# Patient Record
Sex: FEMALE | Race: WHITE | NOT HISPANIC OR LATINO | Employment: OTHER | URBAN - NONMETROPOLITAN AREA
[De-identification: names, ages, dates, MRNs, and addresses within clinical notes are randomized per-mention and may not be internally consistent; named-entity substitution may affect disease eponyms.]

---

## 2020-08-10 ENCOUNTER — OFFICE VISIT (OUTPATIENT)
Dept: INTERNAL MEDICINE | Facility: CLINIC | Age: 65
End: 2020-08-10

## 2020-08-10 ENCOUNTER — RESULTS ENCOUNTER (OUTPATIENT)
Dept: INTERNAL MEDICINE | Facility: CLINIC | Age: 65
End: 2020-08-10

## 2020-08-10 VITALS
DIASTOLIC BLOOD PRESSURE: 70 MMHG | HEIGHT: 68 IN | WEIGHT: 120 LBS | OXYGEN SATURATION: 98 % | SYSTOLIC BLOOD PRESSURE: 120 MMHG | HEART RATE: 77 BPM | TEMPERATURE: 97.3 F | BODY MASS INDEX: 18.19 KG/M2

## 2020-08-10 DIAGNOSIS — M81.0 OSTEOPOROSIS, UNSPECIFIED OSTEOPOROSIS TYPE, UNSPECIFIED PATHOLOGICAL FRACTURE PRESENCE: ICD-10-CM

## 2020-08-10 DIAGNOSIS — I10 ESSENTIAL HYPERTENSION: ICD-10-CM

## 2020-08-10 DIAGNOSIS — F41.9 ANXIETY: ICD-10-CM

## 2020-08-10 DIAGNOSIS — Z79.899 LONG TERM CURRENT USE OF THERAPEUTIC DRUG: ICD-10-CM

## 2020-08-10 DIAGNOSIS — Z76.89 ENCOUNTER TO ESTABLISH CARE: Primary | ICD-10-CM

## 2020-08-10 DIAGNOSIS — M45.9 ANKYLOSING SPONDYLITIS, UNSPECIFIED SITE OF SPINE (HCC): ICD-10-CM

## 2020-08-10 DIAGNOSIS — Z11.59 NEED FOR HEPATITIS C SCREENING TEST: ICD-10-CM

## 2020-08-10 DIAGNOSIS — I45.10 RIGHT BUNDLE BRANCH BLOCK: ICD-10-CM

## 2020-08-10 DIAGNOSIS — I44.4 LEFT ANTERIOR FASCICULAR BLOCK: ICD-10-CM

## 2020-08-10 PROCEDURE — 99203 OFFICE O/P NEW LOW 30 MIN: CPT | Performed by: NURSE PRACTITIONER

## 2020-08-10 PROCEDURE — 93000 ELECTROCARDIOGRAM COMPLETE: CPT | Performed by: NURSE PRACTITIONER

## 2020-08-10 RX ORDER — HYDROCHLOROTHIAZIDE 25 MG/1
25 TABLET ORAL DAILY
COMMUNITY
End: 2021-07-02 | Stop reason: SDUPTHER

## 2020-08-10 RX ORDER — ALENDRONATE SODIUM 70 MG/1
70 TABLET ORAL
COMMUNITY
End: 2020-11-16 | Stop reason: SDUPTHER

## 2020-08-10 RX ORDER — LISINOPRIL 20 MG/1
20 TABLET ORAL DAILY
COMMUNITY
End: 2021-07-02 | Stop reason: SDUPTHER

## 2020-08-10 RX ORDER — ETODOLAC 400 MG/1
400 TABLET, FILM COATED ORAL 2 TIMES DAILY
COMMUNITY
End: 2020-10-30 | Stop reason: SDUPTHER

## 2020-08-10 RX ORDER — PREDNISOLONE ACETATE 10 MG/ML
1 SUSPENSION/ DROPS OPHTHALMIC AS NEEDED
COMMUNITY
End: 2023-02-23

## 2020-08-10 RX ORDER — ALPRAZOLAM 0.5 MG/1
0.5 TABLET ORAL DAILY
COMMUNITY
End: 2020-09-30 | Stop reason: SDUPTHER

## 2020-08-10 NOTE — PROGRESS NOTES
Subjective   Edel Ashton is a 65 y.o. female.   Chief Complaint   Patient presents with   • Establish Care     reports last EKG showed 2 blockages and forgot to bring it       Michelle presents today to establish care with primary care provider.  She recently moved here from [facility] about 3 weeks ago.  She reports a history of anxiety and depression, resolved skin cancer, hypertension, and ankylosing spondylitis.  Anxiety is well managed as she continues on Xanax 0.5 mg once daily.  She states she has attempted being without this medication and is unable to tolerate side effects.  She reports she feels she is on fire when not taking it.  She does report that she used to take Xanax 1 mg daily but has decreased to 0.5 mg and this is working well for her.    She does monitor her blood pressures at home.  She reports she usually does this about once a week and gets readings around 140 systolic but cannot recall what her diastolic typically runs.  She is 120/70 in the office today.  She denies any symptoms of chest pain shortness of breath or palpitations.  She does report back in May or June of this year she began to feel some left arm tingling that let her go go to the emergency room for evaluation.  An EKG at that time showed 2 different blockages per her report and she was referred back to her PCP.  She states otherwise there was no positive findings in the emergency room.  She denies having any further episodes similar to the one that took her to the emergency room.    She reports her last Pap was in December 2019.  She states close to 30 years ago she had an abnormal Pap that resulted in colposcopy.  She states since then she has had normal Pap smears.  She tends to get these every 3 years.  She reports her last mammogram was around December 2019 and was normal.  She reports her last colonoscopy was in November 2019 and she is to repeat in 3 years due to findings.  She reports they found 7 polyps and  were unable to remove a few of them.  She states she also believes she had her bone density scan in fall 2019.  She reports a history of osteoporosis that she continues Fosamax for.       The following portions of the patient's history were reviewed and updated as appropriate: allergies, current medications, past family history, past medical history, past social history, past surgical history and problem list.    Review of Systems   Constitutional: Negative for activity change, appetite change, fatigue, fever, unexpected weight gain and unexpected weight loss.   HENT: Negative for swollen glands, trouble swallowing and voice change.    Eyes: Negative for blurred vision and visual disturbance.   Respiratory: Negative for cough and shortness of breath.    Cardiovascular: Negative for chest pain, palpitations and leg swelling.   Gastrointestinal: Negative for abdominal pain, constipation, diarrhea, nausea, vomiting and indigestion.   Endocrine: Negative for cold intolerance, heat intolerance, polydipsia and polyphagia.   Genitourinary: Negative for dysuria and frequency.   Musculoskeletal: Positive for arthralgias. Negative for back pain, joint swelling and neck pain.   Skin: Negative for color change, rash and skin lesions.   Neurological: Negative for dizziness, weakness, headache, memory problem and confusion.   Hematological: Does not bruise/bleed easily.   Psychiatric/Behavioral: Negative for agitation, hallucinations and suicidal ideas. The patient is nervous/anxious.        Objective   Past Medical History:   Diagnosis Date   • Anxiety    • Cancer (CMS/HCC)     skin   • Depression    • Hypertension       Past Surgical History:   Procedure Laterality Date   • CHOLECYSTECTOMY  1978   • SKIN CANCER EXCISION  2019        Current Outpatient Medications:   •  alendronate (FOSAMAX) 70 MG tablet, Take 70 mg by mouth Every 7 (Seven) Days., Disp: , Rfl:   •  ALPRAZolam (XANAX) 0.5 MG tablet, Take 0.5 mg by mouth Daily.,  Disp: , Rfl:   •  etodolac (LODINE) 400 MG tablet, Take 400 mg by mouth 2 (Two) Times a Day., Disp: , Rfl:   •  hydroCHLOROthiazide (HYDRODIURIL) 25 MG tablet, Take 25 mg by mouth Daily., Disp: , Rfl:   •  lisinopril (PRINIVIL,ZESTRIL) 20 MG tablet, Take 20 mg by mouth Daily., Disp: , Rfl:   •  prednisoLONE acetate (PRED FORTE) 1 % ophthalmic suspension, 1 drop As Needed., Disp: , Rfl:      Vitals:    08/10/20 1313   BP: 120/70   Pulse: 77   Temp: 97.3 °F (36.3 °C)   SpO2: 98%         08/10/20  1313   Weight: 54.4 kg (120 lb)     Patient's Body mass index is 18.25 kg/m². BMI is within normal parameters. No follow-up required..      Physical Exam   Constitutional: She is oriented to person, place, and time. She appears well-developed and well-nourished.   HENT:   Head: Normocephalic and atraumatic.   Right Ear: Tympanic membrane and external ear normal.   Left Ear: Tympanic membrane and external ear normal.   Nose: Nose normal.   Mouth/Throat: Oropharynx is clear and moist. No oral lesions.   Eyes: Pupils are equal, round, and reactive to light. Conjunctivae and EOM are normal.   Neck: Normal range of motion. Neck supple. No JVD present. Carotid bruit is not present. No thyromegaly present.   Cardiovascular: Normal rate, regular rhythm and intact distal pulses.   Pulses:       Radial pulses are 2+ on the right side, and 2+ on the left side.   No bilateral lower extremity edema   Pulmonary/Chest: Effort normal and breath sounds normal.   Abdominal: Soft. Bowel sounds are normal. There is no tenderness.   Lymphadenopathy:     She has no cervical adenopathy.   Neurological: She is alert and oriented to person, place, and time. Coordination and gait normal.   Skin: Skin is warm and dry.   Psychiatric: She has a normal mood and affect. Her speech is normal and behavior is normal. Thought content normal.   Nursing note and vitals reviewed.      ECG 12 Lead  Date/Time: 8/10/2020 4:48 PM  Performed by: Silvia Servin  LUAN  Authorized by: Silvia Servin APRN   Rhythm: sinus rhythm  Conduction: right bundle branch block and left anterior fascicular block  Other findings comments: Possible Left Atrial Enlargement; Possible Left Ventricular Hypertrophy    Clinical impression: abnormal EKG  Comments: Reviewed by Dr. Tarun Wahl            Assessment/Plan   Diagnoses and all orders for this visit:    1. Encounter to establish care (Primary)    2. Essential hypertension  -     Comprehensive Metabolic Panel; Future  -     CBC & Differential; Future  -     Lipid Panel; Future  -     Uric Acid; Future  -     Urinalysis With Microscopic - Urine, Clean Catch; Future    3. Osteoporosis, unspecified osteoporosis type, unspecified pathological fracture presence    4. Anxiety  -     TSH; Future  -     046912 [id number] Drug-[facility] -; Future    5. Ankylosing spondylitis, unspecified site of spine (CMS/HCC)    6. Need for hepatitis C screening test  -     Hepatitis C Antibody; Future    7. Right bundle branch block  -     Ambulatory Referral to Cardiology  -     ECG 12 Lead    8. Left anterior fascicular block  -     Ambulatory Referral to Cardiology    9. Long term current use of therapeutic drug  -     362178 [id number] Drug-Unbund -; Future        An EKG was repeated in office today showing a right bundle branch block as well as a left anterior fascicular block.  I have discussed this with Dr. Wahl who recommends evaluation by cardiology.  Patient agrees to this referral and this is been placed in the chart.  Blood pressure appears to be well controlled.  She did report she runs a little higher at home than what we got in the office today.  I have asked that at her next follow-up she bring her cuff with her from home to verify its accuracy.  She is not fasting today therefore labs have been entered and I have asked her to return sometime this week fasting to have them completed.  We will complete her annual in 3 months.  I have had her sign a controlled  substance contract, have placed an order for urine drug screen, and reviewed her [facility] report.  She is not due for refills at this time.

## 2020-08-13 LAB
ALBUMIN SERPL-MCNC: 4.4 G/DL (ref 3.5–5.2)
ALBUMIN/GLOB SERPL: 2.8 G/DL
ALP SERPL-CCNC: 56 U/L (ref 39–117)
ALT SERPL-CCNC: 16 U/L (ref 1–33)
APPEARANCE UR: ABNORMAL
AST SERPL-CCNC: 18 U/L (ref 1–32)
BACTERIA #/AREA URNS HPF: ABNORMAL /HPF
BASOPHILS # BLD AUTO: 0.05 10*3/MM3 (ref 0–0.2)
BASOPHILS NFR BLD AUTO: 0.9 % (ref 0–1.5)
BILIRUB SERPL-MCNC: 0.6 MG/DL (ref 0–1.2)
BILIRUB UR QL STRIP: NEGATIVE
BUN SERPL-MCNC: 20 MG/DL (ref 8–23)
BUN/CREAT SERPL: 21.5 (ref 7–25)
CALCIUM SERPL-MCNC: 8.9 MG/DL (ref 8.6–10.5)
CASTS URNS MICRO: ABNORMAL
CHLORIDE SERPL-SCNC: 105 MMOL/L (ref 98–107)
CHOLEST SERPL-MCNC: 200 MG/DL (ref 0–200)
CO2 SERPL-SCNC: 27.2 MMOL/L (ref 22–29)
COLOR UR: ABNORMAL
CREAT SERPL-MCNC: 0.93 MG/DL (ref 0.57–1)
EOSINOPHIL # BLD AUTO: 0.28 10*3/MM3 (ref 0–0.4)
EOSINOPHIL NFR BLD AUTO: 4.9 % (ref 0.3–6.2)
EPI CELLS #/AREA URNS HPF: ABNORMAL /HPF
ERYTHROCYTE [DISTWIDTH] IN BLOOD BY AUTOMATED COUNT: 11.5 % (ref 12.3–15.4)
GLOBULIN SER CALC-MCNC: 1.6 GM/DL
GLUCOSE SERPL-MCNC: 97 MG/DL (ref 65–99)
GLUCOSE UR QL: NEGATIVE
HCT VFR BLD AUTO: 41.9 % (ref 34–46.6)
HCV AB S/CO SERPL IA: 0.1 S/CO RATIO (ref 0–0.9)
HDLC SERPL-MCNC: 115 MG/DL (ref 40–60)
HGB BLD-MCNC: 14.5 G/DL (ref 12–15.9)
HGB UR QL STRIP: NEGATIVE
IMM GRANULOCYTES # BLD AUTO: 0.01 10*3/MM3 (ref 0–0.05)
IMM GRANULOCYTES NFR BLD AUTO: 0.2 % (ref 0–0.5)
KETONES UR QL STRIP: NEGATIVE
LDLC SERPL CALC-MCNC: 76 MG/DL (ref 0–100)
LEUKOCYTE ESTERASE UR QL STRIP: ABNORMAL
LYMPHOCYTES # BLD AUTO: 1.51 10*3/MM3 (ref 0.7–3.1)
LYMPHOCYTES NFR BLD AUTO: 26.5 % (ref 19.6–45.3)
MCH RBC QN AUTO: 32.4 PG (ref 26.6–33)
MCHC RBC AUTO-ENTMCNC: 34.6 G/DL (ref 31.5–35.7)
MCV RBC AUTO: 93.7 FL (ref 79–97)
MONOCYTES # BLD AUTO: 0.45 10*3/MM3 (ref 0.1–0.9)
MONOCYTES NFR BLD AUTO: 7.9 % (ref 5–12)
NEUTROPHILS # BLD AUTO: 3.4 10*3/MM3 (ref 1.7–7)
NEUTROPHILS NFR BLD AUTO: 59.6 % (ref 42.7–76)
NITRITE UR QL STRIP: NEGATIVE
NRBC BLD AUTO-RTO: 0 /100 WBC (ref 0–0.2)
PH UR STRIP: 5.5 [PH] (ref 5–8)
PLATELET # BLD AUTO: 268 10*3/MM3 (ref 140–450)
POTASSIUM SERPL-SCNC: 4.5 MMOL/L (ref 3.5–5.2)
PROT SERPL-MCNC: 6 G/DL (ref 6–8.5)
PROT UR QL STRIP: ABNORMAL
RBC # BLD AUTO: 4.47 10*6/MM3 (ref 3.77–5.28)
RBC #/AREA URNS HPF: ABNORMAL /HPF
SODIUM SERPL-SCNC: 143 MMOL/L (ref 136–145)
SP GR UR: 1.02 (ref 1–1.03)
TRIGL SERPL-MCNC: 43 MG/DL (ref 0–150)
TSH SERPL DL<=0.005 MIU/L-ACNC: 0.86 UIU/ML (ref 0.27–4.2)
URATE SERPL-MCNC: 2.2 MG/DL (ref 2.4–5.7)
UROBILINOGEN UR STRIP-MCNC: ABNORMAL MG/DL
VLDLC SERPL CALC-MCNC: 8.6 MG/DL
WBC # BLD AUTO: 5.7 10*3/MM3 (ref 3.4–10.8)
WBC #/AREA URNS HPF: ABNORMAL /HPF

## 2020-08-15 ENCOUNTER — RESULTS ENCOUNTER (OUTPATIENT)
Dept: INTERNAL MEDICINE | Facility: CLINIC | Age: 65
End: 2020-08-15

## 2020-08-15 DIAGNOSIS — Z11.59 NEED FOR HEPATITIS C SCREENING TEST: ICD-10-CM

## 2020-08-15 DIAGNOSIS — F41.9 ANXIETY: ICD-10-CM

## 2020-08-15 DIAGNOSIS — I10 ESSENTIAL HYPERTENSION: ICD-10-CM

## 2020-09-02 ENCOUNTER — OFFICE VISIT (OUTPATIENT)
Dept: CARDIOLOGY | Facility: CLINIC | Age: 65
End: 2020-09-02

## 2020-09-02 VITALS
DIASTOLIC BLOOD PRESSURE: 62 MMHG | HEART RATE: 74 BPM | BODY MASS INDEX: 18.04 KG/M2 | SYSTOLIC BLOOD PRESSURE: 120 MMHG | HEIGHT: 68 IN | OXYGEN SATURATION: 98 % | WEIGHT: 119 LBS

## 2020-09-02 DIAGNOSIS — I10 ESSENTIAL HYPERTENSION: Primary | ICD-10-CM

## 2020-09-02 DIAGNOSIS — F17.200 SMOKER: ICD-10-CM

## 2020-09-02 DIAGNOSIS — Z82.49 FAMILY HISTORY OF EARLY CAD: ICD-10-CM

## 2020-09-02 DIAGNOSIS — G89.29 CHRONIC MIDLINE LOW BACK PAIN WITHOUT SCIATICA: ICD-10-CM

## 2020-09-02 DIAGNOSIS — R94.31 ABNORMAL ECG: ICD-10-CM

## 2020-09-02 DIAGNOSIS — I44.4 LAFB (LEFT ANTERIOR FASCICULAR BLOCK): ICD-10-CM

## 2020-09-02 DIAGNOSIS — F41.9 ANXIETY AND DEPRESSION: ICD-10-CM

## 2020-09-02 DIAGNOSIS — R06.09 DOE (DYSPNEA ON EXERTION): ICD-10-CM

## 2020-09-02 DIAGNOSIS — I45.10 RBBB: ICD-10-CM

## 2020-09-02 DIAGNOSIS — M54.50 CHRONIC MIDLINE LOW BACK PAIN WITHOUT SCIATICA: ICD-10-CM

## 2020-09-02 DIAGNOSIS — F32.A ANXIETY AND DEPRESSION: ICD-10-CM

## 2020-09-02 PROBLEM — I25.10 CORONARY ARTERY DISEASE INVOLVING NATIVE CORONARY ARTERY: Status: ACTIVE | Noted: 2020-09-02

## 2020-09-02 PROBLEM — I25.10 CORONARY ARTERY DISEASE INVOLVING NATIVE CORONARY ARTERY: Status: RESOLVED | Noted: 2020-09-02 | Resolved: 2020-09-02

## 2020-09-02 PROCEDURE — 99204 OFFICE O/P NEW MOD 45 MIN: CPT | Performed by: INTERNAL MEDICINE

## 2020-09-02 NOTE — PROGRESS NOTES
Edel Ashton  [id number]  1955  65 y.o.  female    Referring Provider: Silvia Servin APRN    Reason for  Visit:  Initial visit for  shortness of breath   Referred for abnormal ECG right  bundle branch block   left anterior fascicular block      Subjective    Mild chronic exertional shortness of breath on exertion relieved with rest  No significant cough or wheezing    No palpitations  No associated chest pain  No significant pedal edema    No fever or chills  No significant expectoration    No hemoptysis  No presyncope or syncope    Tolerating current medications well with no untoward side effects   Compliant with prescribed medication regimen. Tries to adhere to cardiac diet.   Smoker   Strong family history of early coronary artery disease    Joint pain in small, medium and large joints   Chronic low back pain          History of present illness:  Edel Ashton is a 65 y.o. yo female with history of essential Hypertension    who presents today for   Chief Complaint   Patient presents with   • RBBB     NEW PT    .    History  Past Medical History:   Diagnosis Date   • Anxiety    • Cancer (CMS/HCC)     skin   • Depression    • Hypertension    ,   Past Surgical History:   Procedure Laterality Date   • CHOLECYSTECTOMY  1978   • SKIN CANCER EXCISION  2019   ,   Family History   Problem Relation Age of Onset   • Arthritis Mother    • Hypertension Mother    • Depression Mother    • Cancer Maternal Grandmother    • Arthritis Paternal Grandmother    • Cancer Paternal Grandmother    • Cancer Other    ,   Social History     Tobacco Use   • Smoking status: Current Some Day Smoker     Years: 40.00     Types: Cigarettes   • Smokeless tobacco: Never Used   • Tobacco comment: occasional   Substance Use Topics   • Alcohol use: Yes     Alcohol/week: 1.0 - 2.0 standard drinks     Types: 1 - 2 Cans of beer per week     Comment: per patient a night   • Drug use: Never   ,     Medications  Current Outpatient Medications  "  Medication Sig Dispense Refill   • alendronate (FOSAMAX) 70 MG tablet Take 70 mg by mouth Every 7 (Seven) Days.     • ALPRAZolam (XANAX) 0.5 MG tablet Take 0.5 mg by mouth Daily.     • etodolac (LODINE) 400 MG tablet Take 400 mg by mouth 2 (Two) Times a Day.     • hydroCHLOROthiazide (HYDRODIURIL) 25 MG tablet Take 25 mg by mouth Daily.     • lisinopril (PRINIVIL,ZESTRIL) 20 MG tablet Take 20 mg by mouth Daily.     • prednisoLONE acetate (PRED FORTE) 1 % ophthalmic suspension 1 drop As Needed.       No current facility-administered medications for this visit.        Allergies:  Patient has no known allergies.    Review of Systems  Review of Systems   Constitution: Negative.   HENT: Negative.    Eyes: Negative.    Cardiovascular: Positive for dyspnea on exertion. Negative for chest pain, claudication, cyanosis, irregular heartbeat, leg swelling, near-syncope, orthopnea, palpitations, paroxysmal nocturnal dyspnea and syncope.   Respiratory: Negative.    Endocrine: Negative.    Hematologic/Lymphatic: Negative.    Skin: Negative.    Gastrointestinal: Negative for anorexia.   Genitourinary: Negative.    Neurological: Negative.    Psychiatric/Behavioral: Negative.        Objective     Physical Exam:  /62   Pulse 74   Ht 172.7 cm (67.99\")   Wt 54 kg (119 lb)   SpO2 98%   BMI 18.10 kg/m²     Physical Exam   Constitutional: She appears well-nourished.   HENT:   Head: Normocephalic.   Eyes: Lids are normal.   Neck: Carotid bruit is not present.   Cardiovascular: Regular rhythm, S1 normal, S2 normal and normal heart sounds.      No systolic murmur is present.  Pulmonary/Chest: Effort normal.   Abdominal: Soft. Normal appearance.   Neurological: She is alert.   Psychiatric: She has a normal mood and affect. Her speech is normal and behavior is normal. Thought content normal. Cognition and memory are normal.       Results Review:     Procedures    Assessment/Plan   Edel was seen today for rbbb.    Diagnoses and " all orders for this visit:    Essential hypertension    Abnormal ECG  -     Adult Transthoracic Echo Complete W/ Cont if Necessary Per Protocol; Future    HYATT (dyspnea on exertion)  -     Stress Test With Myocardial Perfusion One Day; Future    RBBB    LAFB (left anterior fascicular block)    Anxiety and depression    Smoker    Chronic midline low back pain without sciatica    Family history of early CAD      ____________________________________________________________________________________________________________________________________________  Health maintenance and recommendations      Low salt/ HTN/ Heart healthy carbohydrate restricted cardiac diet   The patient is advised to reduce or avoid caffeine or other cardiac stimulants.   Minimize or avoid  NSAID-type medications      Monitor for any signs of bleeding including red or dark stools. Fall precautions.   Advised staying uptodate with immunizations per established standard guidelines  Offered to give patient  a copy of my notes     Questions were encouraged, asked and answered to the patient's  understanding and satisfaction. Questions if any regarding current medications and side effects, need for refills and importance of compliance to medications stressed.    Reviewed available prior notes, consults, prior visits, laboratory findings, radiology and cardiology relevant reports. Updated chart as applicable. I have reviewed the patient's medical history in detail and updated the computerized patient record as relevant.      Updated patient regarding any new or relevant abnormalities on review of records or any new findings on physical exam. Mentioned to patient about purpose of visit and desirable health short and long term goals and objectives.    Primary to monitor CBC CMP Lipid panel and TSH as  applicable    ___________________________________________________________________________________________________________________________________________      Plan      Orders Placed This Encounter   Procedures   • Stress Test With Myocardial Perfusion One Day     Standing Status:   Future     Standing Expiration Date:   9/2/2021     Order Specific Question:   What stress agent will be used?     Answer:   Regadenoson (Lexiscan)     Order Specific Question:   Difficulty walking criteria?     Answer:   LBBB     Order Specific Question:   Reason for exam?     Answer:   Other Reasons (uncertain in most circumstances)     Order Specific Question:   Other Reasons (uncertain in most circumstances)?     Answer:   High Suspicion of Cardiac Disease   • Adult Transthoracic Echo Complete W/ Cont if Necessary Per Protocol     Standing Status:   Future     Standing Expiration Date:   9/2/2021     Order Specific Question:   Reason for exam?     Answer:   Dyspnea              Return in about 6 weeks (around 10/14/2020).

## 2020-09-25 ENCOUNTER — HOSPITAL ENCOUNTER (OUTPATIENT)
Dept: CARDIOLOGY | Facility: HOSPITAL | Age: 65
Discharge: HOME | End: 2020-09-25

## 2020-09-25 VITALS
WEIGHT: 119.05 LBS | BODY MASS INDEX: 18.04 KG/M2 | HEART RATE: 67 BPM | SYSTOLIC BLOOD PRESSURE: 116 MMHG | HEIGHT: 68 IN | DIASTOLIC BLOOD PRESSURE: 71 MMHG

## 2020-09-25 VITALS
SYSTOLIC BLOOD PRESSURE: 120 MMHG | WEIGHT: 119 LBS | HEIGHT: 68 IN | DIASTOLIC BLOOD PRESSURE: 62 MMHG | BODY MASS INDEX: 18.04 KG/M2

## 2020-09-25 DIAGNOSIS — R94.31 ABNORMAL ECG: ICD-10-CM

## 2020-09-25 DIAGNOSIS — R06.09 DOE (DYSPNEA ON EXERTION): ICD-10-CM

## 2020-09-25 PROCEDURE — 93306 TTE W/DOPPLER COMPLETE: CPT

## 2020-09-25 PROCEDURE — 93306 TTE W/DOPPLER COMPLETE: CPT | Performed by: INTERNAL MEDICINE

## 2020-09-25 PROCEDURE — 78452 HT MUSCLE IMAGE SPECT MULT: CPT

## 2020-09-25 PROCEDURE — 78452 HT MUSCLE IMAGE SPECT MULT: CPT | Performed by: INTERNAL MEDICINE

## 2020-09-25 PROCEDURE — 25010000002 REGADENOSON 0.4 MG/5ML SOLUTION: Performed by: INTERNAL MEDICINE

## 2020-09-25 PROCEDURE — 93018 CV STRESS TEST I&R ONLY: CPT | Performed by: INTERNAL MEDICINE

## 2020-09-25 PROCEDURE — 25010000002 PERFLUTREN 6.52 MG/ML SUSPENSION: Performed by: INTERNAL MEDICINE

## 2020-09-25 PROCEDURE — 93017 CV STRESS TEST TRACING ONLY: CPT

## 2020-09-25 PROCEDURE — A9500 TC99M SESTAMIBI: HCPCS | Performed by: INTERNAL MEDICINE

## 2020-09-25 PROCEDURE — 0 TECHNETIUM SESTAMIBI: Performed by: INTERNAL MEDICINE

## 2020-09-25 RX ADMIN — REGADENOSON 0.4 MG: 0.08 INJECTION, SOLUTION INTRAVENOUS at 09:37

## 2020-09-25 RX ADMIN — PERFLUTREN: 6.52 INJECTION, SUSPENSION INTRAVENOUS at 07:58

## 2020-09-25 RX ADMIN — TECHNETIUM TC 99M SESTAMIBI 1 DOSE: 1 INJECTION INTRAVENOUS at 08:23

## 2020-09-25 RX ADMIN — TECHNETIUM TC 99M SESTAMIBI 1 DOSE: 1 INJECTION INTRAVENOUS at 09:40

## 2020-09-26 LAB
BH CV STRESS BP STAGE 1: NORMAL
BH CV STRESS COMMENTS STAGE 1: NORMAL
BH CV STRESS DOSE REGADENOSON STAGE 1: 0.4
BH CV STRESS DURATION MIN STAGE 1: 0
BH CV STRESS DURATION SEC STAGE 1: 10
BH CV STRESS HR STAGE 1: 82
BH CV STRESS PROTOCOL 1: NORMAL
BH CV STRESS RECOVERY BP: NORMAL MMHG
BH CV STRESS RECOVERY HR: 73 BPM
BH CV STRESS STAGE 1: 1
LV EF NUC BP: 65 %
MAXIMAL PREDICTED HEART RATE: 154 BPM
PERCENT MAX PREDICTED HR: 53.25 %
STRESS BASELINE BP: NORMAL MMHG
STRESS BASELINE HR: 67 BPM
STRESS PERCENT HR: 63 %
STRESS POST EXERCISE DUR SEC: 10 SEC
STRESS POST PEAK BP: NORMAL MMHG
STRESS POST PEAK HR: 82 BPM
STRESS TARGET HR: 131 BPM

## 2020-09-27 LAB
BH CV ECHO MEAS - AO MAX PG (FULL): 1.2 MMHG
BH CV ECHO MEAS - AO MAX PG: 5.3 MMHG
BH CV ECHO MEAS - AO MEAN PG (FULL): 1 MMHG
BH CV ECHO MEAS - AO MEAN PG: 3 MMHG
BH CV ECHO MEAS - AO ROOT AREA (BSA CORRECTED): 1.8
BH CV ECHO MEAS - AO ROOT AREA: 6.6 CM^2
BH CV ECHO MEAS - AO ROOT DIAM: 2.9 CM
BH CV ECHO MEAS - AO V2 MAX: 115 CM/SEC
BH CV ECHO MEAS - AO V2 MEAN: 76 CM/SEC
BH CV ECHO MEAS - AO V2 VTI: 26 CM
BH CV ECHO MEAS - AVA(I,A): 2.5 CM^2
BH CV ECHO MEAS - AVA(I,D): 2.5 CM^2
BH CV ECHO MEAS - AVA(V,A): 2.5 CM^2
BH CV ECHO MEAS - AVA(V,D): 2.5 CM^2
BH CV ECHO MEAS - BSA(HAYCOCK): 1.6 M^2
BH CV ECHO MEAS - BSA: 1.6 M^2
BH CV ECHO MEAS - BZI_BMI: 18.1 KILOGRAMS/M^2
BH CV ECHO MEAS - BZI_METRIC_HEIGHT: 172.7 CM
BH CV ECHO MEAS - BZI_METRIC_WEIGHT: 54 KG
BH CV ECHO MEAS - EDV(CUBED): 33.1 ML
BH CV ECHO MEAS - EDV(MOD-SP4): 68.5 ML
BH CV ECHO MEAS - EDV(TEICH): 41.3 ML
BH CV ECHO MEAS - EF(CUBED): 63.2 %
BH CV ECHO MEAS - EF(MOD-SP4): 58 %
BH CV ECHO MEAS - EF(TEICH): 56.1 %
BH CV ECHO MEAS - ESV(CUBED): 12.2 ML
BH CV ECHO MEAS - ESV(MOD-SP4): 28.8 ML
BH CV ECHO MEAS - ESV(TEICH): 18.1 ML
BH CV ECHO MEAS - FS: 28.3 %
BH CV ECHO MEAS - IVS/LVPW: 1.3
BH CV ECHO MEAS - IVSD: 0.86 CM
BH CV ECHO MEAS - LA DIMENSION: 2.2 CM
BH CV ECHO MEAS - LA/AO: 0.76
BH CV ECHO MEAS - LAT PEAK E' VEL: 8.4 CM/SEC
BH CV ECHO MEAS - LV DIASTOLIC VOL/BSA (35-75): 41.8 ML/M^2
BH CV ECHO MEAS - LV MASS(C)D: 60.2 GRAMS
BH CV ECHO MEAS - LV MASS(C)DI: 36.7 GRAMS/M^2
BH CV ECHO MEAS - LV MAX PG: 4.1 MMHG
BH CV ECHO MEAS - LV MEAN PG: 2 MMHG
BH CV ECHO MEAS - LV SYSTOLIC VOL/BSA (12-30): 17.6 ML/M^2
BH CV ECHO MEAS - LV V1 MAX: 101 CM/SEC
BH CV ECHO MEAS - LV V1 MEAN: 70.5 CM/SEC
BH CV ECHO MEAS - LV V1 VTI: 22.8 CM
BH CV ECHO MEAS - LVIDD: 3.2 CM
BH CV ECHO MEAS - LVIDS: 2.3 CM
BH CV ECHO MEAS - LVLD AP4: 7.4 CM
BH CV ECHO MEAS - LVLS AP4: 6.2 CM
BH CV ECHO MEAS - LVOT AREA (M): 2.8 CM^2
BH CV ECHO MEAS - LVOT AREA: 2.8 CM^2
BH CV ECHO MEAS - LVOT DIAM: 1.9 CM
BH CV ECHO MEAS - LVPWD: 0.65 CM
BH CV ECHO MEAS - MED PEAK E' VEL: 7.83 CM/SEC
BH CV ECHO MEAS - MV A MAX VEL: 65.1 CM/SEC
BH CV ECHO MEAS - MV DEC TIME: 0.36 SEC
BH CV ECHO MEAS - MV E MAX VEL: 65.1 CM/SEC
BH CV ECHO MEAS - MV E/A: 1
BH CV ECHO MEAS - RAP SYSTOLE: 5 MMHG
BH CV ECHO MEAS - RVSP: 8.8 MMHG
BH CV ECHO MEAS - SI(AO): 104.8 ML/M^2
BH CV ECHO MEAS - SI(CUBED): 12.8 ML/M^2
BH CV ECHO MEAS - SI(LVOT): 39.4 ML/M^2
BH CV ECHO MEAS - SI(MOD-SP4): 24.2 ML/M^2
BH CV ECHO MEAS - SI(TEICH): 14.1 ML/M^2
BH CV ECHO MEAS - SV(AO): 171.7 ML
BH CV ECHO MEAS - SV(CUBED): 20.9 ML
BH CV ECHO MEAS - SV(LVOT): 64.6 ML
BH CV ECHO MEAS - SV(MOD-SP4): 39.7 ML
BH CV ECHO MEAS - SV(TEICH): 23.2 ML
BH CV ECHO MEAS - TR MAX VEL: 97.7 CM/SEC
BH CV ECHO MEASUREMENTS AVERAGE E/E' RATIO: 8.02
LEFT ATRIUM VOLUME INDEX: 9.6 ML/M2
LEFT ATRIUM VOLUME: 15.8 CM3
MAXIMAL PREDICTED HEART RATE: 154 BPM
STRESS TARGET HR: 131 BPM

## 2020-09-30 DIAGNOSIS — F41.9 ANXIETY AND DEPRESSION: Primary | ICD-10-CM

## 2020-09-30 DIAGNOSIS — F32.A ANXIETY AND DEPRESSION: Primary | ICD-10-CM

## 2020-10-02 ENCOUNTER — TELEPHONE (OUTPATIENT)
Dept: INTERNAL MEDICINE | Facility: CLINIC | Age: 65
End: 2020-10-02

## 2020-10-02 RX ORDER — ALPRAZOLAM 0.5 MG/1
0.5 TABLET ORAL DAILY
Qty: 30 TABLET | Refills: 0 | Status: SHIPPED | OUTPATIENT
Start: 2020-10-02 | End: 2020-10-14 | Stop reason: ALTCHOICE

## 2020-10-02 NOTE — TELEPHONE ENCOUNTER
Spoke to patient and informed that Xanax was sent to pharmacy based on what was in the system. Patient informed that this will need to be looked into further and will notify of any changes.

## 2020-10-02 NOTE — TELEPHONE ENCOUNTER
ALPRAZolam (XANAX) 0.5 MG tablet    This was suppose to be 24 hour release not .5 mg    Please change    [id number] - [place], [place] - [address] AT [address]OSS [address] - [phone] [facility] [phone] FX

## 2020-10-02 NOTE — TELEPHONE ENCOUNTER
Babak Yarbrough at [facility] pharmacy patient picked up prescription 10/2/2020  for Xanax 0.5 mg. I was informed by pharmacy staff that patient received Xanax ER on 7/2020  #90, sig 0.5 mg every morning. Which was rx'd by Dr. Benji Senior.

## 2020-10-05 ENCOUNTER — TELEPHONE (OUTPATIENT)
Dept: INTERNAL MEDICINE | Facility: CLINIC | Age: 65
End: 2020-10-05

## 2020-10-05 NOTE — TELEPHONE ENCOUNTER
If she picked up the medicine it will need to last the prescribed amount of time unless Dr. Wahl says otherwise.  I'm am going to need some records to verify this before we refill the other.

## 2020-10-05 NOTE — TELEPHONE ENCOUNTER
PATIENT CALLED STATING SHE WENT TO  HER ALPRAZolam (XANAX) 0.5 MG tablet AND IT IS NOT IN EXTENDED RELEASE FORM LIKE IT SHOULD BE.      PLEASE CALL BACK AND ADVISE  [phone]

## 2020-10-05 NOTE — TELEPHONE ENCOUNTER
Pt called today:  PATIENT CALLED STATING SHE WENT   TO  HER ALPRAZolam (XANAX) 0.5 MG tablet AND IT IS NOT IN EXTENDED RELEASE FORM LIKE IT SHOULD BE.       PLEASE CALL BACK AND ADVISE  [phone]         Documentation      Called to speak with pt she states what we sent in was wrong bc we put the incorrect medication in the computer the first time we saw her.  She states that she did  the script on 10/02/2020 and has been taking them but has to take 2 a day bc they do not last 24 hours bc they are not XR.    I wasn't sure what we could or could not do about this.

## 2020-10-06 ENCOUNTER — TELEPHONE (OUTPATIENT)
Dept: INTERNAL MEDICINE | Facility: CLINIC | Age: 65
End: 2020-10-06

## 2020-10-06 ENCOUNTER — DOCUMENTATION (OUTPATIENT)
Dept: INTERNAL MEDICINE | Facility: CLINIC | Age: 65
End: 2020-10-06

## 2020-10-06 LAB
AMPHETAMINES UR QL SCN: NEGATIVE NG/ML
BARBITURATES UR QL SCN: NEGATIVE NG/ML
BENZODIAZ UR QL: NEGATIVE
BZE UR QL: NEGATIVE NG/ML
CANNABINOIDS UR QL SCN: NEGATIVE NG/ML
OPIATES UR QL: NEGATIVE NG/ML
PCP UR QL: NEGATIVE NG/ML

## 2020-10-06 RX ORDER — ASCORBIC ACID 500 MG
500 TABLET ORAL DAILY
COMMUNITY

## 2020-10-06 RX ORDER — ALPRAZOLAM 0.5 MG/1
0.5 TABLET, EXTENDED RELEASE ORAL EVERY MORNING
COMMUNITY
End: 2020-10-27 | Stop reason: SDUPTHER

## 2020-10-06 RX ORDER — CALCIUM CARBONATE/VITAMIN D3 600 MG-10
1 TABLET ORAL DAILY
COMMUNITY

## 2020-10-06 RX ORDER — ZINC GLUCONATE 50 MG
1 TABLET ORAL DAILY
COMMUNITY

## 2020-10-06 NOTE — TELEPHONE ENCOUNTER
PATIENT CALLED WANTING TO KNOW IF A FAX OF HER PRESCRIPTIONS CAME IN YET FROM HER DOCTOR UP  [place].    PLEASE CALL AND ADVISE  [phone]

## 2020-10-13 NOTE — PROGRESS NOTES
Subjective:     Encounter Date:10/14/2020      Patient ID: Edel Wilson is a 65 y.o. female   HPI: This patient presents today for routine follow-up of outpatient testing.  2D echo on 9/25/2020 revealed normal left ventricular systolic function with ejection fraction of 61 to 65%, normal left ventricular diastolic function, no significant valvular heart disease and no pulmonary hypertension.  Lexiscan on 9/25/2020 revealed no evidence of ischemia consistent with a low risk study.  She has a history of hypertension, left anterior fascicular block, right bundle branch block, anxiety, depression, skin cancer and tobacco use. She reports some shortness of breath in the mornings when first getting out of bed. Patient denies chest pain, palpitations, dizziness, syncope, orthopnea, PND, edema or decreased stamina.  Patient denies any signs of bleeding.    Chief Complaint: Routine follow-up  Hypertension  This is a chronic problem. The current episode started more than 1 year ago. The problem is controlled. Associated symptoms include shortness of breath. Pertinent negatives include no anxiety, blurred vision, chest pain, headaches, malaise/fatigue, neck pain, orthopnea, palpitations, peripheral edema, PND or sweats. Risk factors for coronary artery disease include post-menopausal state. Current antihypertension treatment includes ACE inhibitors and diuretics. The current treatment provides significant improvement.       Previous Cardiac Testing:  Results for orders placed during the hospital encounter of 09/25/20   Adult Transthoracic Echo Complete W/ Cont if Necessary Per Protocol    Narrative · Left ventricular ejection fraction appears to be 61 - 65%. Left   ventricular systolic function is normal.  · Left ventricular diastolic function was normal  · Estimated right ventricular systolic pressure from tricuspid   regurgitation is normal (<35 mmHg).            The following portions of the patient's history were  reviewed and updated as appropriate: allergies, current medications, past family history, past medical history, past social history, past surgical history and problem list.    No Known Allergies    Current Outpatient Medications:   •  alendronate (FOSAMAX) 70 MG tablet, Take 70 mg by mouth Every 7 (Seven) Days., Disp: , Rfl:   •  ALPRAZolam XR (XANAX XR) 0.5 MG 24 hr tablet, Take 0.5 mg by mouth Every Morning., Disp: , Rfl:   •  Calcium Carbonate (CALCIUM 600 PO), Take 1 tablet by mouth Daily., Disp: , Rfl:   •  Cholecalciferol (vitamin D3) 125 MCG (5000 UT) capsule capsule, Take 5,000 Units by mouth Daily., Disp: , Rfl:   •  etodolac (LODINE) 400 MG tablet, Take 400 mg by mouth 2 (Two) Times a Day., Disp: , Rfl:   •  hydroCHLOROthiazide (HYDRODIURIL) 25 MG tablet, Take 25 mg by mouth Daily., Disp: , Rfl:   •  lisinopril (PRINIVIL,ZESTRIL) 20 MG tablet, Take 20 mg by mouth Daily., Disp: , Rfl:   •  Multiple Vitamins-Minerals (MULTIVITAMIN ADULT PO), Take  by mouth., Disp: , Rfl:   •  Omega 3 1200 MG capsule, Take 1 tablet by mouth Daily., Disp: , Rfl:   •  prednisoLONE acetate (PRED FORTE) 1 % ophthalmic suspension, 1 drop As Needed., Disp: , Rfl:   •  vitamin C (ASCORBIC ACID) 500 MG tablet, Take 500 mg by mouth Daily., Disp: , Rfl:   •  VITAMIN E PO, Take  by mouth., Disp: , Rfl:   •  Zinc 50 MG tablet, Take  by mouth., Disp: , Rfl:   Past Medical History:   Diagnosis Date   • Anxiety    • Cancer (CMS/HCC)     skin   • Depression    • Hypertension      Past Surgical History:   Procedure Laterality Date   • CHOLECYSTECTOMY  1978   • SKIN CANCER EXCISION  2019     Family History   Problem Relation Age of Onset   • Arthritis Mother    • Hypertension Mother    • Depression Mother    • Cancer Maternal Grandmother    • Arthritis Paternal Grandmother    • Cancer Paternal Grandmother    • Cancer Other      Social History     Socioeconomic History   • Marital status:      Spouse name: Not on file   • Number of  children: Not on file   • Years of education: Not on file   • Highest education level: Not on file   Tobacco Use   • Smoking status: Current Some Day Smoker     Packs/day: 0.25     Years: 40.00     Pack years: 10.00     Types: Cigarettes   • Smokeless tobacco: Never Used   • Tobacco comment: occasional   Substance and Sexual Activity   • Alcohol use: Yes     Alcohol/week: 1.0 - 2.0 standard drinks     Types: 1 - 2 Cans of beer per week     Comment: per patient a night   • Drug use: Never   • Sexual activity: Defer       Review of Systems   Constitution: Negative for chills, decreased appetite, fever, malaise/fatigue, night sweats, weight gain and weight loss.   HENT: Negative for nosebleeds.    Eyes: Negative for blurred vision and visual disturbance.   Cardiovascular: Negative for chest pain, dyspnea on exertion, leg swelling, near-syncope, orthopnea, palpitations, paroxysmal nocturnal dyspnea and syncope.   Respiratory: Positive for shortness of breath. Negative for cough, hemoptysis, snoring and wheezing.    Endocrine: Negative for cold intolerance and heat intolerance.   Hematologic/Lymphatic: Does not bruise/bleed easily.   Skin: Negative for rash.   Musculoskeletal: Negative for back pain, falls and neck pain.   Gastrointestinal: Negative for abdominal pain, change in bowel habit, constipation, diarrhea, dysphagia, heartburn, nausea and vomiting.   Genitourinary: Negative for hematuria.   Neurological: Negative for dizziness, headaches, light-headedness and weakness.   Psychiatric/Behavioral: Negative for altered mental status.   Allergic/Immunologic: Negative for persistent infections.              Objective:     Vitals signs and nursing note reviewed.   Constitutional:       General: Not in acute distress.     Appearance: Normal and healthy appearance. Well-developed, normal weight and not in distress. Not diaphoretic.   Eyes:      General: Lids are normal.         Right eye: No discharge.         Left eye:  No discharge.      Conjunctiva/sclera: Conjunctivae normal.      Pupils: Pupils are equal, round, and reactive to light.   HENT:      Head: Normocephalic and atraumatic.      Jaw: There is normal jaw occlusion.      Right Ear: External ear normal.      Left Ear: External ear normal.      Nose: Nose normal.   Neck:      Musculoskeletal: Normal range of motion and neck supple.      Thyroid: No thyromegaly.      Vascular: No carotid bruit, JVD or JVR. JVD normal.      Trachea: Trachea normal. No tracheal deviation.   Pulmonary:      Effort: Pulmonary effort is normal. No respiratory distress.      Breath sounds: Examination of the right-upper field reveals decreased breath sounds. Examination of the left-upper field reveals decreased breath sounds. Examination of the right-middle field reveals decreased breath sounds. Examination of the left-middle field reveals decreased breath sounds. Examination of the right-lower field reveals decreased breath sounds. Examination of the left-lower field reveals decreased breath sounds. Decreased breath sounds present. No wheezing. No rhonchi. No rales.   Chest:      Chest wall: Not tender to palpatation.   Cardiovascular:      PMI at left midclavicular line. Normal rate. Regular rhythm. Normal S1. Normal S2.      Murmurs: There is no murmur.      No gallop. No click. No rub.   Pulses:     Intact distal pulses. No decreased pulses.   Edema:     Peripheral edema absent.   Abdominal:      General: Bowel sounds are normal. There is no distension.      Palpations: Abdomen is soft.      Tenderness: There is no abdominal tenderness.   Musculoskeletal: Normal range of motion.         General: No tenderness or deformity.   Skin:     General: Skin is warm and dry.      Coloration: Skin is not pale.      Findings: No erythema or rash.   Neurological:      General: No focal deficit present.      Mental Status: Alert, oriented to person, place, and time and oriented to person, place and time.  "  Psychiatric:         Attention and Perception: Attention and perception normal.         Mood and Affect: Mood and affect normal.         Speech: Speech normal.         Behavior: Behavior normal.         Thought Content: Thought content normal.         Cognition and Memory: Cognition and memory normal.         Judgment: Judgment normal.           Procedures  /58   Pulse 74   Ht 172.7 cm (68\")   Wt 54.9 kg (121 lb)   SpO2 100%   BMI 18.40 kg/m²   Lab Review:   Lab Results   Component Value Date    WBC 5.70 08/12/2020    HGB 14.5 08/12/2020    HCT 41.9 08/12/2020    MCV 93.7 08/12/2020     08/12/2020     Lab Results   Component Value Date    BUN 20 08/12/2020    CREATININE 0.93 08/12/2020    EGFRIFNONA 60 (L) 08/12/2020    EGFRIFAFRI 73 08/12/2020    BCR 21.5 08/12/2020    K 4.5 08/12/2020    CO2 27.2 08/12/2020    CALCIUM 8.9 08/12/2020    PROTENTOTREF 6.0 08/12/2020    ALBUMIN 4.40 08/12/2020    LABIL2 2.8 08/12/2020    AST 18 08/12/2020    ALT 16 08/12/2020     Lab Results   Component Value Date    CHLPL 200 08/12/2020     Lab Results   Component Value Date    TRIG 43 08/12/2020     Lab Results   Component Value Date     (H) 08/12/2020     Lab Results   Component Value Date    LDL 76 08/12/2020       I have reviewed the most recent lab results.       Assessment:          Diagnosis Plan   1. Essential hypertension  Well controlled.    2. LAFB (left anterior fascicular block)     3. RBBB     4. Anxiety and depression     5. Smoker  Edel Wilson  reports that she has been smoking cigarettes. She has a 10.00 pack-year smoking history. She has never used smokeless tobacco.. I have educated her on the risk of diseases from using tobacco products such as cancer, COPD and heart disease.     I advised her to quit and she is not willing to quit.    I spent 3  minutes counseling the patient.                 Plan:         1. Continue medications as previously prescribed.  2. Report any worsening " symptoms.  3. Report any signs of bleeding.  4. Continue heart healthy diet and regular exercise as tolerated.   5. Follow up with PCP for blood pressure and cholesterol management and routine lab work.  6. Follow up with Dr. Lino in six months, or sooner if needed.       Advance Care Planning   ACP discussion was held with the patient during this visit. Patient does not have an advance directive, information provided.

## 2020-10-13 NOTE — PATIENT INSTRUCTIONS
Health Risks of Smoking  Smoking cigarettes is very bad for your health. Tobacco smoke has over 200 known poisons in it. It contains the poisonous gases nitrogen oxide and carbon monoxide. There are over 60 chemicals in tobacco smoke that cause cancer.  Smoking is difficult to quit because a chemical in tobacco, called nicotine, causes addiction or dependence. When you smoke and inhale, nicotine is absorbed rapidly into the bloodstream through your lungs. Both inhaled and non-inhaled nicotine may be addictive.  What are the risks of cigarette smoke?  Cigarette smokers have an increased risk of many serious medical problems, including:  · Lung cancer.  · Lung disease, such as pneumonia, bronchitis, and emphysema.  · Chest pain (angina) and heart attack because the heart is not getting enough oxygen.  · Heart disease and peripheral blood vessel disease.  · High blood pressure (hypertension).  · Stroke.  · Oral cancer, including cancer of the lip, mouth, or voice box.  · Bladder cancer.  · Pancreatic cancer.  · Cervical cancer.  · Pregnancy complications, including premature birth.  · Stillbirths and smaller  babies, birth defects, and genetic damage to sperm.  · Early menopause.  · Lower estrogen level for women.  · Infertility.  · Facial wrinkles.  · Blindness.  · Increased risk of broken bones (fractures).  · Senile dementia.  · Stomach ulcers and internal bleeding.  · Delayed wound healing and increased risk of complications during surgery.  · Even smoking lightly shortens your life expectancy by several years.  Because of secondhand smoke exposure, children of smokers have an increased risk of the following:  · Sudden infant death syndrome (SIDS).  · Respiratory infections.  · Lung cancer.  · Heart disease.  · Ear infections.  What are the benefits of quitting?  There are many health benefits of quitting smoking. Here are some of them:  · Within days of quitting smoking, your risk of having a heart attack  decreases, your blood flow improves, and your lung capacity improves. Blood pressure, pulse rate, and breathing patterns start returning to normal soon after quitting.  · Within months, your lungs may clear up completely.  · Quitting for 10 years reduces your risk of developing lung cancer and heart disease to almost that of a nonsmoker.  · People who quit may see an improvement in their overall quality of life.  How do I quit smoking?         Smoking is an addiction with both physical and psychological effects, and longtime habits can be hard to change. Your health care provider can recommend:  · Programs and community resources, which may include group support, education, or talk therapy.  · Prescription medicines to help reduce cravings.  · Nicotine replacement products, such as patches, gum, and nasal sprays. Use these products only as directed. Do not replace cigarette smoking with electronic cigarettes, which are commonly called e-cigarettes. The safety of e-cigarettes is not known, and some may contain harmful chemicals.  · A combination of two or more of these methods.  Where to find more information  · American Lung Association: www.lung.org  · American Cancer Society: www.cancer.org  Summary  · Smoking cigarettes is very bad for your health. Cigarette smokers have an increased risk of many serious medical problems, including several cancers, heart disease, and stroke.  · Smoking is an addiction with both physical and psychological effects, and longtime habits can be hard to change.  · By stopping right away, you can greatly reduce the risk of medical problems for you and your family.  · To help you quit smoking, your health care provider can recommend programs, community resources, prescription medicines, and nicotine replacement products such as patches, gum, and nasal sprays.  This information is not intended to replace advice given to you by your health care provider. Make sure you discuss any questions  you have with your health care provider.  Document Released: 01/25/2006 Document Revised: 03/21/2019 Document Reviewed: 12/22/2017  [employer] Patient Education © 2020 [employer]    Steps to Quit Smoking  Smoking tobacco is the leading cause of preventable death. It can affect almost every organ in the body. Smoking puts you and people around you at risk for many serious, long-lasting (chronic) diseases. Quitting smoking can be hard, but it is one of the best things that you can do for your health. It is never too late to quit.  How do I get ready to quit?  When you decide to quit smoking, make a plan to help you succeed. Before you quit:  · Pick a date to quit. Set a date within the next 2 weeks to give you time to prepare.  · Write down the reasons why you are quitting. Keep this list in places where you will see it often.  · Tell your family, friends, and co-workers that you are quitting. Their support is important.  · Talk with your doctor about the choices that may help you quit.  · Find out if your health insurance will pay for these treatments.  · Know the people, places, things, and activities that make you want to smoke (triggers). Avoid them.  What first steps can I take to quit smoking?  · Throw away all cigarettes at home, at work, and in your car.  · Throw away the things that you use when you smoke, such as ashtrays and lighters.  · Clean your car. Make sure to empty the ashtray.  · Clean your home, including curtains and carpets.  What can I do to help me quit smoking?  Talk with your doctor about taking medicines and seeing a counselor at the same time. You are more likely to succeed when you do both.  · If you are pregnant or breastfeeding, talk with your doctor about counseling or other ways to quit smoking. Do not take medicine to help you quit smoking unless your doctor tells you to do so.  To quit smoking:  Quit right away  · Quit smoking totally, instead of slowly cutting back on how much you  smoke over a period of time.  · Go to counseling. You are more likely to quit if you go to counseling sessions regularly.  Take medicine  You may take medicines to help you quit. Some medicines need a prescription, and some you can buy over-the-counter. Some medicines may contain a drug called nicotine to replace the nicotine in cigarettes. Medicines may:  · Help you to stop having the desire to smoke (cravings).  · Help to stop the problems that come when you stop smoking (withdrawal symptoms).  Your doctor may ask you to use:  · Nicotine patches, gum, or lozenges.  · Nicotine inhalers or sprays.  · Non-nicotine medicine that is taken by mouth.  Find resources  Find resources and other ways to help you quit smoking and remain smoke-free after you quit. These resources are most helpful when you use them often. They include:  · Online chats with a counselor.  · Phone quitlines.  · Printed self-help materials.  · Support groups or group counseling.  · Text messaging programs.  · Mobile phone apps. Use apps on your mobile phone or tablet that can help you stick to your quit plan. There are many free apps for mobile phones and tablets as well as websites. Examples include Quit Guide from the CDC and smokefree.gov    What things can I do to make it easier to quit?    · Talk to your family and friends. Ask them to support and encourage you.  · Call a phone quitline (5-800-QUIT-NOW), reach out to support groups, or work with a counselor.  · Ask people who smoke to not smoke around you.  · Avoid places that make you want to smoke, such as:  ? Bars.  ? Parties.  ? Smoke-break areas at work.  · Spend time with people who do not smoke.  · Lower the stress in your life. Stress can make you want to smoke. Try these things to help your stress:  ? Getting regular exercise.  ? Doing deep-breathing exercises.  ? Doing yoga.  ? Meditating.  ? Doing a body scan. To do this, close your eyes, focus on one area of your body at a time  from head to toe. Notice which parts of your body are tense. Try to relax the muscles in those areas.  How will I feel when I quit smoking?  Day 1 to 3 weeks  Within the first 24 hours, you may start to have some problems that come from quitting tobacco. These problems are very bad 2-3 days after you quit, but they do not often last for more than 2-3 weeks. You may get these symptoms:  · Mood swings.  · Feeling restless, nervous, angry, or annoyed.  · Trouble concentrating.  · Dizziness.  · Strong desire for high-sugar foods and nicotine.  · Weight gain.  · Trouble pooping (constipation).  · Feeling like you may vomit (nausea).  · Coughing or a sore throat.  · Changes in how the medicines that you take for other issues work in your body.  · Depression.  · Trouble sleeping (insomnia).  Week 3 and afterward  After the first 2-3 weeks of quitting, you may start to notice more positive results, such as:  · Better sense of smell and taste.  · Less coughing and sore throat.  · Slower heart rate.  · Lower blood pressure.  · Clearer skin.  · Better breathing.  · Fewer sick days.  Quitting smoking can be hard. Do not give up if you fail the first time. Some people need to try a few times before they succeed. Do your best to stick to your quit plan, and talk with your doctor if you have any questions or concerns.  Summary  · Smoking tobacco is the leading cause of preventable death. Quitting smoking can be hard, but it is one of the best things that you can do for your health.  · When you decide to quit smoking, make a plan to help you succeed.  · Quit smoking right away, not slowly over a period of time.  · When you start quitting, seek help from your doctor, family, or friends.  This information is not intended to replace advice given to you by your health care provider. Make sure you discuss any questions you have with your health care provider.  Document Released: 10/14/2010 Document Revised: 09/11/2020 Document Reviewed:  03/07/2020  [employer] Patient Education © 2020 [employer]

## 2020-10-14 ENCOUNTER — OFFICE VISIT (OUTPATIENT)
Dept: CARDIOLOGY | Facility: CLINIC | Age: 65
End: 2020-10-14

## 2020-10-14 VITALS
WEIGHT: 121 LBS | HEIGHT: 68 IN | DIASTOLIC BLOOD PRESSURE: 58 MMHG | HEART RATE: 74 BPM | OXYGEN SATURATION: 100 % | SYSTOLIC BLOOD PRESSURE: 116 MMHG | BODY MASS INDEX: 18.34 KG/M2

## 2020-10-14 DIAGNOSIS — I44.4 LAFB (LEFT ANTERIOR FASCICULAR BLOCK): ICD-10-CM

## 2020-10-14 DIAGNOSIS — I10 ESSENTIAL HYPERTENSION: Primary | ICD-10-CM

## 2020-10-14 DIAGNOSIS — F41.9 ANXIETY AND DEPRESSION: ICD-10-CM

## 2020-10-14 DIAGNOSIS — F32.A ANXIETY AND DEPRESSION: ICD-10-CM

## 2020-10-14 DIAGNOSIS — F17.200 SMOKER: ICD-10-CM

## 2020-10-14 DIAGNOSIS — I45.10 RBBB: ICD-10-CM

## 2020-10-14 PROCEDURE — 99213 OFFICE O/P EST LOW 20 MIN: CPT | Performed by: NURSE PRACTITIONER

## 2020-10-27 DIAGNOSIS — F41.9 ANXIETY: Primary | ICD-10-CM

## 2020-10-27 RX ORDER — ALPRAZOLAM 0.5 MG/1
0.5 TABLET, EXTENDED RELEASE ORAL EVERY MORNING
Qty: 30 TABLET | Refills: 0 | Status: SHIPPED | OUTPATIENT
Start: 2020-10-27 | End: 2020-11-23 | Stop reason: SDUPTHER

## 2020-10-27 NOTE — TELEPHONE ENCOUNTER
Caller: Edel Wilson    Relationship: Self    Best call back number: [phone]     Medication needed:   Requested Prescriptions     Pending Prescriptions Disp Refills   • ALPRAZolam XR (XANAX XR) 0.5 MG 24 hr tablet        Sig: Take 1 tablet by mouth Every Morning.       When do you need the refill by: 10/27/20    What details did the patient provide when requesting the medication: THE PATIENT STATED THAT LAST TIME SHE RECEIVED THE WRONG MEDICATION. SHE DIDN'T RECEIVE THE EXTENDED RELEASE AND WANTS TO MAKE SURE SHE GETS THAT RIGHT ONE THIS TIME. SHE IS CURRENTLY OUT.    Does the patient have less than a 3 day supply:  [x] Yes  [] No    What is the patient's preferred pharmacy: [id number] - [place], [place] - [address] AT [address] FROM [address]  [phone] [facility] [phone] FX

## 2020-10-28 NOTE — TELEPHONE ENCOUNTER
PATIENT CALLED STATING HER PHARMACY HAD GIVEN HER THE WRONG PRESCRIPTION - PHARMACY HAD GIVEN HER ONLY ONE A DAY OF etodolac (LODINE) 400 MG tablet AND SHE NEEDS TWO PER DAY    PHARMACY NEEDS CORRECTED PRESCRIPTION BEFORE THEY CAN FILL THIS REQUEST    [id number] - [place], [place] - [address] AT [address]OSS FROM [address] - [phone] [facility] [phone]   [phone]

## 2020-10-30 RX ORDER — ETODOLAC 400 MG/1
400 TABLET, FILM COATED ORAL 2 TIMES DAILY
Qty: 60 TABLET | Refills: 5 | Status: SHIPPED | OUTPATIENT
Start: 2020-10-30 | End: 2021-04-19

## 2020-10-30 NOTE — TELEPHONE ENCOUNTER
Pt called back and stated her old provider did a refill on the etodolac (lodine) 400 mg.  He did not do the correct dosage/ script.    She stated she is taking 400 mg, 2 daily.   She is asking if a new script can be sent over to [facility] at [facility].     Called [facility] and they stated pt has not picked up the medication and is disputing the amount she takes.     Thanks

## 2020-11-16 RX ORDER — ALENDRONATE SODIUM 70 MG/1
70 TABLET ORAL
Qty: 4 TABLET | Refills: 11 | Status: SHIPPED | OUTPATIENT
Start: 2020-11-16 | End: 2021-08-23

## 2020-11-16 NOTE — TELEPHONE ENCOUNTER
Caller: Edel Wilson    Relationship: Self    Best call back number: [phone]    Medication needed:   Requested Prescriptions     Pending Prescriptions Disp Refills   • alendronate (FOSAMAX) 70 MG tablet        Sig: Take 1 tablet by mouth Every 7 (Seven) Days.       Does the patient have less than a 3 day supply:  [x] Yes  [] No    What is the patient's preferred pharmacy: [id number] - [place], [place] - [address] AT [address] FROM [address]  [phone] [facility] [phone]

## 2020-11-23 ENCOUNTER — OFFICE VISIT (OUTPATIENT)
Dept: INTERNAL MEDICINE | Facility: CLINIC | Age: 65
End: 2020-11-23

## 2020-11-23 VITALS
WEIGHT: 120 LBS | HEART RATE: 97 BPM | TEMPERATURE: 98.4 F | OXYGEN SATURATION: 98 % | SYSTOLIC BLOOD PRESSURE: 108 MMHG | DIASTOLIC BLOOD PRESSURE: 68 MMHG | BODY MASS INDEX: 18.19 KG/M2 | HEIGHT: 68 IN

## 2020-11-23 DIAGNOSIS — Z23 NEED FOR SHINGLES VACCINE: ICD-10-CM

## 2020-11-23 DIAGNOSIS — Z23 NEED FOR PNEUMOCOCCAL VACCINE: ICD-10-CM

## 2020-11-23 DIAGNOSIS — Z00.00 MEDICARE ANNUAL WELLNESS VISIT, SUBSEQUENT: ICD-10-CM

## 2020-11-23 DIAGNOSIS — F41.9 ANXIETY: ICD-10-CM

## 2020-11-23 DIAGNOSIS — I10 ESSENTIAL HYPERTENSION: Primary | ICD-10-CM

## 2020-11-23 PROCEDURE — 90732 PPSV23 VACC 2 YRS+ SUBQ/IM: CPT | Performed by: NURSE PRACTITIONER

## 2020-11-23 PROCEDURE — G0009 ADMIN PNEUMOCOCCAL VACCINE: HCPCS | Performed by: NURSE PRACTITIONER

## 2020-11-23 PROCEDURE — 99213 OFFICE O/P EST LOW 20 MIN: CPT | Performed by: NURSE PRACTITIONER

## 2020-11-23 PROCEDURE — G0402 INITIAL PREVENTIVE EXAM: HCPCS | Performed by: NURSE PRACTITIONER

## 2020-11-23 RX ORDER — ALPRAZOLAM 0.5 MG/1
0.5 TABLET, EXTENDED RELEASE ORAL EVERY MORNING
Qty: 30 TABLET | Refills: 0 | Status: SHIPPED | OUTPATIENT
Start: 2020-11-26 | End: 2020-12-22 | Stop reason: SDUPTHER

## 2020-11-23 NOTE — PROGRESS NOTES
"Subjective   Denisea VICKIE Wilson is a 65 y.o. female.   Chief Complaint   Patient presents with   • Anxiety     Doing ok on the Xanax.       Ms. Ashton is a pleasant 65-year-old female who presents to the office today for follow-up on anxiety.  At the last follow-up patient had establish care and had reported moderate to severe anxiety symptoms which have been controlled for many years with Xanax.  She had previously been on 1 mg Xanax twice a day and has reduced herself down to 0.5 mg once daily.  She reports significant control of her anxiety symptoms with this medication.  She is tried to wean off of it and trial antidepressants without benefit.  She is also tried psychologist and psychiatrist without improvement.  [place] was reviewed and appropriate for use.    Her blood pressure today is controlled with current therapies and she denies chest pain, shortness of breath, palpitations, or lower extremity edema.  She is taking her medication as prescribed and denies skipping or missing doses.    She denies other complaints today and reports \"doing very well overall \".    Anxiety  Presents for follow-up visit. Symptoms include excessive worry, insomnia, irritability, nervous/anxious behavior, panic and restlessness. Patient reports no chest pain, confusion, dizziness, nausea, palpitations, shortness of breath or suicidal ideas. Symptoms occur most days. The severity of symptoms is severe (controlled with xanax once a day). The quality of sleep is fair. Nighttime awakenings: occasional.     Compliance with medications is %.   Osteoarthritis  She reports no joint swelling. Pertinent negatives include no diarrhea, dysuria, fatigue, fever, rash or weight loss. Her past medical history is significant for osteoarthritis.   Hypertension  This is a chronic problem. The current episode started more than 1 year ago. The problem has been waxing and waning since onset. The problem is controlled. Associated symptoms include " anxiety. Pertinent negatives include no blurred vision, chest pain, headaches, malaise/fatigue, neck pain, orthopnea, palpitations, peripheral edema or shortness of breath. Agents associated with hypertension include NSAIDs. Risk factors for coronary artery disease include post-menopausal state and smoking/tobacco exposure. Current antihypertension treatment includes ACE inhibitors, diuretics and lifestyle changes. The current treatment provides significant improvement. There is no history of kidney disease, CAD/MI, heart failure or retinopathy. Identifiable causes of hypertension include a hypertension causing med.        The following portions of the patient's history were reviewed and updated as appropriate: allergies, current medications, past family history, past medical history, past social history, past surgical history and problem list.    Review of Systems   Constitutional: Positive for irritability. Negative for activity change, appetite change, fatigue, fever, malaise/fatigue, unexpected weight gain and unexpected weight loss.   HENT: Negative for congestion, drooling, ear discharge, ear pain, postnasal drip, rhinorrhea, swollen glands, trouble swallowing and voice change.    Eyes: Negative for blurred vision, pain, redness and visual disturbance.   Respiratory: Negative for cough and shortness of breath.    Cardiovascular: Negative for chest pain, palpitations, orthopnea and leg swelling.   Gastrointestinal: Negative for abdominal pain, constipation, diarrhea, nausea, vomiting and indigestion.   Endocrine: Negative for cold intolerance, heat intolerance, polydipsia and polyphagia.   Genitourinary: Negative for dysuria, frequency, hematuria, menstrual problem and pelvic pain.   Musculoskeletal: Negative for arthralgias, back pain, joint swelling and neck pain.   Skin: Negative for color change, rash and skin lesions.   Allergic/Immunologic: Negative for environmental allergies.   Neurological: Negative  for dizziness, weakness, headache, memory problem and confusion.   Hematological: Does not bruise/bleed easily.   Psychiatric/Behavioral: Negative for agitation, hallucinations and suicidal ideas. The patient is nervous/anxious and has insomnia.        Objective   Past Medical History:   Diagnosis Date   • Anxiety    • Cancer (CMS/HCC)     skin   • Depression    • Hypertension       Past Surgical History:   Procedure Laterality Date   • CHOLECYSTECTOMY  1978   • SKIN CANCER EXCISION  2019        Current Outpatient Medications:   •  alendronate (FOSAMAX) 70 MG tablet, Take 1 tablet by mouth Every 7 (Seven) Days., Disp: 4 tablet, Rfl: 11  •  [START ON 11/26/2020] ALPRAZolam XR (XANAX XR) 0.5 MG 24 hr tablet, Take 1 tablet by mouth Every Morning., Disp: 30 tablet, Rfl: 0  •  Calcium Carbonate (CALCIUM 600 PO), Take 1 tablet by mouth Daily., Disp: , Rfl:   •  Cholecalciferol (vitamin D3) 125 MCG (5000 UT) capsule capsule, Take 5,000 Units by mouth Daily., Disp: , Rfl:   •  etodolac (LODINE) 400 MG tablet, Take 1 tablet by mouth 2 (two) times a day., Disp: 60 tablet, Rfl: 5  •  hydroCHLOROthiazide (HYDRODIURIL) 25 MG tablet, Take 25 mg by mouth Daily., Disp: , Rfl:   •  lisinopril (PRINIVIL,ZESTRIL) 20 MG tablet, Take 20 mg by mouth Daily., Disp: , Rfl:   •  Multiple Vitamins-Minerals (MULTIVITAMIN ADULT PO), Take  by mouth., Disp: , Rfl:   •  Omega 3 1200 MG capsule, Take 1 tablet by mouth Daily., Disp: , Rfl:   •  vitamin C (ASCORBIC ACID) 500 MG tablet, Take 500 mg by mouth Daily., Disp: , Rfl:   •  VITAMIN E PO, Take  by mouth., Disp: , Rfl:   •  Zinc 50 MG tablet, Take  by mouth., Disp: , Rfl:   •  prednisoLONE acetate (PRED FORTE) 1 % ophthalmic suspension, 1 drop As Needed., Disp: , Rfl:      Vitals:    11/23/20 1340   BP: 108/68   Pulse: 97   Temp: 98.4 °F (36.9 °C)   SpO2: 98%         11/23/20  1340   Weight: 54.4 kg (120 lb)     Patient's Body mass index is 18.25 kg/m². BMI is within normal parameters. No  follow-up required..      Physical Exam  Vitals signs and nursing note reviewed.   Constitutional:       Appearance: Normal appearance. She is well-developed and normal weight.   HENT:      Head: Normocephalic and atraumatic.      Right Ear: Hearing, tympanic membrane, ear canal and external ear normal.      Left Ear: Hearing, tympanic membrane, ear canal and external ear normal.      Nose: Nose normal.      Mouth/Throat:      Lips: Pink.      Mouth: Mucous membranes are moist.      Pharynx: Oropharynx is clear. Uvula midline.   Eyes:      General: Lids are normal. Lids are everted, no foreign bodies appreciated. Vision grossly intact.      Conjunctiva/sclera: Conjunctivae normal.      Pupils: Pupils are equal, round, and reactive to light.   Neck:      Musculoskeletal: Normal range of motion and neck supple.      Thyroid: No thyromegaly.   Cardiovascular:      Rate and Rhythm: Normal rate and regular rhythm.      Pulses: Normal pulses.      Heart sounds: Normal heart sounds.   Pulmonary:      Effort: Pulmonary effort is normal.      Breath sounds: Normal breath sounds.   Abdominal:      General: Abdomen is flat. Bowel sounds are normal.      Palpations: Abdomen is soft.      Tenderness: There is no abdominal tenderness.   Musculoskeletal:      Right lower leg: No edema.      Left lower leg: No edema.   Lymphadenopathy:      Cervical: No cervical adenopathy.   Skin:     General: Skin is warm and dry.      Capillary Refill: Capillary refill takes less than 2 seconds.   Neurological:      General: No focal deficit present.      Mental Status: She is alert and oriented to person, place, and time.      Deep Tendon Reflexes: Reflexes are normal and symmetric.   Psychiatric:         Attention and Perception: Attention normal.         Mood and Affect: Mood normal.         Speech: Speech normal.         Behavior: Behavior normal. Behavior is cooperative.         Thought Content: Thought content normal.         Cognition and  Memory: Cognition and memory normal.         Judgment: Judgment normal.           Assessment/Plan   Diagnoses and all orders for this visit:    1. Essential hypertension (Primary)    2. Anxiety  -     ALPRAZolam XR (XANAX XR) 0.5 MG 24 hr tablet; Take 1 tablet by mouth Every Morning.  Dispense: 30 tablet; Refill: 0    3. Need for shingles vaccine  -     Zoster Vac Recomb Adjuvanted 50 MCG/0.5ML reconstituted suspension; Inject 0.5 mL into the appropriate muscle as directed by prescriber 1 (One) Time for 1 dose.  Dispense: 1 each; Refill: 0    4. Need for pneumococcal vaccine  -     Pneumococcal Polysaccharide Vaccine 23-Valent Greater Than or Equal To 1yo Subcutaneous / IM      Will complete shingles and pneumonia. Patient will call the office if shingles vaccination is too expensive out of pocket cost. Will continue her on xanax once daily related to appropriate use and has tapered from original dose. Had offered her counselor/psychiatrist referral for cocurrent therapy, she declines at the time related to COVID concerns. She will call the office if she changes her mind.

## 2020-11-24 NOTE — PROGRESS NOTES
The ABCs of the Annual Wellness Visit  Subsequent Medicare Wellness Visit    Chief Complaint   Patient presents with   • Anxiety     Doing ok on the Xanax.       Subjective   History of Present Illness:  Edel Wilson is a 65 y.o. female who presents for a Subsequent Medicare Wellness Visit. Does not want to complete mammogram yearly. Last year mammogram was benignand she does preform self-breast exam monthly and denies abnormalities. She had colonoscopy completed last year and will need follow up in 3 years. Patient had dexa bone scan completed last year and is currently taking fosamax for treatment. She is agreeable to pneumonia vaccination in the office today. Will send order for shingrix to pharmacy. Denies complaints today outside of chronic anxiety.     HEALTH RISK ASSESSMENT    Recent Hospitalizations:  No hospitalization(s) within the last year.    Current Medical Providers:  Patient Care Team:  Silvia Servin APRN as PCP - General (Family Medicine)  Giovanni Lino MD as Cardiologist (Cardiology)  Silvia Servin APRN as Referring Physician (Family Medicine)    Smoking Status:  Social History     Tobacco Use   Smoking Status Current Some Day Smoker   • Packs/day: 0.25   • Years: 40.00   • Pack years: 10.00   • Types: Cigarettes   Smokeless Tobacco Never Used   Tobacco Comment    occasional       Alcohol Consumption:  Social History     Substance and Sexual Activity   Alcohol Use Yes   • Alcohol/week: 1.0 - 2.0 standard drinks   • Types: 1 - 2 Cans of beer per week    Comment: per patient a night       Depression Screen:   PHQ-2/PHQ-9 Depression Screening 8/10/2020   Little interest or pleasure in doing things 0   Feeling down, depressed, or hopeless 0   Total Score 0       Fall Risk Screen:  STEADI Fall Risk Assessment was completed, and patient is at MODERATE risk for falls. Assessment completed on:8/10/2020    Health Habits and Functional and Cognitive Screening:  No flowsheet data found.      Does the  patient have evidence of cognitive impairment? No    Asprin use counseling:Does not need ASA (and currently is not on it)    Age-appropriate Screening Schedule:  Refer to the list below for future screening recommendations based on patient's age, sex and/or medical conditions. Orders for these recommended tests are listed in the plan section. The patient has been provided with a written plan.    Health Maintenance   Topic Date Due   • ZOSTER VACCINE (1 of 2) 07/03/2004   • TDAP/TD VACCINES (1 - Tdap) 11/29/2021 (Originally 7/3/1974)   • DXA SCAN  11/01/2021   • MAMMOGRAM  12/01/2021   • COLONOSCOPY  11/01/2029   • INFLUENZA VACCINE  Completed          The following portions of the patient's history were reviewed and updated as appropriate: allergies, current medications, past family history, past medical history, past social history, past surgical history and problem list.    Outpatient Medications Prior to Visit   Medication Sig Dispense Refill   • alendronate (FOSAMAX) 70 MG tablet Take 1 tablet by mouth Every 7 (Seven) Days. 4 tablet 11   • Calcium Carbonate (CALCIUM 600 PO) Take 1 tablet by mouth Daily.     • Cholecalciferol (vitamin D3) 125 MCG (5000 UT) capsule capsule Take 5,000 Units by mouth Daily.     • etodolac (LODINE) 400 MG tablet Take 1 tablet by mouth 2 (two) times a day. 60 tablet 5   • hydroCHLOROthiazide (HYDRODIURIL) 25 MG tablet Take 25 mg by mouth Daily.     • lisinopril (PRINIVIL,ZESTRIL) 20 MG tablet Take 20 mg by mouth Daily.     • Multiple Vitamins-Minerals (MULTIVITAMIN ADULT PO) Take  by mouth.     • Omega 3 1200 MG capsule Take 1 tablet by mouth Daily.     • vitamin C (ASCORBIC ACID) 500 MG tablet Take 500 mg by mouth Daily.     • VITAMIN E PO Take  by mouth.     • Zinc 50 MG tablet Take  by mouth.     • ALPRAZolam XR (XANAX XR) 0.5 MG 24 hr tablet Take 1 tablet by mouth Every Morning. 30 tablet 0   • prednisoLONE acetate (PRED FORTE) 1 % ophthalmic suspension 1 drop As Needed.       No  facility-administered medications prior to visit.        Patient Active Problem List   Diagnosis   • Essential hypertension   • Abnormal ECG   • HYATT (dyspnea on exertion)   • RBBB   • LAFB (left anterior fascicular block)   • Anxiety and depression   • Smoker   • Chronic midline low back pain without sciatica   • Family history of early CAD       Advanced Care Planning:  ACP discussion was declined by the patient. Patient does not have an advance directive, declines further assistance.    Review of Systems   Constitutional: Negative for activity change, appetite change, fatigue, fever and unexpected weight change.   HENT: Negative for congestion, ear discharge, ear pain, hearing loss, postnasal drip, rhinorrhea, sinus pressure, tinnitus, trouble swallowing and voice change.    Eyes: Negative for discharge and visual disturbance.   Respiratory: Negative for apnea, cough and shortness of breath.    Cardiovascular: Negative for chest pain, palpitations and leg swelling.   Gastrointestinal: Negative for abdominal pain, blood in stool, constipation and rectal pain.   Endocrine: Negative for cold intolerance, heat intolerance, polydipsia and polyphagia.   Genitourinary: Negative for decreased urine volume, difficulty urinating, frequency, hematuria and urgency.   Musculoskeletal: Negative for arthralgias, back pain, myalgias, neck pain and neck stiffness.   Skin: Negative for color change, rash and wound.   Allergic/Immunologic: Negative for environmental allergies.   Neurological: Negative for dizziness, speech difficulty, weakness, numbness and headaches.   Hematological: Negative for adenopathy. Does not bruise/bleed easily.   Psychiatric/Behavioral: Negative for agitation, confusion, decreased concentration and sleep disturbance. The patient is nervous/anxious.        Compared to one year ago, the patient feels her physical health is better.  Compared to one year ago, the patient feels her mental health is the  "same.    Reviewed chart for potential of high risk medication in the elderly: yes  Reviewed chart for potential of harmful drug interactions in the elderly:yes    Objective         Vitals:    11/23/20 1340   BP: 108/68   BP Location: Left arm   Pulse: 97   Temp: 98.4 °F (36.9 °C)   SpO2: 98%   Weight: 54.4 kg (120 lb)   Height: 172.7 cm (68\")       Body mass index is 18.25 kg/m².  Discussed the patient's BMI with her. The BMI is in the acceptable range.    Physical Exam  Vitals signs and nursing note reviewed.   Constitutional:       Appearance: She is well-developed.   HENT:      Head: Normocephalic and atraumatic.      Right Ear: External ear normal.      Left Ear: External ear normal.      Nose: Nose normal.   Eyes:      Conjunctiva/sclera: Conjunctivae normal.      Pupils: Pupils are equal, round, and reactive to light.   Neck:      Musculoskeletal: Normal range of motion and neck supple.      Thyroid: No thyromegaly.   Cardiovascular:      Rate and Rhythm: Normal rate and regular rhythm.      Heart sounds: Normal heart sounds.   Pulmonary:      Effort: Pulmonary effort is normal.      Breath sounds: Normal breath sounds.   Abdominal:      General: Bowel sounds are normal.      Palpations: Abdomen is soft.   Lymphadenopathy:      Cervical: No cervical adenopathy.   Skin:     General: Skin is warm and dry.   Neurological:      Mental Status: She is alert and oriented to person, place, and time.   Psychiatric:         Behavior: Behavior normal.         Thought Content: Thought content normal.               Assessment/Plan   Medicare Risks and Personalized Health Plan  CMS Preventative Services Quick Reference  Breast Cancer/Mammogram Screening  Cardiovascular risk  Chronic Pain   Colon Cancer Screening  Dementia/Memory   Depression/Dysphoria  Immunizations Discussed/Encouraged (specific immunizations; Influenza, Pneumococcal 23 and Shingrix )  Inadequate Social Support, Isolation, Loneliness, Lack of " Transportation, Financial Difficulties, or Caregiver Stress   Inactivity/Sedentary  Osteoprorosis Risk  Polypharmacy    The above risks/problems have been discussed with the patient.  Pertinent information has been shared with the patient in the After Visit Summary.  Follow up plans and orders are seen below in the Assessment/Plan Section.    Diagnoses and all orders for this visit:      1. Need for shingles vaccine  -     Zoster Vac Recomb Adjuvanted 50 MCG/0.5ML reconstituted suspension; Inject 0.5 mL into the appropriate muscle as directed by prescriber 1 (One) Time for 1 dose.  Dispense: 1 each; Refill: 0    2. Need for pneumococcal vaccine  -     Pneumococcal Polysaccharide Vaccine 23-Valent Greater Than or Equal To 3yo Subcutaneous / IM    3. Medicare annual wellness visit, subsequent      Follow Up:  Return in about 3 months (around 2/23/2021) for Recheck anxiety/depression- viky Vega.     An After Visit Summary and PPPS were given to the patient.

## 2020-11-30 DIAGNOSIS — F41.9 ANXIETY: ICD-10-CM

## 2020-11-30 RX ORDER — ALPRAZOLAM 0.5 MG/1
TABLET, EXTENDED RELEASE ORAL
Qty: 30 TABLET | Refills: 0 | OUTPATIENT
Start: 2020-11-30

## 2020-12-22 DIAGNOSIS — F41.9 ANXIETY: ICD-10-CM

## 2020-12-22 RX ORDER — ALPRAZOLAM 0.5 MG/1
0.5 TABLET, EXTENDED RELEASE ORAL EVERY MORNING
Qty: 30 TABLET | Refills: 0 | Status: SHIPPED | OUTPATIENT
Start: 2020-12-24 | End: 2021-01-25 | Stop reason: SDUPTHER

## 2020-12-22 NOTE — TELEPHONE ENCOUNTER
Caller: Edel Wilson    Relationship: Self    Best call back number: [phone]     Medication needed:   Requested Prescriptions     Pending Prescriptions Disp Refills   • ALPRAZolam XR (XANAX XR) 0.5 MG 24 hr tablet 30 tablet 0     Sig: Take 1 tablet by mouth Every Morning.         What details did the patient provide when requesting the medication: WILL RUN OUT OVER THE WEEKEND\    Does the patient have less than a 3 day supply:  [] Yes  [x] No    What is the patient's preferred pharmacy:    [facility]/pharmacy #[id number] - [place], [place] - [address] AT [address] FROM [address]  [phone] [facility] [phone]

## 2021-01-25 DIAGNOSIS — F41.9 ANXIETY: ICD-10-CM

## 2021-01-25 NOTE — TELEPHONE ENCOUNTER
Caller: Edel Wilson    Relationship: Self    Best call back number: [phone]    Medication needed:   Requested Prescriptions     Pending Prescriptions Disp Refills   • ALPRAZolam XR (XANAX XR) 0.5 MG 24 hr tablet 30 tablet 0     Sig: Take 1 tablet by mouth Every Morning.       When do you need the refill by: 01/26/21    Does the patient have less than a 3 day supply:  [x] Yes  [] No    What is the patient's preferred pharmacy: [facility]/PHARMACY #[id number] - [place] - [address] AT [address] FROM [address]  [phone] [facility] [phone]

## 2021-01-26 RX ORDER — ALPRAZOLAM 0.5 MG/1
0.5 TABLET, EXTENDED RELEASE ORAL EVERY MORNING
Qty: 30 TABLET | Refills: 5 | Status: SHIPPED | OUTPATIENT
Start: 2021-01-26 | End: 2021-02-22 | Stop reason: SDUPTHER

## 2021-02-22 ENCOUNTER — OFFICE VISIT (OUTPATIENT)
Dept: INTERNAL MEDICINE | Facility: CLINIC | Age: 66
End: 2021-02-22

## 2021-02-22 VITALS
DIASTOLIC BLOOD PRESSURE: 70 MMHG | TEMPERATURE: 97.7 F | HEIGHT: 68 IN | BODY MASS INDEX: 18.85 KG/M2 | OXYGEN SATURATION: 99 % | SYSTOLIC BLOOD PRESSURE: 122 MMHG | HEART RATE: 82 BPM | WEIGHT: 124.4 LBS

## 2021-02-22 DIAGNOSIS — F32.A ANXIETY AND DEPRESSION: ICD-10-CM

## 2021-02-22 DIAGNOSIS — F41.9 ANXIETY: ICD-10-CM

## 2021-02-22 DIAGNOSIS — I10 ESSENTIAL HYPERTENSION: Primary | ICD-10-CM

## 2021-02-22 DIAGNOSIS — Z23 NEED FOR SHINGLES VACCINE: ICD-10-CM

## 2021-02-22 DIAGNOSIS — F41.9 ANXIETY AND DEPRESSION: ICD-10-CM

## 2021-02-22 PROCEDURE — 99213 OFFICE O/P EST LOW 20 MIN: CPT | Performed by: NURSE PRACTITIONER

## 2021-02-22 RX ORDER — ALPRAZOLAM 0.5 MG/1
0.5 TABLET, EXTENDED RELEASE ORAL EVERY MORNING
Qty: 30 TABLET | Refills: 5 | Status: SHIPPED | OUTPATIENT
Start: 2021-02-22 | End: 2021-07-21 | Stop reason: SDUPTHER

## 2021-02-22 NOTE — PROGRESS NOTES
Subjective   Edel Wilson is a 65 y.o. female.   Chief Complaint   Patient presents with   • Anxiety     Follow Up   • Shingles Vaccine     Discuss prescription       Edel presents today for a 3 month follow up for anxiety. She is needing a refill on her Xanax.  She reports she is taking this once daily.  She is currently trying to cope with the loss of a family member on 1/27/21.  She is tearful in the office today as she used to drop her off at appointments here.  Edel has family in the area and feels she is coping appropriately but still has moments that are hard.  She moved to this area with this family member.         The following portions of the patient's history were reviewed and updated as appropriate: allergies, current medications, past family history, past medical history, past social history, past surgical history and problem list.    Review of Systems   Constitutional: Negative for activity change, appetite change, fatigue, fever, unexpected weight gain and unexpected weight loss.   HENT: Negative for swollen glands, trouble swallowing and voice change.    Eyes: Negative for blurred vision and visual disturbance.   Respiratory: Negative for cough and shortness of breath.    Cardiovascular: Negative for chest pain, palpitations and leg swelling.   Gastrointestinal: Negative for abdominal pain, constipation, diarrhea, nausea, vomiting and indigestion.   Endocrine: Negative for cold intolerance, heat intolerance, polydipsia and polyphagia.   Genitourinary: Negative for dysuria and frequency.   Musculoskeletal: Negative for arthralgias, back pain, joint swelling and neck pain.   Skin: Negative for color change, rash and skin lesions.   Neurological: Negative for dizziness, weakness, headache, memory problem and confusion.   Hematological: Does not bruise/bleed easily.   Psychiatric/Behavioral: Negative for agitation, hallucinations and suicidal ideas. The patient is nervous/anxious.         Objective   Past Medical History:   Diagnosis Date   • Anxiety    • Cancer (CMS/HCC)     skin   • Depression    • Hypertension       Past Surgical History:   Procedure Laterality Date   • CHOLECYSTECTOMY  1978   • SKIN CANCER EXCISION  2019        Current Outpatient Medications:   •  alendronate (FOSAMAX) 70 MG tablet, Take 1 tablet by mouth Every 7 (Seven) Days., Disp: 4 tablet, Rfl: 11  •  ALPRAZolam XR (XANAX XR) 0.5 MG 24 hr tablet, Take 1 tablet by mouth Every Morning., Disp: 30 tablet, Rfl: 5  •  Calcium Carbonate (CALCIUM 600 PO), Take 1 tablet by mouth Daily., Disp: , Rfl:   •  Cholecalciferol (vitamin D3) 125 MCG (5000 UT) capsule capsule, Take 5,000 Units by mouth Daily., Disp: , Rfl:   •  etodolac (LODINE) 400 MG tablet, Take 1 tablet by mouth 2 (two) times a day., Disp: 60 tablet, Rfl: 5  •  hydroCHLOROthiazide (HYDRODIURIL) 25 MG tablet, Take 25 mg by mouth Daily., Disp: , Rfl:   •  lisinopril (PRINIVIL,ZESTRIL) 20 MG tablet, Take 20 mg by mouth Daily., Disp: , Rfl:   •  Multiple Vitamins-Minerals (MULTIVITAMIN ADULT PO), Take  by mouth., Disp: , Rfl:   •  Omega 3 1200 MG capsule, Take 1 tablet by mouth Daily., Disp: , Rfl:   •  prednisoLONE acetate (PRED FORTE) 1 % ophthalmic suspension, 1 drop As Needed., Disp: , Rfl:   •  vitamin C (ASCORBIC ACID) 500 MG tablet, Take 500 mg by mouth Daily., Disp: , Rfl:   •  VITAMIN E PO, Take  by mouth., Disp: , Rfl:   •  Zinc 50 MG tablet, Take  by mouth., Disp: , Rfl:   •  Zoster Vac Recomb Adjuvanted 50 MCG/0.5ML reconstituted suspension, Inject 0.5 mL into the appropriate muscle as directed by prescriber 1 (One) Time for 1 dose., Disp: 1 each, Rfl: 0     Vitals:    02/22/21 1334   BP: 122/70   Pulse: 82   Temp: 97.7 °F (36.5 °C)   SpO2: 99%         02/22/21  1334   Weight: 56.4 kg (124 lb 6.4 oz)           Physical Exam  Constitutional:       General: She is not in acute distress.     Appearance: She is well-developed.   HENT:      Head: Normocephalic.       Right Ear: Tympanic membrane and external ear normal.      Left Ear: Tympanic membrane and external ear normal.      Mouth/Throat:      Mouth: Mucous membranes are moist. No oral lesions.      Pharynx: Oropharynx is clear.   Eyes:      Conjunctiva/sclera: Conjunctivae normal.   Cardiovascular:      Rate and Rhythm: Normal rate and regular rhythm.      Pulses: Normal pulses.      Heart sounds: Normal heart sounds.   Pulmonary:      Effort: Pulmonary effort is normal.      Breath sounds: Normal breath sounds.   Skin:     General: Skin is warm and dry.   Neurological:      Mental Status: She is alert and oriented to person, place, and time.      Gait: Gait normal.   Psychiatric:         Mood and Affect: Mood normal.         Speech: Speech normal.         Behavior: Behavior normal.         Thought Content: Thought content normal.      Comments: Tearful in the office today               Assessment/Plan   Diagnoses and all orders for this visit:    1. Essential hypertension (Primary)    2. Anxiety and depression    3. Need for shingles vaccine  -     Zoster Vac Recomb Adjuvanted 50 MCG/0.5ML reconstituted suspension; Inject 0.5 mL into the appropriate muscle as directed by prescriber 1 (One) Time for 1 dose.  Dispense: 1 each; Refill: 0      [place] Reviewed.  UDS completed 10/20.  Will pend Xanax for approval.  I have given emotional support during this visit today and requested that if she feels she is not coping well to please return to the office for further evaluation.  She verbalized understanding.     She is also requesting a new Shingrix vaccine Rx be sent to the pharmacy.  She reports her insurance has changed since the initial one was sent and would like to try this again.  She has not yet received the Shingrix vaccine.      BP is well controlled at this time.  Will continue with current regimen.

## 2021-03-19 ENCOUNTER — BULK ORDERING (OUTPATIENT)
Dept: CASE MANAGEMENT | Facility: OTHER | Age: 66
End: 2021-03-19

## 2021-03-19 DIAGNOSIS — Z23 IMMUNIZATION DUE: ICD-10-CM

## 2021-03-24 DIAGNOSIS — F41.9 ANXIETY: ICD-10-CM

## 2021-03-24 RX ORDER — ALPRAZOLAM 0.5 MG/1
0.5 TABLET, EXTENDED RELEASE ORAL EVERY MORNING
Qty: 30 TABLET | Refills: 5 | OUTPATIENT
Start: 2021-03-24

## 2021-03-24 NOTE — TELEPHONE ENCOUNTER
Caller: Edel Wilson    Relationship: Self    Best call back number: [phone]    Medication needed:   Requested Prescriptions     Pending Prescriptions Disp Refills   • ALPRAZolam XR (XANAX XR) 0.5 MG 24 hr tablet 30 tablet 5     Sig: Take 1 tablet by mouth Every Morning.       When do you need the refill by: 3/24/21    What additional details did the patient provide when requesting the medication:     Does the patient have less than a 3 day supply:  [] Yes  [x] No    What is the patient's preferred pharmacy: [facility]/PHARMACY #[id number] - [place], [place] - [address] AT [address] FROM [address]  [phone] [facility] [phone]

## 2021-03-24 NOTE — TELEPHONE ENCOUNTER
Spoke with [facility] pharmacy patient has plenty of refills on file. Per pharm due to be filled on the 28th.

## 2021-04-14 ENCOUNTER — OFFICE VISIT (OUTPATIENT)
Dept: CARDIOLOGY | Facility: CLINIC | Age: 66
End: 2021-04-14

## 2021-04-14 VITALS
DIASTOLIC BLOOD PRESSURE: 75 MMHG | OXYGEN SATURATION: 95 % | HEART RATE: 72 BPM | SYSTOLIC BLOOD PRESSURE: 110 MMHG | BODY MASS INDEX: 19.25 KG/M2 | WEIGHT: 127 LBS | HEIGHT: 68 IN

## 2021-04-14 DIAGNOSIS — F41.9 ANXIETY AND DEPRESSION: ICD-10-CM

## 2021-04-14 DIAGNOSIS — M54.50 CHRONIC MIDLINE LOW BACK PAIN WITHOUT SCIATICA: ICD-10-CM

## 2021-04-14 DIAGNOSIS — R94.31 ABNORMAL ECG: ICD-10-CM

## 2021-04-14 DIAGNOSIS — G89.29 CHRONIC MIDLINE LOW BACK PAIN WITHOUT SCIATICA: ICD-10-CM

## 2021-04-14 DIAGNOSIS — Z82.49 FAMILY HISTORY OF EARLY CAD: ICD-10-CM

## 2021-04-14 DIAGNOSIS — F32.A ANXIETY AND DEPRESSION: ICD-10-CM

## 2021-04-14 DIAGNOSIS — R06.09 DOE (DYSPNEA ON EXERTION): Primary | ICD-10-CM

## 2021-04-14 DIAGNOSIS — I44.4 LAFB (LEFT ANTERIOR FASCICULAR BLOCK): ICD-10-CM

## 2021-04-14 DIAGNOSIS — I10 ESSENTIAL HYPERTENSION: ICD-10-CM

## 2021-04-14 DIAGNOSIS — I45.10 RBBB: ICD-10-CM

## 2021-04-14 DIAGNOSIS — F17.200 SMOKER: ICD-10-CM

## 2021-04-14 PROCEDURE — 99213 OFFICE O/P EST LOW 20 MIN: CPT | Performed by: INTERNAL MEDICINE

## 2021-04-14 PROCEDURE — 93000 ELECTROCARDIOGRAM COMPLETE: CPT | Performed by: INTERNAL MEDICINE

## 2021-04-14 NOTE — PROGRESS NOTES
Edel Wilson  [id number]  1955  65 y.o.  female    Referring Provider: Silvia Servin APRN    Reason for  Visit:  Initial visit for  shortness of breath   Referred for abnormal ECG right  bundle branch block   left anterior fascicular block      Subjective      Overall feels much better   No new events or complaints since last visit   Overall the patient feels no major change from baseline symptoms   Similar symptoms as during last visit     Tolerating current medications well with no untoward side effects   Compliant with prescribed medication regimen. Tries to adhere to cardiac diet.      Mild chronic exertional shortness of breath on exertion relieved with rest  No significant cough or wheezing    No palpitations  No associated chest pain  No significant pedal edema    No fever or chills  No significant expectoration    No hemoptysis  No presyncope or syncope    Tolerating current medications well with no untoward side effects   Compliant with prescribed medication regimen. Tries to adhere to cardiac diet.   Smoker till 4 days ago    Strong family history of early coronary artery disease    Joint pain in small, medium and large joints   Chronic low back pain      No new events or complaints since last visit   No bleeding, excessive bruising, gait instability or fall risks      BP well controlled at home.       History of present illness:  Edel Wilson is a 65 y.o. yo female with history of essential Hypertension    who presents today for   Chief Complaint   Patient presents with   • Hypertension     6 mo f/u   .    History  Past Medical History:   Diagnosis Date   • Anxiety    • Cancer (CMS/HCC)     skin   • Depression    • Hypertension    ,   Past Surgical History:   Procedure Laterality Date   • CHOLECYSTECTOMY  1978   • SKIN CANCER EXCISION  2019   ,   Family History   Problem Relation Age of Onset   • Arthritis Mother    • Hypertension Mother    • Depression Mother    • Cancer Maternal  Grandmother    • Arthritis Paternal Grandmother    • Cancer Paternal Grandmother    • Cancer Other    ,   Social History     Tobacco Use   • Smoking status: Current Some Day Smoker     Packs/day: 0.25     Years: 40.00     Pack years: 10.00     Types: Cigarettes   • Smokeless tobacco: Never Used   • Tobacco comment: occasional; 1 cigarette per day   Substance Use Topics   • Alcohol use: Yes     Alcohol/week: 1.0 - 2.0 standard drinks     Types: 1 - 2 Cans of beer per week     Comment: per patient a night   • Drug use: Never   ,     Medications  Current Outpatient Medications   Medication Sig Dispense Refill   • alendronate (FOSAMAX) 70 MG tablet Take 1 tablet by mouth Every 7 (Seven) Days. 4 tablet 11   • ALPRAZolam XR (XANAX XR) 0.5 MG 24 hr tablet Take 1 tablet by mouth Every Morning. 30 tablet 5   • Calcium Carbonate (CALCIUM 600 PO) Take 1 tablet by mouth Daily.     • Cholecalciferol (vitamin D3) 125 MCG (5000 UT) capsule capsule Take 5,000 Units by mouth Daily.     • etodolac (LODINE) 400 MG tablet Take 1 tablet by mouth 2 (two) times a day. 60 tablet 5   • hydroCHLOROthiazide (HYDRODIURIL) 25 MG tablet Take 25 mg by mouth Daily.     • lisinopril (PRINIVIL,ZESTRIL) 20 MG tablet Take 20 mg by mouth Daily.     • Multiple Vitamins-Minerals (MULTIVITAMIN ADULT PO) Take  by mouth.     • Omega 3 1200 MG capsule Take 1 tablet by mouth Daily.     • prednisoLONE acetate (PRED FORTE) 1 % ophthalmic suspension 1 drop As Needed.     • vitamin C (ASCORBIC ACID) 500 MG tablet Take 500 mg by mouth Daily.     • VITAMIN E PO Take  by mouth.     • Zinc 50 MG tablet Take  by mouth.       No current facility-administered medications for this visit.       Allergies:  Patient has no known allergies.    Review of Systems  Review of Systems   Constitutional: Negative.   HENT: Negative.    Eyes: Negative.    Cardiovascular: Positive for dyspnea on exertion. Negative for chest pain, claudication, cyanosis, irregular heartbeat, leg  "swelling, near-syncope, orthopnea, palpitations, paroxysmal nocturnal dyspnea and syncope.   Respiratory: Negative.    Endocrine: Negative.    Hematologic/Lymphatic: Negative.    Skin: Negative.    Gastrointestinal: Negative for anorexia.   Genitourinary: Negative.    Neurological: Negative.    Psychiatric/Behavioral: Negative.        Objective     Physical Exam:  /75   Pulse 72   Ht 172.7 cm (68\")   Wt 57.6 kg (127 lb)   SpO2 95%   BMI 19.31 kg/m²     Physical Exam   HENT:   Head: Normocephalic.   Eyes: Lids are normal.   Neck: Carotid bruit is not present.   Cardiovascular: Regular rhythm, S1 normal, S2 normal and normal heart sounds.      No systolic murmur is present.  Pulmonary/Chest: Effort normal.   Abdominal: Soft. Normal appearance.   Neurological: She is alert.   Psychiatric: Her speech is normal and behavior is normal. Thought content normal.       Results Review:      Results for orders placed during the hospital encounter of 09/25/20    Adult Transthoracic Echo Complete W/ Cont if Necessary Per Protocol    Interpretation Summary  · Left ventricular ejection fraction appears to be 61 - 65%. Left ventricular systolic function is normal.  · Left ventricular diastolic function was normal  · Estimated right ventricular systolic pressure from tricuspid regurgitation is normal (<35 mmHg).          ECG 12 Lead    Date/Time: 4/14/2021 10:34 AM  Performed by: Giovanni Lino MD  Authorized by: Giovanni Lino MD   Comparison: compared with previous ECG from 8/10/2020  Similar to previous ECG  Rhythm: sinus rhythm  Rate: normal  Conduction: right bundle branch block and left anterior fascicular block  QRS axis: left    Clinical impression: abnormal EKG          ____________________________________________________________________________________________________________________________________________  Health maintenance and recommendations    Low salt/ HTN/ Heart healthy carbohydrate restricted cardiac diet " (printed dietary and general instructions provided for home review )  The patient is advised to reduce or avoid caffeine or other cardiac stimulants.     The patient was advised to avoid long term NSAIDS , use Tylenol PRN instead  Avoid cardiac stimulants including common drugs like Pseudoephedrine or excessive amounts of caffeine  Monitor for any signs of bleeding including red or dark stools. Fall precautions.   Advised staying uptodate with immunizations per established standard guidelines.  Offered to give patient  a copy      Questions were encouraged, asked and answered to the patient's  understanding and satisfaction. Questions if any regarding current medications and side effects, need for refills and importance of compliance to medications stressed.    Reviewed available prior notes, consults, prior visits, laboratory findings, radiology and cardiology relevant reports. Updated chart as applicable. I have reviewed the patient's medical history in detail and updated the computerized patient record as relevant.      Updated patient regarding any new or relevant abnormalities on review of records or any new findings on physical exam. Mentioned to patient about purpose of visit and desirable health short and long term goals and objectives.    Primary to monitor CBC CMP Lipid panel and TSH as applicable    ___________________________________________________________________________________________________________________________________________          Assessment/Plan   Diagnoses and all orders for this visit:    1. HYATT (dyspnea on exertion) (Primary)    2. Abnormal ECG    3. Essential hypertension    4. RBBB    5. LAFB (left anterior fascicular block)    6. Anxiety and depression    7. Smoker    8. Chronic midline low back pain without sciatica    9. Family history of early CAD    Other orders  -     ECG 12 Lead           Plan           Patient expressed understanding  Encouraged and answered all questions    Discussed with the patient and all questioned fully answered. She will call me if any problems arise.   Discussed results of prior testing with patient : echo   as well electrocardiogram from today       Overall doing well no new cardiovascular symptoms and therefore no additional cardiac testing is required   If any interim issues arise will call me for further evaluation.     I support the patient's decision to take the Covid -19 vaccine     Check BP and heart rates twice daily at least 3x / week, week a month  at home and bring a recording for me to review next visit  If BP >130/85 or < 100/60 persistently over 3 reading 30 mins apart call sooner      Monitor for any signs of bleeding including red or dark stools as well as easy bruisabilty. Fall precautions.     Follow up with LUAN Smith to address interim issues             Return in about 6 months (around 10/14/2021).

## 2021-04-19 ENCOUNTER — IMMUNIZATION (OUTPATIENT)
Dept: VACCINE CLINIC | Facility: HOSPITAL | Age: 66
End: 2021-04-19

## 2021-04-19 ENCOUNTER — APPOINTMENT (OUTPATIENT)
Dept: VACCINE CLINIC | Facility: HOSPITAL | Age: 66
End: 2021-04-19

## 2021-04-19 PROCEDURE — 0011A: CPT | Performed by: OBSTETRICS & GYNECOLOGY

## 2021-04-19 PROCEDURE — 91301 HC SARSCO02 VAC 100MCG/0.5ML IM: CPT | Performed by: OBSTETRICS & GYNECOLOGY

## 2021-04-19 RX ORDER — ETODOLAC 400 MG/1
TABLET, FILM COATED ORAL
Qty: 60 TABLET | Refills: 5 | Status: SHIPPED | OUTPATIENT
Start: 2021-04-19 | End: 2021-09-22

## 2021-04-26 ENCOUNTER — APPOINTMENT (OUTPATIENT)
Dept: VACCINE CLINIC | Facility: HOSPITAL | Age: 66
End: 2021-04-26

## 2021-05-17 ENCOUNTER — IMMUNIZATION (OUTPATIENT)
Dept: VACCINE CLINIC | Facility: HOSPITAL | Age: 66
End: 2021-05-17

## 2021-05-17 PROCEDURE — 0012A: CPT | Performed by: OBSTETRICS & GYNECOLOGY

## 2021-05-17 PROCEDURE — 91301 HC SARSCO02 VAC 100MCG/0.5ML IM: CPT | Performed by: OBSTETRICS & GYNECOLOGY

## 2021-05-24 ENCOUNTER — OFFICE VISIT (OUTPATIENT)
Dept: INTERNAL MEDICINE | Facility: CLINIC | Age: 66
End: 2021-05-24

## 2021-05-24 ENCOUNTER — HOSPITAL ENCOUNTER (OUTPATIENT)
Dept: GENERAL RADIOLOGY | Facility: HOSPITAL | Age: 66
Discharge: HOME | End: 2021-05-24
Admitting: NURSE PRACTITIONER

## 2021-05-24 VITALS
DIASTOLIC BLOOD PRESSURE: 72 MMHG | BODY MASS INDEX: 19.58 KG/M2 | OXYGEN SATURATION: 100 % | WEIGHT: 129.2 LBS | HEIGHT: 68 IN | SYSTOLIC BLOOD PRESSURE: 126 MMHG | HEART RATE: 73 BPM | TEMPERATURE: 97.1 F

## 2021-05-24 DIAGNOSIS — M65.9 TENOSYNOVITIS: ICD-10-CM

## 2021-05-24 DIAGNOSIS — M79.602 ARM PAIN, ANTERIOR, LEFT: ICD-10-CM

## 2021-05-24 DIAGNOSIS — Z91.81 HISTORY OF RECENT FALL: ICD-10-CM

## 2021-05-24 DIAGNOSIS — I10 ESSENTIAL HYPERTENSION: Primary | ICD-10-CM

## 2021-05-24 PROCEDURE — 99213 OFFICE O/P EST LOW 20 MIN: CPT | Performed by: NURSE PRACTITIONER

## 2021-05-24 PROCEDURE — 73030 X-RAY EXAM OF SHOULDER: CPT

## 2021-05-24 PROCEDURE — 73060 X-RAY EXAM OF HUMERUS: CPT

## 2021-05-24 RX ORDER — METHYLPREDNISOLONE 4 MG/1
TABLET ORAL
Qty: 1 EACH | Refills: 0 | Status: SHIPPED | OUTPATIENT
Start: 2021-05-24 | End: 2021-10-14

## 2021-05-24 NOTE — PROGRESS NOTES
X-rays are unremarkable.  Continue with treatment plan in the office and can move forward with physical therapy if needed

## 2021-05-24 NOTE — PROGRESS NOTES
"Subjective   Edel Wilson is a 65 y.o. female.   Chief Complaint   Patient presents with   • Hypertension   • Arm Pain     Left arm tenderness x 1.5 months       Michelle presents today for her 6 month follow up for the treatment of hypertension.  She also complains today of left upper extremity pain.  BP is 126/72 in office today.  She does monitor this at home every few days and reports this running within this range as well.    Her mood is improvement since the loss of her good friend who she moved to the area with.  She continues on daily xanax and states she is managing her grief well.  She states \"its a process\" but overall reports she is doing well.      In regards to her arm, she states she was taking her dogs for a walk about 1 1/2 months ago and got tripped over them causing her to fall, landing on her left arm.  She states at first it wasn't too bothersome but then started to notice discomfort with tasks such as reaching for the toilet paper.  She is now unable to get her arm all the way over her head.  The pain is located on the outside of her upper arm extending downward.  There is no neck pain.  She has been taking anti-inflammatories already prescribed for the pain.        The following portions of the patient's history were reviewed and updated as appropriate: allergies, current medications, past family history, past medical history, past social history, past surgical history and problem list.    Review of Systems   Constitutional: Negative for activity change, appetite change, fatigue, fever, unexpected weight gain and unexpected weight loss.   HENT: Negative for swollen glands, trouble swallowing and voice change.    Eyes: Negative for blurred vision and visual disturbance.   Respiratory: Negative for cough and shortness of breath.    Cardiovascular: Negative for chest pain, palpitations and leg swelling.   Gastrointestinal: Negative for abdominal pain, constipation, diarrhea, nausea, vomiting " and indigestion.   Endocrine: Negative for cold intolerance, heat intolerance, polydipsia and polyphagia.   Genitourinary: Negative for dysuria and frequency.   Musculoskeletal: Negative for arthralgias, back pain, joint swelling and neck pain.        Left arm pain   Skin: Negative for color change, rash and skin lesions.   Neurological: Negative for dizziness, weakness, headache, memory problem and confusion.   Hematological: Does not bruise/bleed easily.   Psychiatric/Behavioral: Negative for agitation, hallucinations and suicidal ideas. The patient is not nervous/anxious.        Objective   Past Medical History:   Diagnosis Date   • Anxiety    • Cancer (CMS/HCC)     skin   • Depression    • Hypertension       Past Surgical History:   Procedure Laterality Date   • CHOLECYSTECTOMY  1978   • SKIN CANCER EXCISION  2019        Current Outpatient Medications:   •  alendronate (FOSAMAX) 70 MG tablet, Take 1 tablet by mouth Every 7 (Seven) Days., Disp: 4 tablet, Rfl: 11  •  ALPRAZolam XR (XANAX XR) 0.5 MG 24 hr tablet, Take 1 tablet by mouth Every Morning., Disp: 30 tablet, Rfl: 5  •  Calcium Carbonate (CALCIUM 600 PO), Take 1 tablet by mouth Daily., Disp: , Rfl:   •  Cholecalciferol (vitamin D3) 125 MCG (5000 UT) capsule capsule, Take 5,000 Units by mouth Daily., Disp: , Rfl:   •  etodolac (LODINE) 400 MG tablet, TAKE 1 TABLET BY MOUTH TWICE A DAY, Disp: 60 tablet, Rfl: 5  •  hydroCHLOROthiazide (HYDRODIURIL) 25 MG tablet, Take 25 mg by mouth Daily., Disp: , Rfl:   •  lisinopril (PRINIVIL,ZESTRIL) 20 MG tablet, Take 20 mg by mouth Daily., Disp: , Rfl:   •  Multiple Vitamins-Minerals (MULTIVITAMIN ADULT PO), Take  by mouth., Disp: , Rfl:   •  Omega 3 1200 MG capsule, Take 1 tablet by mouth Daily., Disp: , Rfl:   •  prednisoLONE acetate (PRED FORTE) 1 % ophthalmic suspension, 1 drop As Needed., Disp: , Rfl:   •  vitamin C (ASCORBIC ACID) 500 MG tablet, Take 500 mg by mouth Daily., Disp: , Rfl:   •  VITAMIN E PO, Take  by  mouth., Disp: , Rfl:   •  Zinc 50 MG tablet, Take  by mouth., Disp: , Rfl:   •  methylPREDNISolone (MEDROL) 4 MG dose pack, Take as directed on package instructions., Disp: 1 each, Rfl: 0     Vitals:    05/24/21 1306   BP: 126/72   Pulse: 73   Temp: 97.1 °F (36.2 °C)   SpO2: 100%         05/24/21  1306   Weight: 58.6 kg (129 lb 3.2 oz)         Physical Exam  HENT:      Right Ear: Tympanic membrane and external ear normal.      Left Ear: Tympanic membrane and external ear normal.      Nose: Nose normal.      Mouth/Throat:      Mouth: Mucous membranes are moist. No oral lesions.      Pharynx: Oropharynx is clear.   Eyes:      Conjunctiva/sclera: Conjunctivae normal.      Pupils: Pupils are equal, round, and reactive to light.   Neck:      Thyroid: No thyromegaly.   Cardiovascular:      Rate and Rhythm: Normal rate and regular rhythm.      Pulses: Normal pulses.           Radial pulses are 2+ on the right side and 2+ on the left side.      Heart sounds: Normal heart sounds.   Pulmonary:      Effort: Pulmonary effort is normal.      Breath sounds: Normal breath sounds.   Musculoskeletal:      Left shoulder: No tenderness or bony tenderness. Decreased range of motion (Unable to full get arm above the head or behind the back). Normal strength.      Left upper arm: Tenderness (Down the lateral aspect of the arm with palpation and certain movements) present.      Cervical back: Normal range of motion.      Right lower leg: No edema.      Left lower leg: No edema.      Comments: Positive Finkelstein test   Lymphadenopathy:      Cervical: No cervical adenopathy.   Skin:     General: Skin is warm and dry.   Neurological:      Mental Status: She is alert and oriented to person, place, and time.      Gait: Gait normal.   Psychiatric:         Mood and Affect: Mood normal.         Speech: Speech normal.         Behavior: Behavior normal.         Thought Content: Thought content normal.               Assessment/Plan   Diagnoses and  all orders for this visit:    1. Essential hypertension (Primary)  -     Basic Metabolic Panel    2. Tenosynovitis  -     methylPREDNISolone (MEDROL) 4 MG dose pack; Take as directed on package instructions.  Dispense: 1 each; Refill: 0    3. Arm pain, anterior, left  -     XR Humerus Left; Future  -     XR Shoulder 2+ View Left; Future    4. History of recent fall      Will get some x-ray imaging of the arm to rule out any fractures related to the fall.  I suspect her is dealing with more of a tendonitis issue.  Will cover with medrol dose pack at this time.  I have discussed physical therapy as well as additional imaging if symptoms do not start to improve or worsen.  She is agreeable to this plan.    BP is stable and well controlled.  No change to therapy.  Will get BMP today to evaluate renal function.   Mood is improving from recent loss of friend.

## 2021-05-25 LAB
BUN SERPL-MCNC: 32 MG/DL (ref 8–23)
BUN/CREAT SERPL: 33 (ref 7–25)
CALCIUM SERPL-MCNC: 9.3 MG/DL (ref 8.6–10.5)
CHLORIDE SERPL-SCNC: 105 MMOL/L (ref 98–107)
CO2 SERPL-SCNC: 27.2 MMOL/L (ref 22–29)
CREAT SERPL-MCNC: 0.97 MG/DL (ref 0.57–1)
GLUCOSE SERPL-MCNC: 85 MG/DL (ref 65–99)
POTASSIUM SERPL-SCNC: 4.8 MMOL/L (ref 3.5–5.2)
SODIUM SERPL-SCNC: 142 MMOL/L (ref 136–145)

## 2021-05-26 ENCOUNTER — TELEPHONE (OUTPATIENT)
Dept: INTERNAL MEDICINE | Facility: CLINIC | Age: 66
End: 2021-05-26

## 2021-05-26 NOTE — TELEPHONE ENCOUNTER
----- Message from LUAN Metz sent at 5/25/2021  7:43 AM CDT -----  Stable BMP.  Be sure to drink plenty of water.

## 2021-05-26 NOTE — TELEPHONE ENCOUNTER
Patient was called and informed of results. She will call back if she has any further questions or concerns.

## 2021-07-02 RX ORDER — HYDROCHLOROTHIAZIDE 25 MG/1
25 TABLET ORAL DAILY
Qty: 90 TABLET | Refills: 3 | Status: SHIPPED | OUTPATIENT
Start: 2021-07-02 | End: 2023-02-17 | Stop reason: SDUPTHER

## 2021-07-02 RX ORDER — LISINOPRIL 20 MG/1
20 TABLET ORAL DAILY
Qty: 90 TABLET | Refills: 3 | Status: SHIPPED | OUTPATIENT
Start: 2021-07-02 | End: 2022-09-29 | Stop reason: SDUPTHER

## 2021-07-02 NOTE — TELEPHONE ENCOUNTER
Caller: Edel Wilson    Relationship: Self    Best call back number: [phone]    Medication needed:   Requested Prescriptions     Pending Prescriptions Disp Refills   • lisinopril (PRINIVIL,ZESTRIL) 20 MG tablet       Sig: Take 1 tablet by mouth Daily.   • hydroCHLOROthiazide (HYDRODIURIL) 25 MG tablet       Sig: Take 1 tablet by mouth Daily.       When do you need the refill by: ASAP    What additional details did the patient provide when requesting the medication:     Patient was seen 1 month ago. The states the steroid is not working. She would like to be referred to pain management. Please advise.     Does the patient have less than a 3 day supply:  [] Yes  [x] No    What is the patient's preferred pharmacy: [facility]/PHARMACY #[id number] - [place] - [address] AT [address] FROM [address]  [phone] [facility] [phone] FX

## 2021-07-06 ENCOUNTER — OFFICE VISIT (OUTPATIENT)
Dept: INTERNAL MEDICINE | Facility: CLINIC | Age: 66
End: 2021-07-06

## 2021-07-06 VITALS
WEIGHT: 127.2 LBS | SYSTOLIC BLOOD PRESSURE: 122 MMHG | BODY MASS INDEX: 19.28 KG/M2 | HEIGHT: 68 IN | HEART RATE: 80 BPM | TEMPERATURE: 97.3 F | DIASTOLIC BLOOD PRESSURE: 72 MMHG | OXYGEN SATURATION: 98 %

## 2021-07-06 DIAGNOSIS — M25.512 ACUTE PAIN OF LEFT SHOULDER: Primary | ICD-10-CM

## 2021-07-06 PROCEDURE — 99213 OFFICE O/P EST LOW 20 MIN: CPT | Performed by: NURSE PRACTITIONER

## 2021-07-06 NOTE — PROGRESS NOTES
Subjective   Edel Wilson is a 66 y.o. female.   Chief Complaint   Patient presents with   • Follow-up     Left Shoulder ROM dercreased, increased sensitivity        Edel presents today with continued complaints of left upper extremity pain.  She completed a medrol dose pack with x-ray imaging and states she didn't notice any improvement.  She complaints of limited range of motion and more discomfort to the shoulder.   Pain is primarily with movement.  She has been avoiding using the arm related to pain and continues with daily Etodolac.         The following portions of the patient's history were reviewed and updated as appropriate: allergies, current medications, past family history, past medical history, past social history, past surgical history and problem list.    Review of Systems   Constitutional: Negative for activity change, appetite change, fatigue, fever, unexpected weight gain and unexpected weight loss.   HENT: Negative for swollen glands, trouble swallowing and voice change.    Eyes: Negative for blurred vision and visual disturbance.   Respiratory: Negative for cough and shortness of breath.    Cardiovascular: Negative for chest pain, palpitations and leg swelling.   Gastrointestinal: Negative for abdominal pain, constipation, diarrhea, nausea, vomiting and indigestion.   Endocrine: Negative for cold intolerance, heat intolerance, polydipsia and polyphagia.   Genitourinary: Negative for dysuria and frequency.   Musculoskeletal: Negative for arthralgias, back pain, joint swelling and neck pain.        Left shoulder/arm pain   Skin: Negative for color change, rash and skin lesions.   Neurological: Negative for dizziness, weakness, headache, memory problem and confusion.   Hematological: Does not bruise/bleed easily.   Psychiatric/Behavioral: Negative for agitation, hallucinations and suicidal ideas. The patient is not nervous/anxious.        Objective   Past Medical History:   Diagnosis Date    • Anxiety    • Cancer (CMS/HCC)     skin   • Depression    • Hypertension       Past Surgical History:   Procedure Laterality Date   • CHOLECYSTECTOMY  1978   • SKIN CANCER EXCISION  2019        Current Outpatient Medications:   •  alendronate (FOSAMAX) 70 MG tablet, Take 1 tablet by mouth Every 7 (Seven) Days., Disp: 4 tablet, Rfl: 11  •  ALPRAZolam XR (XANAX XR) 0.5 MG 24 hr tablet, Take 1 tablet by mouth Every Morning., Disp: 30 tablet, Rfl: 5  •  Calcium Carbonate (CALCIUM 600 PO), Take 1 tablet by mouth Daily., Disp: , Rfl:   •  Cholecalciferol (vitamin D3) 125 MCG (5000 UT) capsule capsule, Take 5,000 Units by mouth Daily., Disp: , Rfl:   •  etodolac (LODINE) 400 MG tablet, TAKE 1 TABLET BY MOUTH TWICE A DAY, Disp: 60 tablet, Rfl: 5  •  hydroCHLOROthiazide (HYDRODIURIL) 25 MG tablet, Take 1 tablet by mouth Daily., Disp: 90 tablet, Rfl: 3  •  lisinopril (PRINIVIL,ZESTRIL) 20 MG tablet, Take 1 tablet by mouth Daily., Disp: 90 tablet, Rfl: 3  •  methylPREDNISolone (MEDROL) 4 MG dose pack, Take as directed on package instructions., Disp: 1 each, Rfl: 0  •  Multiple Vitamins-Minerals (MULTIVITAMIN ADULT PO), Take  by mouth., Disp: , Rfl:   •  Omega 3 1200 MG capsule, Take 1 tablet by mouth Daily., Disp: , Rfl:   •  prednisoLONE acetate (PRED FORTE) 1 % ophthalmic suspension, 1 drop As Needed., Disp: , Rfl:   •  vitamin C (ASCORBIC ACID) 500 MG tablet, Take 500 mg by mouth Daily., Disp: , Rfl:   •  VITAMIN E PO, Take  by mouth., Disp: , Rfl:   •  Zinc 50 MG tablet, Take  by mouth., Disp: , Rfl:      Vitals:    07/06/21 1132   BP: 122/72   Pulse: 80   Temp: 97.3 °F (36.3 °C)   SpO2: 98%         07/06/21  1132   Weight: 57.7 kg (127 lb 3.2 oz)         Physical Exam  Constitutional:       General: She is not in acute distress.     Appearance: She is well-developed.   HENT:      Head: Normocephalic.      Right Ear: External ear normal.      Left Ear: External ear normal.      Mouth/Throat:      Mouth: No oral lesions.    Eyes:      Conjunctiva/sclera: Conjunctivae normal.   Cardiovascular:      Rate and Rhythm: Normal rate and regular rhythm.      Heart sounds: Normal heart sounds.   Pulmonary:      Effort: Pulmonary effort is normal.      Breath sounds: Normal breath sounds.   Musculoskeletal:      Left shoulder: Tenderness present. No swelling. Decreased range of motion. Decreased strength.   Skin:     General: Skin is warm and dry.   Neurological:      Mental Status: She is alert and oriented to person, place, and time.      Gait: Gait normal.   Psychiatric:         Mood and Affect: Mood normal.         Speech: Speech normal.         Behavior: Behavior normal.         Thought Content: Thought content normal.               Assessment/Plan   Diagnoses and all orders for this visit:    1. Acute pain of left shoulder (Primary)  -     MRI Shoulder Left Without Contrast; Future  -     Ambulatory Referral to Orthopedic Surgery    At the previous visit her pain was more localized to the left anterior arm.  Today she complains of more pain to the shoulder extending down the arm.  She has a few falls prior to this starting.  Will go ahead and refer to Ortho and schedule MRI of the left shoulder to evaluate further.  She is agreeable to this.  Will hold on Physical therapy until after MRI is completed.

## 2021-07-14 ENCOUNTER — HOSPITAL ENCOUNTER (OUTPATIENT)
Dept: MRI IMAGING | Facility: HOSPITAL | Age: 66
Discharge: HOME | End: 2021-07-14
Admitting: NURSE PRACTITIONER

## 2021-07-14 DIAGNOSIS — M25.512 ACUTE PAIN OF LEFT SHOULDER: ICD-10-CM

## 2021-07-14 PROCEDURE — 73221 MRI JOINT UPR EXTREM W/O DYE: CPT

## 2021-07-15 NOTE — PROGRESS NOTES
MRI shows mild bursitis and a small tear to a tendon. No full-thickness tear.  Please refer to Ortho

## 2021-07-21 DIAGNOSIS — F41.9 ANXIETY: ICD-10-CM

## 2021-07-21 RX ORDER — ALPRAZOLAM 0.5 MG/1
0.5 TABLET, EXTENDED RELEASE ORAL EVERY MORNING
Qty: 30 TABLET | Refills: 5 | Status: SHIPPED | OUTPATIENT
Start: 2021-07-21 | End: 2022-01-18 | Stop reason: SDUPTHER

## 2021-07-21 NOTE — TELEPHONE ENCOUNTER
Caller: Edel Wilson    Relationship: Self    Best call back number: [phone]    Medication needed:   Requested Prescriptions     Pending Prescriptions Disp Refills   • ALPRAZolam XR (XANAX XR) 0.5 MG 24 hr tablet 30 tablet 5     Sig: Take 1 tablet by mouth Every Morning.       What is the patient's preferred pharmacy: [id number] - [place], [place] - [address] AT ACROSS FROM [address]  [phone] [facility] [phone]

## 2021-08-12 ENCOUNTER — TRANSCRIBE ORDERS (OUTPATIENT)
Dept: LAB | Facility: HOSPITAL | Age: 66
End: 2021-08-12

## 2021-08-12 DIAGNOSIS — Z01.818 PREOPERATIVE TESTING: Primary | ICD-10-CM

## 2021-08-16 ENCOUNTER — LAB (OUTPATIENT)
Dept: LAB | Facility: HOSPITAL | Age: 66
End: 2021-08-16

## 2021-08-16 LAB — SARS-COV-2 ORF1AB RESP QL NAA+PROBE: NOT DETECTED

## 2021-08-16 PROCEDURE — U0005 INFEC AGEN DETEC AMPLI PROBE: HCPCS | Performed by: ORTHOPAEDIC SURGERY

## 2021-08-16 PROCEDURE — C9803 HOPD COVID-19 SPEC COLLECT: HCPCS | Performed by: ORTHOPAEDIC SURGERY

## 2021-08-16 PROCEDURE — U0004 COV-19 TEST NON-CDC HGH THRU: HCPCS | Performed by: ORTHOPAEDIC SURGERY

## 2021-08-17 ENCOUNTER — TRANSCRIBE ORDERS (OUTPATIENT)
Dept: LAB | Facility: HOSPITAL | Age: 66
End: 2021-08-17

## 2021-08-18 ENCOUNTER — ANESTHESIA EVENT (OUTPATIENT)
Dept: PREOP | Facility: HOSPITAL | Age: 66
End: 2021-08-18

## 2021-08-19 ENCOUNTER — HOSPITAL ENCOUNTER (OUTPATIENT)
Dept: PREOP | Facility: HOSPITAL | Age: 66
Setting detail: HOSPITAL OUTPATIENT SURGERY
Discharge: HOME | End: 2021-08-19

## 2021-08-19 ENCOUNTER — ANESTHESIA (OUTPATIENT)
Dept: PREOP | Facility: HOSPITAL | Age: 66
End: 2021-08-19

## 2021-08-19 VITALS
OXYGEN SATURATION: 99 % | BODY MASS INDEX: 19.86 KG/M2 | DIASTOLIC BLOOD PRESSURE: 70 MMHG | RESPIRATION RATE: 16 BRPM | HEART RATE: 72 BPM | SYSTOLIC BLOOD PRESSURE: 141 MMHG | WEIGHT: 126.54 LBS | HEIGHT: 67 IN | TEMPERATURE: 96.9 F

## 2021-08-19 DIAGNOSIS — M75.02 ADHESIVE CAPSULITIS OF LEFT SHOULDER: Primary | ICD-10-CM

## 2021-08-19 PROCEDURE — 25010000002 ROPIVACAINE PER 1 MG: Performed by: ANESTHESIOLOGY

## 2021-08-19 PROCEDURE — 25010000002 ROPIVACAINE PER 1 MG: Performed by: ORTHOPAEDIC SURGERY

## 2021-08-19 PROCEDURE — 76942 ECHO GUIDE FOR BIOPSY: CPT

## 2021-08-19 PROCEDURE — 25010000002 METHYLPREDNISOLONE PER 80 MG: Performed by: ORTHOPAEDIC SURGERY

## 2021-08-19 PROCEDURE — 25010000002 PROPOFOL 10 MG/ML EMULSION: Performed by: NURSE ANESTHETIST, CERTIFIED REGISTERED

## 2021-08-19 PROCEDURE — 25010000002 MIDAZOLAM PER 1 MG: Performed by: ANESTHESIOLOGY

## 2021-08-19 PROCEDURE — 25010000002 FENTANYL CITRATE (PF) 50 MCG/ML SOLUTION: Performed by: ANESTHESIOLOGY

## 2021-08-19 RX ORDER — PROPOFOL 10 MG/ML
VIAL (ML) INTRAVENOUS AS NEEDED
Status: DISCONTINUED | OUTPATIENT
Start: 2021-08-19 | End: 2021-08-19 | Stop reason: SURG

## 2021-08-19 RX ORDER — SODIUM CHLORIDE, SODIUM LACTATE, POTASSIUM CHLORIDE, CALCIUM CHLORIDE 600; 310; 30; 20 MG/100ML; MG/100ML; MG/100ML; MG/100ML
9 INJECTION, SOLUTION INTRAVENOUS CONTINUOUS
Status: DISCONTINUED | OUTPATIENT
Start: 2021-08-19 | End: 2021-08-20 | Stop reason: HOSPADM

## 2021-08-19 RX ORDER — OXYCODONE AND ACETAMINOPHEN 10; 325 MG/1; MG/1
1 TABLET ORAL ONCE AS NEEDED
Status: DISCONTINUED | OUTPATIENT
Start: 2021-08-19 | End: 2021-08-20 | Stop reason: HOSPADM

## 2021-08-19 RX ORDER — FENTANYL CITRATE 50 UG/ML
25 INJECTION, SOLUTION INTRAMUSCULAR; INTRAVENOUS
Status: DISCONTINUED | OUTPATIENT
Start: 2021-08-19 | End: 2021-08-20 | Stop reason: HOSPADM

## 2021-08-19 RX ORDER — OXYCODONE AND ACETAMINOPHEN 7.5; 325 MG/1; MG/1
2 TABLET ORAL EVERY 4 HOURS PRN
Status: DISCONTINUED | OUTPATIENT
Start: 2021-08-19 | End: 2021-08-20 | Stop reason: HOSPADM

## 2021-08-19 RX ORDER — SODIUM CHLORIDE 0.9 % (FLUSH) 0.9 %
3 SYRINGE (ML) INJECTION AS NEEDED
Status: DISCONTINUED | OUTPATIENT
Start: 2021-08-19 | End: 2021-08-20 | Stop reason: HOSPADM

## 2021-08-19 RX ORDER — LIDOCAINE HYDROCHLORIDE 10 MG/ML
0.5 INJECTION, SOLUTION EPIDURAL; INFILTRATION; INTRACAUDAL; PERINEURAL ONCE AS NEEDED
Status: DISCONTINUED | OUTPATIENT
Start: 2021-08-19 | End: 2021-08-20 | Stop reason: HOSPADM

## 2021-08-19 RX ORDER — NALOXONE HCL 0.4 MG/ML
0.4 VIAL (ML) INJECTION AS NEEDED
Status: DISCONTINUED | OUTPATIENT
Start: 2021-08-19 | End: 2021-08-20 | Stop reason: HOSPADM

## 2021-08-19 RX ORDER — FLUMAZENIL 0.1 MG/ML
0.2 INJECTION INTRAVENOUS AS NEEDED
Status: DISCONTINUED | OUTPATIENT
Start: 2021-08-19 | End: 2021-08-20 | Stop reason: HOSPADM

## 2021-08-19 RX ORDER — LIDOCAINE HYDROCHLORIDE 20 MG/ML
INJECTION, SOLUTION EPIDURAL; INFILTRATION; INTRACAUDAL; PERINEURAL AS NEEDED
Status: DISCONTINUED | OUTPATIENT
Start: 2021-08-19 | End: 2021-08-19 | Stop reason: SURG

## 2021-08-19 RX ORDER — METHYLPREDNISOLONE ACETATE 80 MG/ML
80 INJECTION, SUSPENSION INTRA-ARTICULAR; INTRALESIONAL; INTRAMUSCULAR; SOFT TISSUE ONCE
Status: COMPLETED | OUTPATIENT
Start: 2021-08-19 | End: 2021-08-19

## 2021-08-19 RX ORDER — SODIUM CHLORIDE, SODIUM LACTATE, POTASSIUM CHLORIDE, CALCIUM CHLORIDE 600; 310; 30; 20 MG/100ML; MG/100ML; MG/100ML; MG/100ML
1000 INJECTION, SOLUTION INTRAVENOUS CONTINUOUS
Status: DISCONTINUED | OUTPATIENT
Start: 2021-08-19 | End: 2021-08-20 | Stop reason: HOSPADM

## 2021-08-19 RX ORDER — ONDANSETRON 2 MG/ML
4 INJECTION INTRAMUSCULAR; INTRAVENOUS ONCE AS NEEDED
Status: DISCONTINUED | OUTPATIENT
Start: 2021-08-19 | End: 2021-08-20 | Stop reason: HOSPADM

## 2021-08-19 RX ORDER — SODIUM CHLORIDE 0.9 % (FLUSH) 0.9 %
10 SYRINGE (ML) INJECTION AS NEEDED
Status: DISCONTINUED | OUTPATIENT
Start: 2021-08-19 | End: 2021-08-20 | Stop reason: HOSPADM

## 2021-08-19 RX ORDER — SODIUM CHLORIDE 0.9 % (FLUSH) 0.9 %
10 SYRINGE (ML) INJECTION EVERY 12 HOURS SCHEDULED
Status: DISCONTINUED | OUTPATIENT
Start: 2021-08-19 | End: 2021-08-20 | Stop reason: HOSPADM

## 2021-08-19 RX ORDER — LABETALOL HYDROCHLORIDE 5 MG/ML
5 INJECTION, SOLUTION INTRAVENOUS
Status: DISCONTINUED | OUTPATIENT
Start: 2021-08-19 | End: 2021-08-20 | Stop reason: HOSPADM

## 2021-08-19 RX ORDER — DEXTROSE MONOHYDRATE 25 G/50ML
12.5 INJECTION, SOLUTION INTRAVENOUS AS NEEDED
Status: DISCONTINUED | OUTPATIENT
Start: 2021-08-19 | End: 2021-08-20 | Stop reason: HOSPADM

## 2021-08-19 RX ORDER — ONDANSETRON 4 MG/1
4 TABLET, FILM COATED ORAL EVERY 8 HOURS PRN
Qty: 20 TABLET | Refills: 0 | Status: SHIPPED | OUTPATIENT
Start: 2021-08-19 | End: 2021-10-14

## 2021-08-19 RX ORDER — FENTANYL CITRATE 50 UG/ML
50 INJECTION, SOLUTION INTRAMUSCULAR; INTRAVENOUS ONCE
Status: COMPLETED | OUTPATIENT
Start: 2021-08-19 | End: 2021-08-19

## 2021-08-19 RX ORDER — HYDROCODONE BITARTRATE AND ACETAMINOPHEN 7.5; 325 MG/1; MG/1
1 TABLET ORAL ONCE AS NEEDED
Status: DISCONTINUED | OUTPATIENT
Start: 2021-08-19 | End: 2021-08-20 | Stop reason: HOSPADM

## 2021-08-19 RX ORDER — ROPIVACAINE HYDROCHLORIDE 5 MG/ML
8 INJECTION, SOLUTION EPIDURAL; INFILTRATION; PERINEURAL ONCE
Status: COMPLETED | OUTPATIENT
Start: 2021-08-19 | End: 2021-08-19

## 2021-08-19 RX ORDER — IBUPROFEN 600 MG/1
600 TABLET ORAL ONCE AS NEEDED
Status: DISCONTINUED | OUTPATIENT
Start: 2021-08-19 | End: 2021-08-20 | Stop reason: HOSPADM

## 2021-08-19 RX ORDER — IBUPROFEN 600 MG/1
600 TABLET ORAL EVERY 6 HOURS PRN
Status: DISCONTINUED | OUTPATIENT
Start: 2021-08-19 | End: 2021-08-20 | Stop reason: HOSPADM

## 2021-08-19 RX ORDER — ROPIVACAINE HYDROCHLORIDE 5 MG/ML
INJECTION, SOLUTION EPIDURAL; INFILTRATION; PERINEURAL
Status: COMPLETED | OUTPATIENT
Start: 2021-08-19 | End: 2021-08-19

## 2021-08-19 RX ORDER — MIDAZOLAM HYDROCHLORIDE 1 MG/ML
0.5 INJECTION INTRAMUSCULAR; INTRAVENOUS
Status: DISCONTINUED | OUTPATIENT
Start: 2021-08-19 | End: 2021-08-20 | Stop reason: HOSPADM

## 2021-08-19 RX ORDER — HYDROCODONE BITARTRATE AND ACETAMINOPHEN 10; 325 MG/1; MG/1
1 TABLET ORAL EVERY 4 HOURS PRN
Qty: 28 TABLET | Refills: 0 | Status: SHIPPED | OUTPATIENT
Start: 2021-08-19 | End: 2021-12-03

## 2021-08-19 RX ADMIN — FENTANYL CITRATE 50 MCG: 50 INJECTION, SOLUTION INTRAMUSCULAR; INTRAVENOUS at 06:40

## 2021-08-19 RX ADMIN — ROPIVACAINE HYDROCHLORIDE 8 ML: 5 INJECTION, SOLUTION EPIDURAL; INFILTRATION; PERINEURAL at 07:07

## 2021-08-19 RX ADMIN — PROPOFOL 80 MG: 10 INJECTION, EMULSION INTRAVENOUS at 07:08

## 2021-08-19 RX ADMIN — ROPIVACAINE HYDROCHLORIDE 20 ML: 5 INJECTION, SOLUTION EPIDURAL; INFILTRATION; PERINEURAL at 06:46

## 2021-08-19 RX ADMIN — METHYLPREDNISOLONE ACETATE 80 MG: 80 INJECTION, SUSPENSION INTRA-ARTICULAR; INTRALESIONAL; INTRAMUSCULAR; SOFT TISSUE at 07:07

## 2021-08-19 RX ADMIN — SODIUM CHLORIDE, POTASSIUM CHLORIDE, SODIUM LACTATE AND CALCIUM CHLORIDE 1000 ML: 600; 310; 30; 20 INJECTION, SOLUTION INTRAVENOUS at 06:05

## 2021-08-19 RX ADMIN — MIDAZOLAM HYDROCHLORIDE 0.5 MG: 1 INJECTION, SOLUTION INTRAMUSCULAR; INTRAVENOUS at 06:41

## 2021-08-19 RX ADMIN — LIDOCAINE HYDROCHLORIDE 50 MG: 20 INJECTION, SOLUTION EPIDURAL; INFILTRATION; INTRACAUDAL; PERINEURAL at 07:08

## 2021-08-19 NOTE — DISCHARGE INSTRUCTIONS
YOUR NEXT PAIN MEDICATION IS DUE AT______________        Moderate Conscious Sedation, Adult, Care After  Refer to this sheet in the next few weeks. These instructions provide you with information on caring for yourself after your procedure. Your health care provider may also give you more specific instructions. Your treatment has been planned according to current medical practices, but problems sometimes occur. Call your health care provider if you have any problems or questions after your procedure.  WHAT TO EXPECT AFTER THE PROCEDURE    After your procedure:  · You may feel sleepy, clumsy, and have poor balance for several hours.  · Vomiting may occur if you eat too soon after the procedure.  HOME CARE INSTRUCTIONS  · Do not participate in any activities where you could become injured for at least 24 hours. Do not:  ¨ Drive.  ¨ Swim.  ¨ Ride a bicycle.  ¨ Operate heavy machinery.  ¨ Cook.  ¨ Use power tools.  ¨ Climb ladders.  ¨ Work from a high place.  · Do not make important decisions or sign legal documents until you are improved.  · If you vomit, drink water, juice, or soup when you can drink without vomiting. Make sure you have little or no nausea before eating solid foods.  · Only take over-the-counter or prescription medicines for pain, discomfort, or fever as directed by your health care provider.  · Make sure you and your family fully understand everything about the medicines given to you, including what side effects may occur.  · You should not drink alcohol, take sleeping pills, or take medicines that cause drowsiness for at least 24 hours.  · If you smoke, do not smoke without supervision.  · If you are feeling better, you may resume normal activities 24 hours after you were sedated.  · Keep all appointments with your health care provider.  SEEK MEDICAL CARE IF:  · Your skin is pale or bluish in color.  · You continue to feel nauseous or vomit.  · Your pain is getting worse and is not helped by  medicine.  · You have bleeding or swelling.  · You are still sleepy or feeling clumsy after 24 hours.  SEEK IMMEDIATE MEDICAL CARE IF:  · You develop a rash.  · You have difficulty breathing.  · You develop any type of allergic problem.  · You have a fever.  MAKE SURE YOU:  · Understand these instructions.  · Will watch your condition.  · Will get help right away if you are not doing well or get worse.     This information is not intended to replace advice given to you by your health care provider. Make sure you discuss any questions you have with your health care provider.     Document Released: 10/08/2014 Document Revised: 01/08/2016 Document Reviewed: 10/08/2014  [employer] Interactive Patient Education ©2016 [employer]         CALL YOUR PHYSICIAN IF YOU EXPERIENCE  INCREASED PAIN NOT HELPED BY YOUR PAIN MEDICATION.        Fall Prevention in the Home      Falls can cause injuries. They can happen to people of all ages. There are many things you can do to make your home safe and to help prevent falls.    WHAT CAN I DO ON THE OUTSIDE OF MY HOME?  · Regularly fix the edges of walkways and driveways and fix any cracks.  · Remove anything that might make you trip as you walk through a door, such as a raised step or threshold.  · Trim any bushes or trees on the path to your home.  · Use bright outdoor lighting.  · Clear any walking paths of anything that might make someone trip, such as rocks or tools.  · Regularly check to see if handrails are loose or broken. Make sure that both sides of any steps have handrails.  · Any raised decks and porches should have guardrails on the edges.  · Have any leaves, snow, or ice cleared regularly.  · Use sand or salt on walking paths during winter.  · Clean up any spills in your garage right away. This includes oil or grease spills.  WHAT CAN I DO IN THE BATHROOM?    · Use night lights.  · Install grab bars by the toilet and in the tub and shower. Do not use towel bars as grab  bars.  · Use non-skid mats or decals in the tub or shower.  · If you need to sit down in the shower, use a plastic, non-slip stool.  · Keep the floor dry. Clean up any water that spills on the floor as soon as it happens.  · Remove soap buildup in the tub or shower regularly.  · Attach bath mats securely with double-sided non-slip rug tape.  · Do not have throw rugs and other things on the floor that can make you trip.  WHAT CAN I DO IN THE BEDROOM?  · Use night lights.  · Make sure that you have a light by your bed that is easy to reach.  · Do not use any sheets or blankets that are too big for your bed. They should not hang down onto the floor.  · Have a firm chair that has side arms. You can use this for support while you get dressed.  · Do not have throw rugs and other things on the floor that can make you trip.  WHAT CAN I DO IN THE KITCHEN?  · Clean up any spills right away.  · Avoid walking on wet floors.  · Keep items that you use a lot in easy-to-reach places.  · If you need to reach something above you, use a strong step stool that has a grab bar.  · Keep electrical cords out of the way.  · Do not use floor polish or wax that makes floors slippery. If you must use wax, use non-skid floor wax.  · Do not have throw rugs and other things on the floor that can make you trip.  WHAT CAN I DO WITH MY STAIRS?  · Do not leave any items on the stairs.  · Make sure that there are handrails on both sides of the stairs and use them. Fix handrails that are broken or loose. Make sure that handrails are as long as the stairways.  · Check any carpeting to make sure that it is firmly attached to the stairs. Fix any carpet that is loose or worn.  · Avoid having throw rugs at the top or bottom of the stairs. If you do have throw rugs, attach them to the floor with carpet tape.  · Make sure that you have a light switch at the top of the stairs and the bottom of the stairs. If you do not have them, ask someone to add them for  you.  WHAT ELSE CAN I DO TO HELP PREVENT FALLS?  · Wear shoes that:  ¨ Do not have high heels.  ¨ Have rubber bottoms.  ¨ Are comfortable and fit you well.  ¨ Are closed at the toe. Do not wear sandals.  · If you use a stepladder:  ¨ Make sure that it is fully opened. Do not climb a closed stepladder.  ¨ Make sure that both sides of the stepladder are locked into place.  ¨ Ask someone to hold it for you, if possible.  · Clearly andrade and make sure that you can see:  ¨ Any grab bars or handrails.  ¨ First and last steps.  ¨ Where the edge of each step is.  · Use tools that help you move around (mobility aids) if they are needed. These include:  ¨ Canes.  ¨ Walkers.  ¨ Scooters.  ¨ Crutches.  · Turn on the lights when you go into a dark area. Replace any light bulbs as soon as they burn out.  · Set up your furniture so you have a clear path. Avoid moving your furniture around.  · If any of your floors are uneven, fix them.  · If there are any pets around you, be aware of where they are.  · Review your medicines with your doctor. Some medicines can make you feel dizzy. This can increase your chance of falling.  Ask your doctor what other things that you can do to help prevent falls.     This information is not intended to replace advice given to you by your health care provider. Make sure you discuss any questions you have with your health care provider.     Document Released: 10/14/2010 Document Revised: 05/03/2016 Document Reviewed: 01/22/2016  [employer] Interactive Patient Education ©2016 [employer]     What to expect after a Nerve Block  Nerve blocks administered to block pain affect many types of nerves, including those nerves that control movement, pain, and normal sensation.  Following a nerve block, you may notice some bruising at the site where the block was given.  You may experience sensations such as:  numbness of the affected area or limb, tingling, heaviness (that is the limb feels heavy to you),  weakness or inability to move the affected arm or leg, or a feeling as if your arm or leg has “fallen asleep”.    A nerve block can last from 9-18 hours depending on the medications used.  Usually the weakness wears off first followed by the tingling and heaviness.  As the block wears off, you may begin to notice pain; however, this sequence of events may occur in any order.  Typically, you will be able to move your limb before you will feel it.  Once a nerve block begins to wear off, the effects are usually completely gone within 60 minutes.    If you experience continued side effects that you believe are block related for longer than 48 hours, please call your healthcare provider.  Please see block-specific instructions below.    Instructions for any block involving the shoulder or arm  • If you have had any kind of shoulder/arm block, you will go home with your arm in a sling.  Wear the sling until the block has completely worn off.  You may be required to wear it for a longer period of time per your surgeon’s recommendations.  • I you have had a shoulder/arm block; it is a good idea to sleep on a recliner with pillows under your arm.    Note:  If you have severe or prolonged shortness of breath, please seek medical assistance as soon as possible.    Protection of a “blocked” arm (limb)  • After a nerve block, you cannot feel pain, pressure, or extremes of temperature in the affected limb.  And because of this, your blocked limb is at more risk for injury.  For example, it is possible to burn your limb on an extremely hot surface without feeling it.  • When resting, it is important to reposition your limb periodically to avoid prolonged pressure on it.  This may require the use of pillows and padding.  • While sleeping, you should avoid rolling onto the affected limb or putting too much pressure on it.  • If you have a cast or tight dressing, check the color of your fingers of the affected limb.  Call your  surgeon if they look discolored (that is, dusky, dark colored)  • Use caution in cold weather.  Cover your limb appropriately to protect it from the cold.  Pain Management  Your surgeon will give you a prescription for pain medication.  Begin taking this before the nerve block wears off.  Bear in mind that sometimes the block can wear off in the middle of the night.PATIENT/FAMILY/RESPONSIBLE PARTY VERBALIZES UNDERSTANDING OF ABOVE EDUCATION.  COPY OF PAIN SCALE GIVEN AND REVIEWED WITH VERBALIZED UNDERSTANDING.

## 2021-08-19 NOTE — ANESTHESIA POSTPROCEDURE EVALUATION
"Patient: Edel Wilson    Procedure Summary     Date: 08/19/21 Room / Location: [facility] PREOP PHASE II    Anesthesia Start: 0658 Anesthesia Stop: 0714    Procedure: MANIPULATION OF JOINT Diagnosis:     Scheduled Providers:  Provider: Onel Mata CRNA    Anesthesia Type: MAC ASA Status: 2          Anesthesia Type: MAC    Vitals  Vitals Value Taken Time   /70 08/19/21 0801   Temp 96.9 °F (36.1 °C) 08/19/21 0720   Pulse 72 08/19/21 0811   Resp 16 08/19/21 0800   SpO2 99 % 08/19/21 0811   Vitals shown include unvalidated device data.        Post Anesthesia Care and Evaluation    Patient location during evaluation: PACU  Patient participation: complete - patient participated  Level of consciousness: awake and alert  Pain management: adequate  Airway patency: patent  Anesthetic complications: No anesthetic complications    Cardiovascular status: acceptable  Respiratory status: acceptable  Hydration status: acceptable    Comments: Blood pressure 141/70, pulse 72, temperature 96.9 °F (36.1 °C), temperature source Temporal, resp. rate 16, height 170 cm (66.93\"), weight 57.4 kg (126 lb 8.7 oz), SpO2 99 %, not currently breastfeeding.    Pt discharged from PACU based on danyelle score >8      "

## 2021-08-19 NOTE — ANESTHESIA PROCEDURE NOTES
Peripheral Block      Patient reassessed immediately prior to procedure    Patient location during procedure: holding area  Start time: 8/19/2021 6:42 AM  Stop time: 8/19/2021 6:44 AM  Reason for block: procedure for pain, at surgeon's request, post-op pain management and Request by Dr. jurado  Performed by  Anesthesiologist: Mika Levine MD  Preanesthetic Checklist  Completed: patient identified, IV checked, site marked, risks and benefits discussed, surgical consent, monitors and equipment checked, pre-op evaluation and timeout performed  Prep:  Pt Position: supine  Sterile barriers:gloves  Prep: ChloraPrep  Patient monitoring: blood pressure monitoring, continuous pulse oximetry and EKG  Procedure  Sedation:yes    Guidance:ultrasound guided, nerve stimulator and Brachial plexus identified and local anesthetic seen surrounding nerves  Images:still images obtained, printed/placed on chart (picture printed and placed in patients chart)  Loss of twitch: 0.5 mA  Laterality:left  Block Type:interscalene  Injection Technique:single-shot  Needle Type:echogenic  Needle Gauge:22 G      Medications Used: ropivacaine (NAROPIN) injection 0.5 %, 20 mL  Med admintered at 8/19/2021 6:46 AM      Post Assessment  Injection Assessment: negative aspiration for heme, no paresthesia on injection and incremental injection  Patient Tolerance:comfortable throughout block  Complications:no

## 2021-08-19 NOTE — ANESTHESIA PREPROCEDURE EVALUATION
Anesthesia Evaluation     Patient summary reviewed   no history of anesthetic complications:  NPO Solid Status: > 8 hours             Airway   Mallampati: II  Dental      Pulmonary    (+) a smoker,   (-) COPD, asthma, sleep apnea  Cardiovascular   Exercise tolerance: excellent (>7 METS)    (+) hypertension,   (-) pacemaker, past MI, angina, cardiac stents      Neuro/Psych  (-) seizures, TIA, CVA  GI/Hepatic/Renal/Endo    (-) GERD, liver disease, no renal disease, diabetes    Musculoskeletal     Abdominal    Substance History      OB/GYN          Other                        Anesthesia Plan    ASA 2     MAC       Anesthetic plan, all risks, benefits, and alternatives have been provided, discussed and informed consent has been obtained with: patient.

## 2021-08-19 NOTE — H&P
Orthopedic History and Physical    Pt Name: Edel Wilson  MRN: [id number]  YOB: 1955  Date: 8/19/2021      HPI: 66-year-old female presents today for a left shoulder mutilation under anesthesia for adhesive capsulitis.      Past Medical/Surgical History:   Past Medical History:   Diagnosis Date   • Anxiety    • Arthritis    • Bursitis and tendinitis of shoulder region     Left;  w/ small, partial tear to tendon.   • Cancer (CMS/HCC)     skin   • Depression    • HYATT (dyspnea on exertion)    • Family history of early CAD    • Full dentures    • Hypertension    • LAFB (left anterior fascicular block)    • RBBB      Past Surgical History:   Procedure Laterality Date   • CHOLECYSTECTOMY  1978   • SKIN CANCER EXCISION  2019   • TUBAL ABDOMINAL LIGATION Bilateral          Social History:   Social History     Socioeconomic History   • Marital status:      Spouse name: Not on file   • Number of children: Not on file   • Years of education: Not on file   • Highest education level: Not on file   Tobacco Use   • Smoking status: Current Some Day Smoker     Packs/day: 0.25     Years: 40.00     Pack years: 10.00     Types: Cigarettes   • Smokeless tobacco: Never Used   • Tobacco comment: occasional; 1-2 cigarette per day   Vaping Use   • Vaping Use: Never used   Substance and Sexual Activity   • Alcohol use: Yes     Alcohol/week: 1.0 - 2.0 standard drinks     Types: 1 - 2 Cans of beer per week     Comment: per patient a night   • Drug use: Never   • Sexual activity: Defer            Medications:   Current Outpatient Medications:   •  alendronate (FOSAMAX) 70 MG tablet, Take 1 tablet by mouth Every 7 (Seven) Days., Disp: 4 tablet, Rfl: 11  •  ALPRAZolam XR (XANAX XR) 0.5 MG 24 hr tablet, Take 1 tablet by mouth Every Morning., Disp: 30 tablet, Rfl: 5  •  Calcium Carbonate (CALCIUM 600 PO), Take 1 tablet by mouth Daily., Disp: , Rfl:   •  Cholecalciferol (vitamin D3) 125 MCG (5000 UT) capsule capsule, Take  5,000 Units by mouth Daily., Disp: , Rfl:   •  etodolac (LODINE) 400 MG tablet, TAKE 1 TABLET BY MOUTH TWICE A DAY, Disp: 60 tablet, Rfl: 5  •  hydroCHLOROthiazide (HYDRODIURIL) 25 MG tablet, Take 1 tablet by mouth Daily., Disp: 90 tablet, Rfl: 3  •  lisinopril (PRINIVIL,ZESTRIL) 20 MG tablet, Take 1 tablet by mouth Daily., Disp: 90 tablet, Rfl: 3  •  Multiple Vitamins-Minerals (MULTIVITAMIN ADULT PO), Take 1 tablet by mouth Daily., Disp: , Rfl:   •  Omega 3 1200 MG capsule, Take 1 tablet by mouth Daily., Disp: , Rfl:   •  vitamin C (ASCORBIC ACID) 500 MG tablet, Take 500 mg by mouth Daily., Disp: , Rfl:   •  VITAMIN E PO, Take 1 capsule by mouth Daily., Disp: , Rfl:   •  Zinc 50 MG tablet, Take 1 tablet by mouth Daily., Disp: , Rfl:   •  methylPREDNISolone (MEDROL) 4 MG dose pack, Take as directed on package instructions., Disp: 1 each, Rfl: 0  •  prednisoLONE acetate (PRED FORTE) 1 % ophthalmic suspension, 1 drop As Needed., Disp: , Rfl:     Current Facility-Administered Medications:   •  dextrose (D50W) 25 g/ 50mL Intravenous Solution 12.5 g, 12.5 g, Intravenous, PRN, Mika Levine MD  •  fentaNYL citrate (PF) (SUBLIMAZE) injection 25 mcg, 25 mcg, Intravenous, Q5 Min PRN, Mika Levine MD  •  lactated ringers infusion 1,000 mL, 1,000 mL, Intravenous, Continuous, Nolan Lawson MD, Last Rate: 25 mL/hr at 08/19/21 0658, Restarted at 08/19/21 0659  •  lactated ringers infusion, 9 mL/hr, Intravenous, Continuous, Mika Levine MD  •  lidocaine PF 1% (XYLOCAINE) injection 0.5 mL, 0.5 mL, Intradermal, Once PRN, Nolan Lawson MD  •  lidocaine PF 1% (XYLOCAINE) injection 0.5 mL, 0.5 mL, Injection, Once PRN, Mika Levine MD  •  midazolam (VERSED) injection 0.5 mg, 0.5 mg, Intravenous, Q10 Min PRN, Mika Levine MD, 0.5 mg at 08/19/21 0641  •  sodium chloride 0.9 % flush 10 mL, 10 mL, Intravenous, Q12H, Mika Levine MD  •  sodium chloride 0.9 %  flush 10 mL, 10 mL, Intravenous, PRN, Mika Levine MD  •  sodium chloride 0.9 % flush 3 mL, 3 mL, Intravenous, PRN, Nolan Lawson MD    Allergies: No Known Allergies       Review of Systems   Constitutional: Negative.    HENT: Negative.    Eyes: Negative.    Respiratory: Negative.    Cardiovascular: Negative.    Gastrointestinal: Negative.    Genitourinary: Negative.    Skin: Negative.    Allergic/Immunologic: Negative.    Neurological: Negative.    Hematological: Negative.    Psychiatric/Behavioral: Negative.           Physical Exam  Constitutional:       Appearance: She is well-developed.   HENT:      Head: Normocephalic and atraumatic.   Eyes:      Conjunctiva/sclera: Conjunctivae normal.   Pulmonary:      Effort: Pulmonary effort is normal.      Breath sounds: Normal breath sounds.   Abdominal:      Palpations: Abdomen is soft.   Musculoskeletal:         General: Tenderness present.      Cervical back: Normal range of motion and neck supple.   Skin:     General: Skin is warm and dry.   Neurological:      Mental Status: She is alert and oriented to person, place, and time.   Psychiatric:         Behavior: Behavior normal.         Thought Content: Thought content normal.         Judgment: Judgment normal.         Ortho Exam:  Left shoulder decreased active and passive range of motion with crepitus and pain.      Imaging:  Imaging Results (Last 72 Hours)     ** No results found for the last 72 hours. **          Labs:   Lab Results (last 24 hours)     ** No results found for the last 24 hours. **          Impression: Left shoulder adhesive capsulitis      Surgical Plan: Left shoulder may place under anesthesia with intra-articular corticosteroid injection      Electronically signed by Nolan Lawson MD on 8/19/2021 at 07:26 CDT

## 2021-08-19 NOTE — OP NOTE
Operative Report    Pt Name: Edel Wilson  MRN: [id number]  YOB: 1955  Date: 8/19/2021         PREOPERATIVE DIAGNOSIS: Left shoulder adhesive capsulitis.       POSTOPERATIVE DIAGNOSIS: Left shoulder adhesive capsulitis.       PROCEDURES PERFORMED:  1. Left shoulder manipulation under anesthesia.    2. Left shoulder intra-articular corticosteroid injection of 80 mg of Depo-Medrol and 8 mL of 0.2% Naropin.       SURGEON: Nolan Lawson MD     ASSISTANT: None.     ANESTHESIA: Monitored anesthesia care.       ESTIMATED BLOOD LOSS: None.     COMPLICATIONS: None.       CONDITION: Stable.       INDICATIONS: This is a 66-year-old female who presented to outpatient clinic with complaints of long-standing shoulder pain. Patient noticed a decreased range of motion with difficulty elevating the affected arm overhead. On exam, the patient demonstrated classic signs of adhesive capsulitis with a decreased passive range of motion. Based on this, and based on the fact that the patient was failing to progress with oral anti-inflammatories, activity modification, as well as exercises, the decision was made to take the patient to the operating room for the above-mentioned procedure.       DESCRIPTION OF PROCEDURE: The patient was interviewed in the preanesthesia area, where the operative extremity was marked with a marking pen. The patient was then administered monitored anesthesia care per the anesthesia team. Timeout was then called to confirm the patient, the operative site, as well as the planned procedure.       A gentle manipulation was then performed. The patient's arm was gently forward flexed to 175°. There was a mild to moderate amount of crepitus present with a supple release. With the arm in 90° of abduction, the arm was then externally rotated to approximately 95° to 100°. With pressure over the anterior aspect of the shoulder, the arm was then internally rotated to 75°. The arm was then adducted across the  body it. It was then placed at the patient's side and externally rotated to 45°. The arm was then circumducted several times until it had a full arc of motion. All of these maneuvers were repeated multiple times until the patient had a supple release with a near full range of motion.       The area just lateral to the coracoid process was then prepped with an alcohol prep. A 22-gauge needle was introduced into the glenohumeral joint and back pressure was placed across the needle to confirm intra-articular placement. Then 80 mg Depo-Medrol with 8 mL of 0.2% Naropin was then administered. The patient's arm was then remanipulated. At the completion of manipulation, the patient awoke without difficulty and was transferred home in stable condition.             cc:           Nolan Lawson M.D.

## 2021-08-23 RX ORDER — ALENDRONATE SODIUM 70 MG/1
70 TABLET ORAL
Qty: 12 TABLET | Refills: 3 | Status: SHIPPED | OUTPATIENT
Start: 2021-08-23 | End: 2022-06-27 | Stop reason: SDUPTHER

## 2021-09-22 RX ORDER — ETODOLAC 400 MG/1
TABLET, FILM COATED ORAL
Qty: 60 TABLET | Refills: 5 | Status: SHIPPED | OUTPATIENT
Start: 2021-09-22 | End: 2022-03-04

## 2021-10-13 PROBLEM — I44.4 LAFB (LEFT ANTERIOR FASCICULAR BLOCK): Chronic | Status: ACTIVE | Noted: 2020-09-02

## 2021-10-13 PROBLEM — I45.10 RBBB: Chronic | Status: ACTIVE | Noted: 2020-09-02

## 2021-10-13 PROBLEM — I10 ESSENTIAL HYPERTENSION: Chronic | Status: ACTIVE | Noted: 2020-09-02

## 2021-10-13 NOTE — PATIENT INSTRUCTIONS
Steps to Quit Smoking  Smoking tobacco is the leading cause of preventable death. It can affect almost every organ in the body. Smoking puts you and those around you at risk for developing many serious chronic diseases. Quitting smoking can be difficult, but it is one of the best things that you can do for your health. It is never too late to quit.  How do I get ready to quit?  When you decide to quit smoking, create a plan to help you succeed. Before you quit:  · Pick a date to quit. Set a date within the next 2 weeks to give you time to prepare.  · Write down the reasons why you are quitting. Keep this list in places where you will see it often.  · Tell your family, friends, and co-workers that you are quitting. Support from your loved ones can make quitting easier.  · Talk with your health care provider about your options for quitting smoking.  · Find out what treatment options are covered by your health insurance.  · Identify people, places, things, and activities that make you want to smoke (triggers). Avoid them.  What first steps can I take to quit smoking?  · Throw away all cigarettes at home, at work, and in your car.  · Throw away smoking accessories, such as ashtrays and lighters.  · Clean your car. Make sure to empty the ashtray.  · Clean your home, including curtains and carpets.  What strategies can I use to quit smoking?  Talk with your health care provider about combining strategies, such as taking medicines while you are also receiving in-person counseling. Using these two strategies together makes you more likely to succeed in quitting than if you used either strategy on its own.  · If you are pregnant or breastfeeding, talk with your health care provider about finding counseling or other support strategies to quit smoking. Do not take medicine to help you quit smoking unless your health care provider tells you to do so.  To quit smoking:  Quit right away  · Quit smoking completely, instead of  gradually reducing how much you smoke over a period of time. Research shows that stopping smoking right away is more successful than gradually quitting.  · Attend in-person counseling to help you build problem-solving skills. You are more likely to succeed in quitting if you attend counseling sessions regularly. Even short sessions of 10 minutes can be effective.  Take medicine  You may take medicines to help you quit smoking. Some medicines require a prescription and some you can purchase over-the-counter. Medicines may have nicotine in them to replace the nicotine in cigarettes. Medicines may:  · Help to stop cravings.  · Help to relieve withdrawal symptoms.  Your health care provider may recommend:  · Nicotine patches, gum, or lozenges.  · Nicotine inhalers or sprays.  · Non-nicotine medicine that is taken by mouth.  Find resources  Find resources and support systems that can help you to quit smoking and remain smoke-free after you quit. These resources are most helpful when you use them often. They include:  · Online chats with a counselor.  · Telephone quitlines.  · Printed self-help materials.  · Support groups or group counseling.  · Text messaging programs.  · Mobile phone apps or applications. Use apps that can help you stick to your quit plan by providing reminders, tips, and encouragement. There are many free apps for mobile devices as well as websites. Examples include Quit Guide from the CDC and smokefree.gov  What things can I do to make it easier to quit?    · Reach out to your family and friends for support and encouragement. Call telephone quitlines (2-155-QUIT-NOW), reach out to support groups, or work with a counselor for support.  · Ask people who smoke to avoid smoking around you.  · Avoid places that trigger you to smoke, such as bars, parties, or smoke-break areas at work.  · Spend time with people who do not smoke.  · Lessen the stress in your life. Stress can be a smoking trigger for some  people. To lessen stress, try:  ? Exercising regularly.  ? Doing deep-breathing exercises.  ? Doing yoga.  ? Meditating.  ? Performing a body scan. This involves closing your eyes, scanning your body from head to toe, and noticing which parts of your body are particularly tense. Try to relax the muscles in those areas.  How will I feel when I quit smoking?  Day 1 to 3 weeks  Within the first 24 hours of quitting smoking, you may start to feel withdrawal symptoms. These symptoms are usually most noticeable 2-3 days after quitting, but they usually do not last for more than 2-3 weeks. You may experience these symptoms:  · Mood swings.  · Restlessness, anxiety, or irritability.  · Trouble concentrating.  · Dizziness.  · Strong cravings for sugary foods and nicotine.  · Mild weight gain.  · Constipation.  · Nausea.  · Coughing or a sore throat.  · Changes in how the medicines that you take for unrelated issues work in your body.  · Depression.  · Trouble sleeping (insomnia).  Week 3 and afterward  After the first 2-3 weeks of quitting, you may start to notice more positive results, such as:  · Improved sense of smell and taste.  · Decreased coughing and sore throat.  · Slower heart rate.  · Lower blood pressure.  · Clearer skin.  · The ability to breathe more easily.  · Fewer sick days.  Quitting smoking can be very challenging. Do not get discouraged if you are not successful the first time. Some people need to make many attempts to quit before they achieve long-term success. Do your best to stick to your quit plan, and talk with your health care provider if you have any questions or concerns.  Summary  · Smoking tobacco is the leading cause of preventable death. Quitting smoking is one of the best things that you can do for your health.  · When you decide to quit smoking, create a plan to help you succeed.  · Quit smoking right away, not slowly over a period of time.  · When you start quitting, seek help from your  health care provider, family, or friends.  This information is not intended to replace advice given to you by your health care provider. Make sure you discuss any questions you have with your health care provider.  Document Revised: 09/11/2020 Document Reviewed: 03/07/2020  [employer] Patient Education © 2021 [employer]

## 2021-10-13 NOTE — PROGRESS NOTES
Subjective:     Encounter Date:10/14/2021      Patient ID: Edel Wilson is a 66 y.o. female   HPI: This patient presents today for routine follow-up. She has hypertension, left anterior fascicular block, right bundle branch block, anxiety, depression, skin cancer and tobacco use. She reports some shortness of breath in the mornings when first getting out of bed. Patient denies chest pain, palpitations, dizziness, syncope, orthopnea, PND, edema or decreased stamina.  Patient denies any signs of bleeding.    Chief Complaint: Routine follow-up  Hypertension  This is a chronic problem. The current episode started more than 1 year ago. The problem is controlled. Associated symptoms include shortness of breath. Pertinent negatives include no anxiety, blurred vision, chest pain, headaches, malaise/fatigue, neck pain, orthopnea, palpitations, peripheral edema, PND or sweats. Risk factors for coronary artery disease include post-menopausal state. Current antihypertension treatment includes ACE inhibitors and diuretics. The current treatment provides significant improvement.       Previous Cardiac Testing:  Results for orders placed during the hospital encounter of 09/25/20    Adult Transthoracic Echo Complete W/ Cont if Necessary Per Protocol    Interpretation Summary  · Left ventricular ejection fraction appears to be 61 - 65%. Left ventricular systolic function is normal.  · Left ventricular diastolic function was normal  · Estimated right ventricular systolic pressure from tricuspid regurgitation is normal (<35 mmHg).        The following portions of the patient's history were reviewed and updated as appropriate: allergies, current medications, past family history, past medical history, past social history, past surgical history and problem list.    No Known Allergies    Current Outpatient Medications:   •  alendronate (FOSAMAX) 70 MG tablet, TAKE 1 TABLET BY MOUTH EVERY 7 (SEVEN) DAYS., Disp: 12 tablet, Rfl: 3  •   ALPRAZolam XR (XANAX XR) 0.5 MG 24 hr tablet, Take 1 tablet by mouth Every Morning., Disp: 30 tablet, Rfl: 5  •  Calcium Carbonate (CALCIUM 600 PO), Take 1 tablet by mouth Daily., Disp: , Rfl:   •  Cholecalciferol (vitamin D3) 125 MCG (5000 UT) capsule capsule, Take 5,000 Units by mouth Daily., Disp: , Rfl:   •  etodolac (LODINE) 400 MG tablet, TAKE 1 TABLET BY MOUTH TWICE A DAY, Disp: 60 tablet, Rfl: 5  •  hydroCHLOROthiazide (HYDRODIURIL) 25 MG tablet, Take 1 tablet by mouth Daily., Disp: 90 tablet, Rfl: 3  •  HYDROcodone-acetaminophen (NORCO)  MG per tablet, Take 1 tablet by mouth Every 4 (Four) Hours As Needed for Moderate Pain ., Disp: 28 tablet, Rfl: 0  •  lisinopril (PRINIVIL,ZESTRIL) 20 MG tablet, Take 1 tablet by mouth Daily., Disp: 90 tablet, Rfl: 3  •  Multiple Vitamins-Minerals (MULTIVITAMIN ADULT PO), Take 1 tablet by mouth Daily., Disp: , Rfl:   •  Omega 3 1200 MG capsule, Take 1 tablet by mouth Daily., Disp: , Rfl:   •  prednisoLONE acetate (PRED FORTE) 1 % ophthalmic suspension, 1 drop As Needed., Disp: , Rfl:   •  vitamin C (ASCORBIC ACID) 500 MG tablet, Take 500 mg by mouth Daily., Disp: , Rfl:   •  VITAMIN E PO, Take 1 capsule by mouth Daily., Disp: , Rfl:   •  Zinc 50 MG tablet, Take 1 tablet by mouth Daily., Disp: , Rfl:   Past Medical History:   Diagnosis Date   • Anxiety    • Arthritis    • Bursitis and tendinitis of shoulder region     Left;  w/ small, partial tear to tendon.   • Cancer (HCC)     skin   • Depression    • HYATT (dyspnea on exertion)    • Family history of early CAD    • Full dentures    • Hypertension    • LAFB (left anterior fascicular block)    • RBBB      Past Surgical History:   Procedure Laterality Date   • CHOLECYSTECTOMY  1978   • SKIN CANCER EXCISION  2019   • TUBAL ABDOMINAL LIGATION Bilateral      Family History   Problem Relation Age of Onset   • Arthritis Mother    • Hypertension Mother    • Depression Mother    • Cancer Maternal Grandmother    • Arthritis  Paternal Grandmother    • Cancer Paternal Grandmother    • Cancer Other      Social History     Socioeconomic History   • Marital status:    Tobacco Use   • Smoking status: Current Some Day Smoker     Packs/day: 0.25     Years: 40.00     Pack years: 10.00     Types: Cigarettes   • Smokeless tobacco: Never Used   • Tobacco comment: occasional; 1-2 cigarette per day   Vaping Use   • Vaping Use: Never used   Substance and Sexual Activity   • Alcohol use: Yes     Alcohol/week: 1.0 - 2.0 standard drink     Types: 1 - 2 Cans of beer per week     Comment: per patient a night   • Drug use: Never   • Sexual activity: Defer       Review of Systems   Constitutional: Negative for chills, decreased appetite, fever, malaise/fatigue, night sweats, weight gain and weight loss.   HENT: Negative for nosebleeds.    Eyes: Negative for blurred vision and visual disturbance.   Cardiovascular: Negative for chest pain, dyspnea on exertion, leg swelling, near-syncope, orthopnea, palpitations, paroxysmal nocturnal dyspnea and syncope.   Respiratory: Positive for shortness of breath. Negative for cough, hemoptysis, snoring and wheezing.    Endocrine: Negative for cold intolerance and heat intolerance.   Hematologic/Lymphatic: Does not bruise/bleed easily.   Skin: Negative for rash.   Musculoskeletal: Negative for back pain, falls and neck pain.   Gastrointestinal: Negative for abdominal pain, change in bowel habit, constipation, diarrhea, dysphagia, heartburn, nausea and vomiting.   Genitourinary: Negative for hematuria.   Neurological: Negative for dizziness, headaches, light-headedness and weakness.   Psychiatric/Behavioral: Negative for altered mental status.   Allergic/Immunologic: Negative for persistent infections.              Objective:     Vitals and nursing note reviewed.   Constitutional:       General: Not in acute distress.     Appearance: Normal and healthy appearance. Well-developed, normal weight and not in distress.  Not diaphoretic.   Eyes:      General: Lids are normal.         Right eye: No discharge.         Left eye: No discharge.      Conjunctiva/sclera: Conjunctivae normal.      Pupils: Pupils are equal, round, and reactive to light.   HENT:      Head: Normocephalic and atraumatic.      Jaw: There is normal jaw occlusion.      Right Ear: External ear normal.      Left Ear: External ear normal.      Nose: Nose normal.   Neck:      Thyroid: No thyromegaly.      Vascular: No carotid bruit, JVD or JVR. JVD normal.      Trachea: Trachea normal. No tracheal deviation.   Pulmonary:      Effort: Pulmonary effort is normal. No respiratory distress.      Breath sounds: Examination of the right-upper field reveals decreased breath sounds. Examination of the left-upper field reveals decreased breath sounds. Examination of the right-middle field reveals decreased breath sounds. Examination of the left-middle field reveals decreased breath sounds. Examination of the right-lower field reveals decreased breath sounds. Examination of the left-lower field reveals decreased breath sounds. Decreased breath sounds present. No wheezing. No rhonchi. No rales.   Chest:      Chest wall: Not tender to palpatation.   Cardiovascular:      PMI at left midclavicular line. Normal rate. Regular rhythm. Normal S1. Normal S2.      Murmurs: There is no murmur.      No gallop. No click. No rub.   Pulses:     Intact distal pulses. No decreased pulses.   Edema:     Peripheral edema absent.   Abdominal:      General: Bowel sounds are normal. There is no distension.      Palpations: Abdomen is soft.      Tenderness: There is no abdominal tenderness.   Musculoskeletal: Normal range of motion.         General: No tenderness or deformity.      Cervical back: Normal range of motion and neck supple. Skin:     General: Skin is warm and dry.      Coloration: Skin is not pale.      Findings: No erythema or rash.   Neurological:      General: No focal deficit present.  "     Mental Status: Alert, oriented to person, place, and time and oriented to person, place and time.   Psychiatric:         Attention and Perception: Attention and perception normal.         Mood and Affect: Mood and affect normal.         Speech: Speech normal.         Behavior: Behavior normal.         Thought Content: Thought content normal.         Cognition and Memory: Cognition and memory normal.         Judgment: Judgment normal.             ECG 12 Lead    Date/Time: 10/14/2021 10:01 AM  Performed by: Jessica Magana APRN  Authorized by: Jessica Magana APRN   Comparison: compared with previous ECG from 4/14/2021  Similar to previous ECG  Rhythm: sinus rhythm  Rate: normal  Conduction: right bundle branch block and left anterior fascicular block    Clinical impression: abnormal EKG          /74   Pulse 72   Ht 172.7 cm (68\")   Wt 57.6 kg (127 lb)   LMP  (LMP Unknown)   SpO2 98%   BMI 19.31 kg/m²   Lab Review:   Lab Results   Component Value Date    WBC 5.70 08/12/2020    HGB 14.5 08/12/2020    HCT 41.9 08/12/2020    MCV 93.7 08/12/2020     08/12/2020     Lab Results   Component Value Date    BUN 32 (H) 05/24/2021    CREATININE 0.97 05/24/2021    EGFRIFNONA 57 (L) 05/24/2021    EGFRIFAFRI 70 05/24/2021    BCR 33.0 (H) 05/24/2021    K 4.8 05/24/2021    CO2 27.2 05/24/2021    CALCIUM 9.3 05/24/2021    PROTENTOTREF 6.0 08/12/2020    ALBUMIN 4.40 08/12/2020    LABIL2 2.8 08/12/2020    AST 18 08/12/2020    ALT 16 08/12/2020     Lab Results   Component Value Date    CHLPL 200 08/12/2020     Lab Results   Component Value Date    TRIG 43 08/12/2020     Lab Results   Component Value Date     (H) 08/12/2020     Lab Results   Component Value Date    LDL 76 08/12/2020       I have reviewed the most recent lab results.       Assessment:          Diagnosis Plan   1. Essential hypertension  Well controlled.    2. LAFB (left anterior fascicular block)  Chronic. Stable.    3. RBBB  Chronic. " Stable.    4. Anxiety and depression  Management per PCP.    5. Smoker  Edel Wilson  reports that she has been smoking cigarettes. She has a 10.00 pack-year smoking history. She has never used smokeless tobacco.. I have educated her on the risk of diseases from using tobacco products such as cancer, COPD and heart disease.     I advised her to quit and she is not willing to quit.    I spent 3  minutes counseling the patient.                 Plan:         1. Continue medications as previously prescribed.  2. Report any worsening symptoms.  3. Report any signs of bleeding.  4. Continue heart healthy diet and regular exercise as tolerated.   5. Follow up with PCP for blood pressure and cholesterol management and routine lab work.  6. Follow up with Dr. Lino in six months, or sooner if needed.

## 2021-10-14 ENCOUNTER — OFFICE VISIT (OUTPATIENT)
Dept: CARDIOLOGY | Facility: CLINIC | Age: 66
End: 2021-10-14

## 2021-10-14 VITALS
OXYGEN SATURATION: 98 % | SYSTOLIC BLOOD PRESSURE: 122 MMHG | BODY MASS INDEX: 19.25 KG/M2 | WEIGHT: 127 LBS | HEART RATE: 72 BPM | DIASTOLIC BLOOD PRESSURE: 74 MMHG | HEIGHT: 68 IN

## 2021-10-14 DIAGNOSIS — F32.A ANXIETY AND DEPRESSION: ICD-10-CM

## 2021-10-14 DIAGNOSIS — I10 ESSENTIAL HYPERTENSION: Primary | Chronic | ICD-10-CM

## 2021-10-14 DIAGNOSIS — I45.10 RBBB: Chronic | ICD-10-CM

## 2021-10-14 DIAGNOSIS — F17.200 SMOKER: ICD-10-CM

## 2021-10-14 DIAGNOSIS — F41.9 ANXIETY AND DEPRESSION: ICD-10-CM

## 2021-10-14 DIAGNOSIS — I44.4 LAFB (LEFT ANTERIOR FASCICULAR BLOCK): Chronic | ICD-10-CM

## 2021-10-14 PROCEDURE — 93000 ELECTROCARDIOGRAM COMPLETE: CPT | Performed by: NURSE PRACTITIONER

## 2021-10-14 PROCEDURE — 99213 OFFICE O/P EST LOW 20 MIN: CPT | Performed by: NURSE PRACTITIONER

## 2021-11-24 ENCOUNTER — LAB (OUTPATIENT)
Dept: INTERNAL MEDICINE | Facility: CLINIC | Age: 66
End: 2021-11-24

## 2021-11-24 DIAGNOSIS — M81.0 OSTEOPOROSIS, UNSPECIFIED OSTEOPOROSIS TYPE, UNSPECIFIED PATHOLOGICAL FRACTURE PRESENCE: ICD-10-CM

## 2021-11-24 DIAGNOSIS — I10 ESSENTIAL HYPERTENSION: ICD-10-CM

## 2021-11-24 DIAGNOSIS — Z79.899 ENCOUNTER FOR LONG-TERM CURRENT USE OF MEDICATION: ICD-10-CM

## 2021-11-25 LAB
25(OH)D3+25(OH)D2 SERPL-MCNC: 78.9 NG/ML (ref 30–100)
ALBUMIN SERPL-MCNC: 4.5 G/DL (ref 3.5–5.2)
ALBUMIN/GLOB SERPL: 2 G/DL
ALP SERPL-CCNC: 62 U/L (ref 39–117)
ALT SERPL-CCNC: 17 U/L (ref 1–33)
APPEARANCE UR: CLEAR
AST SERPL-CCNC: 21 U/L (ref 1–32)
BACTERIA #/AREA URNS HPF: ABNORMAL /HPF
BASOPHILS # BLD AUTO: 0.05 10*3/MM3 (ref 0–0.2)
BASOPHILS NFR BLD AUTO: 0.8 % (ref 0–1.5)
BILIRUB SERPL-MCNC: 0.6 MG/DL (ref 0–1.2)
BILIRUB UR QL STRIP: NEGATIVE
BUN SERPL-MCNC: 23 MG/DL (ref 8–23)
BUN/CREAT SERPL: 23.5 (ref 7–25)
CALCIUM SERPL-MCNC: 9.6 MG/DL (ref 8.6–10.5)
CASTS URNS MICRO: ABNORMAL
CHLORIDE SERPL-SCNC: 103 MMOL/L (ref 98–107)
CHOLEST SERPL-MCNC: 212 MG/DL (ref 0–200)
CO2 SERPL-SCNC: 30.4 MMOL/L (ref 22–29)
COLOR UR: ABNORMAL
CREAT SERPL-MCNC: 0.98 MG/DL (ref 0.57–1)
EOSINOPHIL # BLD AUTO: 0.26 10*3/MM3 (ref 0–0.4)
EOSINOPHIL NFR BLD AUTO: 4.4 % (ref 0.3–6.2)
EPI CELLS #/AREA URNS HPF: ABNORMAL /HPF
ERYTHROCYTE [DISTWIDTH] IN BLOOD BY AUTOMATED COUNT: 11.9 % (ref 12.3–15.4)
GLOBULIN SER CALC-MCNC: 2.2 GM/DL
GLUCOSE SERPL-MCNC: 97 MG/DL (ref 65–99)
GLUCOSE UR QL: NEGATIVE
HCT VFR BLD AUTO: 42 % (ref 34–46.6)
HDLC SERPL-MCNC: 109 MG/DL (ref 40–60)
HGB BLD-MCNC: 14.4 G/DL (ref 12–15.9)
HGB UR QL STRIP: NEGATIVE
IMM GRANULOCYTES # BLD AUTO: 0.03 10*3/MM3 (ref 0–0.05)
IMM GRANULOCYTES NFR BLD AUTO: 0.5 % (ref 0–0.5)
KETONES UR QL STRIP: ABNORMAL
LDLC SERPL CALC-MCNC: 91 MG/DL (ref 0–100)
LEUKOCYTE ESTERASE UR QL STRIP: NEGATIVE
LYMPHOCYTES # BLD AUTO: 2.01 10*3/MM3 (ref 0.7–3.1)
LYMPHOCYTES NFR BLD AUTO: 34.1 % (ref 19.6–45.3)
MCH RBC QN AUTO: 33.1 PG (ref 26.6–33)
MCHC RBC AUTO-ENTMCNC: 34.3 G/DL (ref 31.5–35.7)
MCV RBC AUTO: 96.6 FL (ref 79–97)
MONOCYTES # BLD AUTO: 0.64 10*3/MM3 (ref 0.1–0.9)
MONOCYTES NFR BLD AUTO: 10.9 % (ref 5–12)
NEUTROPHILS # BLD AUTO: 2.9 10*3/MM3 (ref 1.7–7)
NEUTROPHILS NFR BLD AUTO: 49.3 % (ref 42.7–76)
NITRITE UR QL STRIP: NEGATIVE
NRBC BLD AUTO-RTO: 0 /100 WBC (ref 0–0.2)
PH UR STRIP: 5.5 [PH] (ref 5–8)
PLATELET # BLD AUTO: 331 10*3/MM3 (ref 140–450)
POTASSIUM SERPL-SCNC: 5.1 MMOL/L (ref 3.5–5.2)
PROT SERPL-MCNC: 6.7 G/DL (ref 6–8.5)
PROT UR QL STRIP: ABNORMAL
RBC # BLD AUTO: 4.35 10*6/MM3 (ref 3.77–5.28)
RBC #/AREA URNS HPF: ABNORMAL /HPF
SODIUM SERPL-SCNC: 143 MMOL/L (ref 136–145)
SP GR UR: 1.02 (ref 1–1.03)
TRIGL SERPL-MCNC: 65 MG/DL (ref 0–150)
TSH SERPL DL<=0.005 MIU/L-ACNC: 0.79 UIU/ML (ref 0.27–4.2)
URATE SERPL-MCNC: 2.6 MG/DL (ref 2.4–5.7)
UROBILINOGEN UR STRIP-MCNC: ABNORMAL MG/DL
VLDLC SERPL CALC-MCNC: 12 MG/DL (ref 5–40)
WBC # BLD AUTO: 5.89 10*3/MM3 (ref 3.4–10.8)
WBC #/AREA URNS HPF: ABNORMAL /HPF

## 2021-12-03 ENCOUNTER — OFFICE VISIT (OUTPATIENT)
Dept: INTERNAL MEDICINE | Facility: CLINIC | Age: 66
End: 2021-12-03

## 2021-12-03 VITALS
BODY MASS INDEX: 20.4 KG/M2 | DIASTOLIC BLOOD PRESSURE: 72 MMHG | HEIGHT: 67 IN | WEIGHT: 130 LBS | SYSTOLIC BLOOD PRESSURE: 120 MMHG | TEMPERATURE: 97.3 F | OXYGEN SATURATION: 99 % | HEART RATE: 88 BPM

## 2021-12-03 DIAGNOSIS — I10 ESSENTIAL HYPERTENSION: ICD-10-CM

## 2021-12-03 DIAGNOSIS — Z12.31 ENCOUNTER FOR SCREENING MAMMOGRAM FOR MALIGNANT NEOPLASM OF BREAST: ICD-10-CM

## 2021-12-03 DIAGNOSIS — R80.8 OTHER PROTEINURIA: ICD-10-CM

## 2021-12-03 DIAGNOSIS — F17.200 SMOKER: ICD-10-CM

## 2021-12-03 DIAGNOSIS — Z00.00 ANNUAL PHYSICAL EXAM: Primary | ICD-10-CM

## 2021-12-03 DIAGNOSIS — M81.0 OSTEOPOROSIS, UNSPECIFIED OSTEOPOROSIS TYPE, UNSPECIFIED PATHOLOGICAL FRACTURE PRESENCE: ICD-10-CM

## 2021-12-03 LAB
BILIRUB BLD-MCNC: ABNORMAL MG/DL
CLARITY, POC: CLEAR
COLOR UR: YELLOW
GLUCOSE UR STRIP-MCNC: NEGATIVE MG/DL
KETONES UR QL: NEGATIVE
LEUKOCYTE EST, POC: NEGATIVE
NITRITE UR-MCNC: NEGATIVE MG/ML
PH UR: 6 [PH] (ref 5–8)
PROT UR STRIP-MCNC: NEGATIVE MG/DL
RBC # UR STRIP: NEGATIVE /UL
SP GR UR: 1.02 (ref 1–1.03)
UROBILINOGEN UR QL: NORMAL

## 2021-12-03 PROCEDURE — 81003 URINALYSIS AUTO W/O SCOPE: CPT | Performed by: NURSE PRACTITIONER

## 2021-12-03 PROCEDURE — 1126F AMNT PAIN NOTED NONE PRSNT: CPT | Performed by: NURSE PRACTITIONER

## 2021-12-03 PROCEDURE — 1159F MED LIST DOCD IN RCRD: CPT | Performed by: NURSE PRACTITIONER

## 2021-12-03 PROCEDURE — G0008 ADMIN INFLUENZA VIRUS VAC: HCPCS | Performed by: NURSE PRACTITIONER

## 2021-12-03 PROCEDURE — 90686 IIV4 VACC NO PRSV 0.5 ML IM: CPT | Performed by: NURSE PRACTITIONER

## 2021-12-03 PROCEDURE — G0439 PPPS, SUBSEQ VISIT: HCPCS | Performed by: NURSE PRACTITIONER

## 2021-12-03 NOTE — PROGRESS NOTES
QUICK REFERENCE INFORMATION:  The ABCs of the Annual Wellness Visit    Initial Medicare Wellness Visit    Edel presents today for annual physical exam.  She denies any concerns to address today.  BP is stable in the office today.  She denies having trouble with home readings.  Lab work was completed and reviewed.  U/A shows protein and ketones.  Will recheck this prior to leaving office today.   She is due for mammogram and dexa scan.  Colonoscopy due .  She would like to get her Flu vaccine today.     HEALTH RISK ASSESSMENT    : 1955    Recent Hospitalizations:  No hospitalization(s) within the last year..  ccc      Current Medical Providers:  Patient Care Team:  Silvia Servin APRN as PCP - General (Family Medicine)  Giovanni Lino MD as Cardiologist (Cardiology)  Silvia Servin APRN as Referring Physician (Family Medicine)        Smoking Status:  Social History     Tobacco Use   Smoking Status Current Some Day Smoker   • Packs/day: 0.25   • Years: 40.00   • Pack years: 10.00   • Types: Cigarettes   Smokeless Tobacco Never Used   Tobacco Comment    occasional; 1-2 cigarette per day       Alcohol Consumption:  Social History     Substance and Sexual Activity   Alcohol Use Yes   • Alcohol/week: 1.0 - 2.0 standard drink   • Types: 1 - 2 Cans of beer per week    Comment: 2 a night       Depression Screen:   PHQ-2/PHQ-9 Depression Screening 8/10/2020   Little interest or pleasure in doing things 0   Feeling down, depressed, or hopeless 0   Total Score 0       Health Habits and Functional and Cognitive Screening:  No flowsheet data found.    Visual Acuity:  No exam data present    Does the patient have evidence of cognitive impairment? No    Asiprin use counseling: Start ASA 81 mg daily       Recent Lab Results:    Lab Results   Component Value Date     (H) 05/15/2020        Lab Results   Component Value Date    TRIG 65 2021     (H) 2021    VLDL 12 2021            Age-appropriate Screening Schedule:  Refer to the list below for future screening recommendations based on patient's age, sex and/or medical conditions. Orders for these recommended tests are listed in the plan section. The patient has been provided with a written plan.    Health Maintenance   Topic Date Due   • DXA SCAN  11/01/2021   • MAMMOGRAM  12/01/2021   • ZOSTER VACCINE (1 of 2) 01/03/2022 (Originally 7/3/2004)   • TDAP/TD VACCINES (1 - Tdap) 06/03/2022 (Originally 7/3/1973)   • INFLUENZA VACCINE  Completed        Subjective   History of Present Illness    Edel Wilson is a 66 y.o. female who presents for an Annual Wellness Visit.    The following portions of the patient's history were reviewed and updated as appropriate: allergies, current medications, past family history, past medical history, past social history, past surgical history and problem list.    Outpatient Medications Prior to Visit   Medication Sig Dispense Refill   • alendronate (FOSAMAX) 70 MG tablet TAKE 1 TABLET BY MOUTH EVERY 7 (SEVEN) DAYS. 12 tablet 3   • ALPRAZolam XR (XANAX XR) 0.5 MG 24 hr tablet Take 1 tablet by mouth Every Morning. 30 tablet 5   • Calcium Carbonate (CALCIUM 600 PO) Take 1 tablet by mouth Daily.     • Cholecalciferol (vitamin D3) 125 MCG (5000 UT) capsule capsule Take 5,000 Units by mouth Daily.     • etodolac (LODINE) 400 MG tablet TAKE 1 TABLET BY MOUTH TWICE A DAY 60 tablet 5   • hydroCHLOROthiazide (HYDRODIURIL) 25 MG tablet Take 1 tablet by mouth Daily. 90 tablet 3   • lisinopril (PRINIVIL,ZESTRIL) 20 MG tablet Take 1 tablet by mouth Daily. 90 tablet 3   • Multiple Vitamins-Minerals (MULTIVITAMIN ADULT PO) Take 1 tablet by mouth Daily.     • Omega 3 1200 MG capsule Take 1 tablet by mouth Daily.     • prednisoLONE acetate (PRED FORTE) 1 % ophthalmic suspension 1 drop As Needed.     • vitamin C (ASCORBIC ACID) 500 MG tablet Take 500 mg by mouth Daily.     • VITAMIN E PO Take 1 capsule by mouth Daily.      • Zinc 50 MG tablet Take 1 tablet by mouth Daily.     • HYDROcodone-acetaminophen (NORCO)  MG per tablet Take 1 tablet by mouth Every 4 (Four) Hours As Needed for Moderate Pain . 28 tablet 0     No facility-administered medications prior to visit.       Patient Active Problem List   Diagnosis   • Essential hypertension   • Abnormal ECG   • HYATT (dyspnea on exertion)   • RBBB   • LAFB (left anterior fascicular block)   • Anxiety and depression   • Smoker   • Chronic midline low back pain without sciatica   • Family history of early CAD       Advance Care Planning:  ACP discussion was held with the patient during this visit. Patient does not have an advance directive, information provided.    Identification of Risk Factors:  Risk factors include: Advance Directive Discussion  Breast Cancer/Mammogram Screening  Cardiovascular risk  Chronic Pain   Colon Cancer Screening  Dementia/Memory   Depression/Dysphoria  Diabetic Lab Screening   Fall Risk  Immunizations Discussed/Encouraged (specific immunizations; Tdap, Influenza, Pneumococcal 23, Shingrix and COVID19 )  Inadequate Social Support, Isolation, Loneliness, Lack of Transportation, Financial Difficulties, or Caregiver Stress   Inactivity/Sedentary  Osteoporosis Risk  Polypharmacy  Tobacco Use/Dependance (use dotphrase .tobaccocessation for documentation).    Review of Systems    Compared to one year ago, the patient feels her physical health is the same.  Compared to one year ago, the patient feels her mental health is the same.    Objective     Physical Exam  Constitutional:       Appearance: She is well-developed.   HENT:      Head: Normocephalic.      Right Ear: Tympanic membrane and external ear normal.      Left Ear: Tympanic membrane and external ear normal.      Nose: Nose normal.      Mouth/Throat:      Mouth: Mucous membranes are moist. No oral lesions.      Pharynx: Oropharynx is clear.   Eyes:      Conjunctiva/sclera: Conjunctivae normal.       "Pupils: Pupils are equal, round, and reactive to light.   Neck:      Thyroid: No thyromegaly.      Vascular: No carotid bruit.   Cardiovascular:      Rate and Rhythm: Normal rate and regular rhythm.      Pulses: Normal pulses.           Radial pulses are 2+ on the right side and 2+ on the left side.      Heart sounds: Normal heart sounds. No murmur heard.      Pulmonary:      Effort: Pulmonary effort is normal.      Breath sounds: Normal breath sounds.   Chest:          Comments: Firm area related to old cyst.    Abdominal:      General: Bowel sounds are normal.      Palpations: Abdomen is soft.      Tenderness: There is no abdominal tenderness.   Musculoskeletal:      Cervical back: Normal range of motion.      Right lower leg: No edema.      Left lower leg: No edema.   Skin:     General: Skin is warm and dry.   Neurological:      Mental Status: She is alert and oriented to person, place, and time.      Gait: Gait normal.   Psychiatric:         Mood and Affect: Mood normal.         Speech: Speech normal.         Behavior: Behavior normal.         Thought Content: Thought content normal.         Vitals:    12/03/21 1321   BP: 120/72   BP Location: Right arm   Patient Position: Sitting   Cuff Size: Adult   Pulse: 88   Temp: 97.3 °F (36.3 °C)   TempSrc: Temporal   SpO2: 99%   Weight: 59 kg (130 lb)   Height: 170.2 cm (67\")   PainSc: 0-No pain       Patient's Body mass index is 20.36 kg/m². indicating that she is within normal range (BMI 18.5-24.9). No BMI management plan needed..      Procedure   Procedures       Assessment/Plan   Patient Self-Management and Personalized Health Advice  The patient has been provided with information about: diet, exercise, tobacco cessation and fall prevention.    Visit Diagnoses:    ICD-10-CM ICD-9-CM   1. Annual physical exam  Z00.00 V70.0   2. Encounter for screening mammogram for malignant neoplasm of breast  Z12.31 V76.12   3. Osteoporosis, unspecified osteoporosis type, " unspecified pathological fracture presence  M81.0 733.00   4. Essential hypertension  I10 401.9   5. Smoker  F17.200 305.1   6. Other proteinuria  R80.8 791.0       Orders Placed This Encounter   Procedures   • Mammo Screening Digital Tomosynthesis Bilateral With CAD     Standing Status:   Future     Standing Expiration Date:   12/3/2022     Order Specific Question:   Reason for Exam:     Answer:   Screening     Order Specific Question:   Release to patient     Answer:   Immediate   • DEXA Bone Density Axial     Standing Status:   Future     Standing Expiration Date:   12/3/2022     Order Specific Question:   Is patient taking or have taken long term Glucocorticoid (steroids)?     Answer:   No     Order Specific Question:   Does the patient have rheumatoid arthritis?     Answer:   No     Order Specific Question:   Does the patient have secondary osteoporosis?     Answer:   No     Order Specific Question:   Reason for Exam:     Answer:   Screening     Order Specific Question:   Release to patient     Answer:   Immediate   • FluLaval/Fluarix/Fluzone >6 Months   • POC Urinalysis Dipstick, Multipro     Order Specific Question:   Release to patient     Answer:   Immediate       Outpatient Encounter Medications as of 12/3/2021   Medication Sig Dispense Refill   • alendronate (FOSAMAX) 70 MG tablet TAKE 1 TABLET BY MOUTH EVERY 7 (SEVEN) DAYS. 12 tablet 3   • ALPRAZolam XR (XANAX XR) 0.5 MG 24 hr tablet Take 1 tablet by mouth Every Morning. 30 tablet 5   • Calcium Carbonate (CALCIUM 600 PO) Take 1 tablet by mouth Daily.     • Cholecalciferol (vitamin D3) 125 MCG (5000 UT) capsule capsule Take 5,000 Units by mouth Daily.     • etodolac (LODINE) 400 MG tablet TAKE 1 TABLET BY MOUTH TWICE A DAY 60 tablet 5   • hydroCHLOROthiazide (HYDRODIURIL) 25 MG tablet Take 1 tablet by mouth Daily. 90 tablet 3   • lisinopril (PRINIVIL,ZESTRIL) 20 MG tablet Take 1 tablet by mouth Daily. 90 tablet 3   • Multiple Vitamins-Minerals  "(MULTIVITAMIN ADULT PO) Take 1 tablet by mouth Daily.     • Omega 3 1200 MG capsule Take 1 tablet by mouth Daily.     • prednisoLONE acetate (PRED FORTE) 1 % ophthalmic suspension 1 drop As Needed.     • vitamin C (ASCORBIC ACID) 500 MG tablet Take 500 mg by mouth Daily.     • VITAMIN E PO Take 1 capsule by mouth Daily.     • Zinc 50 MG tablet Take 1 tablet by mouth Daily.     • [DISCONTINUED] HYDROcodone-acetaminophen (NORCO)  MG per tablet Take 1 tablet by mouth Every 4 (Four) Hours As Needed for Moderate Pain . 28 tablet 0     No facility-administered encounter medications on file as of 12/3/2021.       Reviewed use of high risk medication in the elderly: yes  Reviewed for potential of harmful drug interactions in the elderly: yes    Follow Up:  Return in about 6 months (around 6/3/2022) for Recheck.     An After Visit Summary and PPPS with all of these plans were given to the patient.         BP is stable.  Will continue with current regimen.   I've discussed starting baby aspirin due to her smoking history.  She is now down to 1-2 cig/day.  I did discuss bleeding risk, especially with NSAID use.   Labs reviewed.  Will recheck U/A in office today.  Discussed increasing water intake. U/A showing bilirubin but clear of protein and ketones.  Will increase water and recheck in 1 week.  Liver enzymes normal.   Mammogram and Dexa order has been placed in the chart.  No yet due for colonoscopy.   Flu shot given in office.   Continue with self breast exams.   She has never had a hysterectomy and had previous \"burning of precancerous cells\".  I have encouraged her to have pap smear completed every few years.  She will reschedule to have this completed.         "

## 2021-12-13 ENCOUNTER — TELEPHONE (OUTPATIENT)
Dept: INTERNAL MEDICINE | Facility: CLINIC | Age: 66
End: 2021-12-13

## 2021-12-13 NOTE — TELEPHONE ENCOUNTER
Caller: Edel Wilson    Relationship: Self    Best call back number: [phone]     What medication are you requesting: ANTIBIOTIC OR STEROID PACK    What are your current symptoms: DRY COUGH, WHEEZING    How long have you been experiencing symptoms: FOR ABOUT A MONTH     Have you had these symptoms before:    [x] Yes  [] No    Have you been treated for these symptoms before:   [x] Yes  [] No    If a prescription is needed, what is your preferred pharmacy and phone number: [id number] - [place] - [address] AT [address]CROSS FROM [address]  [phone] [facility] [phone] FX     Additional notes:  PATIENT STATED THAT SHE HAS BEEN TAKING INHALER BUT DOES NOT SEEM TO HELP. PLEASE ADVISE.

## 2021-12-14 ENCOUNTER — HOSPITAL ENCOUNTER (OUTPATIENT)
Dept: GENERAL RADIOLOGY | Facility: HOSPITAL | Age: 66
Discharge: HOME | End: 2021-12-14
Admitting: NURSE PRACTITIONER

## 2021-12-14 ENCOUNTER — OFFICE VISIT (OUTPATIENT)
Dept: INTERNAL MEDICINE | Facility: CLINIC | Age: 66
End: 2021-12-14

## 2021-12-14 VITALS
HEART RATE: 83 BPM | DIASTOLIC BLOOD PRESSURE: 80 MMHG | WEIGHT: 131.8 LBS | BODY MASS INDEX: 20.69 KG/M2 | HEIGHT: 67 IN | OXYGEN SATURATION: 98 % | TEMPERATURE: 97.3 F | SYSTOLIC BLOOD PRESSURE: 126 MMHG

## 2021-12-14 DIAGNOSIS — J20.9 ACUTE BRONCHITIS, UNSPECIFIED ORGANISM: ICD-10-CM

## 2021-12-14 DIAGNOSIS — J20.9 ACUTE BRONCHITIS, UNSPECIFIED ORGANISM: Primary | ICD-10-CM

## 2021-12-14 PROCEDURE — 71046 X-RAY EXAM CHEST 2 VIEWS: CPT

## 2021-12-14 PROCEDURE — 96372 THER/PROPH/DIAG INJ SC/IM: CPT | Performed by: NURSE PRACTITIONER

## 2021-12-14 PROCEDURE — 99213 OFFICE O/P EST LOW 20 MIN: CPT | Performed by: NURSE PRACTITIONER

## 2021-12-14 RX ORDER — ALBUTEROL SULFATE 90 UG/1
2 AEROSOL, METERED RESPIRATORY (INHALATION)
COMMUNITY
Start: 2021-10-22 | End: 2021-12-14 | Stop reason: SDUPTHER

## 2021-12-14 RX ORDER — AZITHROMYCIN 250 MG/1
TABLET, FILM COATED ORAL
Qty: 6 TABLET | Refills: 0 | Status: SHIPPED | OUTPATIENT
Start: 2021-12-14 | End: 2022-04-21

## 2021-12-14 RX ORDER — TRIAMCINOLONE ACETONIDE 40 MG/ML
60 INJECTION, SUSPENSION INTRA-ARTICULAR; INTRAMUSCULAR ONCE
Status: COMPLETED | OUTPATIENT
Start: 2021-12-14 | End: 2021-12-14

## 2021-12-14 RX ORDER — ALBUTEROL SULFATE 90 UG/1
2 AEROSOL, METERED RESPIRATORY (INHALATION) EVERY 4 HOURS PRN
Qty: 18 G | Refills: 0 | Status: SHIPPED | OUTPATIENT
Start: 2021-12-14 | End: 2021-12-22

## 2021-12-14 RX ADMIN — TRIAMCINOLONE ACETONIDE 60 MG: 40 INJECTION, SUSPENSION INTRA-ARTICULAR; INTRAMUSCULAR at 09:47

## 2021-12-14 NOTE — PROGRESS NOTES
"Subjective   Edel Wilson is a 66 y.o. female.   Chief Complaint   Patient presents with   • Wheezing     Wheezing, dry cough, on and off since Oct, was given prednisone & inhaler, vomited prednisone cause she was sick so she didn't get to finish the treatment, thinks this might be allergies       Cough  This is a chronic problem. The current episode started more than 1 month ago (On and off since October). The problem has been waxing and waning. Associated symptoms include shortness of breath (with \"doing a lot\") and wheezing (when active or at night). Pertinent negatives include no chest pain, fever, postnasal drip, rash or weight loss. The symptoms are aggravated by lying down. She has tried oral steroids and a beta-agonist inhaler for the symptoms. Improvement on treatment: Previously treated with Prednisone; Albuterol Inhaler. There is no history of COPD.    Michelle was initially evaluated and treated in October by a clinic in [place] with Albuterol inhaler and Prednisone.  She states she took the first day or 2 of Prednisone but then got sick with vomiting and was unable to keep the rest down.  She never finished the Prednisone course.     The following portions of the patient's history were reviewed and updated as appropriate: allergies, current medications, past family history, past medical history, past social history, past surgical history and problem list.    Review of Systems   Constitutional: Negative for activity change, appetite change, fatigue, fever, unexpected weight gain and unexpected weight loss.   HENT: Negative for congestion, postnasal drip, swollen glands, trouble swallowing and voice change.    Eyes: Negative for blurred vision and visual disturbance.   Respiratory: Positive for cough, shortness of breath (with \"doing a lot\") and wheezing (when active or at night).    Cardiovascular: Negative for chest pain, palpitations and leg swelling.   Gastrointestinal: Negative for abdominal " pain, constipation, diarrhea, nausea, vomiting and indigestion.   Endocrine: Negative for cold intolerance, heat intolerance, polydipsia and polyphagia.   Genitourinary: Negative for dysuria and frequency.   Musculoskeletal: Negative for arthralgias, back pain, joint swelling and neck pain.   Skin: Negative for color change, rash and skin lesions.   Neurological: Negative for dizziness, weakness, headache, memory problem and confusion.   Hematological: Does not bruise/bleed easily.   Psychiatric/Behavioral: Negative for agitation, hallucinations and suicidal ideas. The patient is not nervous/anxious.        Objective   Past Medical History:   Diagnosis Date   • Anxiety    • Arthritis    • Bursitis and tendinitis of shoulder region     Left;  w/ small, partial tear to tendon.   • Cancer (HCC)     skin   • Depression    • HYATT (dyspnea on exertion)    • Family history of early CAD    • Full dentures    • Hypertension    • LAFB (left anterior fascicular block)    • RBBB       Past Surgical History:   Procedure Laterality Date   • CHOLECYSTECTOMY  1978   • SKIN CANCER EXCISION  2019   • TUBAL ABDOMINAL LIGATION Bilateral         Current Outpatient Medications:   •  albuterol sulfate  (90 Base) MCG/ACT inhaler, Inhale 2 puffs Every 4 (Four) Hours As Needed for Wheezing., Disp: 18 g, Rfl: 0  •  alendronate (FOSAMAX) 70 MG tablet, TAKE 1 TABLET BY MOUTH EVERY 7 (SEVEN) DAYS., Disp: 12 tablet, Rfl: 3  •  ALPRAZolam XR (XANAX XR) 0.5 MG 24 hr tablet, Take 1 tablet by mouth Every Morning., Disp: 30 tablet, Rfl: 5  •  Calcium Carbonate (CALCIUM 600 PO), Take 1 tablet by mouth Daily., Disp: , Rfl:   •  Cholecalciferol (vitamin D3) 125 MCG (5000 UT) capsule capsule, Take 5,000 Units by mouth Daily., Disp: , Rfl:   •  etodolac (LODINE) 400 MG tablet, TAKE 1 TABLET BY MOUTH TWICE A DAY, Disp: 60 tablet, Rfl: 5  •  hydroCHLOROthiazide (HYDRODIURIL) 25 MG tablet, Take 1 tablet by mouth Daily., Disp: 90 tablet, Rfl: 3  •   lisinopril (PRINIVIL,ZESTRIL) 20 MG tablet, Take 1 tablet by mouth Daily., Disp: 90 tablet, Rfl: 3  •  Multiple Vitamins-Minerals (MULTIVITAMIN ADULT PO), Take 1 tablet by mouth Daily., Disp: , Rfl:   •  Omega 3 1200 MG capsule, Take 1 tablet by mouth Daily., Disp: , Rfl:   •  prednisoLONE acetate (PRED FORTE) 1 % ophthalmic suspension, 1 drop As Needed., Disp: , Rfl:   •  vitamin C (ASCORBIC ACID) 500 MG tablet, Take 500 mg by mouth Daily., Disp: , Rfl:   •  VITAMIN E PO, Take 1 capsule by mouth Daily., Disp: , Rfl:   •  Zinc 50 MG tablet, Take 1 tablet by mouth Daily., Disp: , Rfl:   •  azithromycin (Zithromax Z-Shahzad) 250 MG tablet, Take 2 tablets by mouth on day 1, then 1 tablet daily on days 2-5, Disp: 6 tablet, Rfl: 0    Current Facility-Administered Medications:   •  triamcinolone acetonide (KENALOG-40) injection 60 mg, 60 mg, Intramuscular, Once, Silvia Servin APRN      Vitals:    12/14/21 0901   BP: 126/80   Pulse: 83   Temp: 97.3 °F (36.3 °C)   SpO2: 98%         12/14/21 0901   Weight: 59.8 kg (131 lb 12.8 oz)       Body mass index is 20.64 kg/m².    Physical Exam  Constitutional:       General: She is not in acute distress.     Appearance: She is well-developed.   HENT:      Head: Normocephalic.      Right Ear: Tympanic membrane and external ear normal.      Left Ear: Tympanic membrane and external ear normal.      Mouth/Throat:      Mouth: Mucous membranes are moist. No oral lesions.      Pharynx: Oropharynx is clear.   Eyes:      Conjunctiva/sclera: Conjunctivae normal.   Cardiovascular:      Rate and Rhythm: Normal rate and regular rhythm.      Pulses: Normal pulses.      Heart sounds: Normal heart sounds.   Pulmonary:      Effort: Pulmonary effort is normal.      Breath sounds: Normal breath sounds.      Comments: Chest tight; poor air movement.  Wheeze noted to left lower lobe.  No rales or ronchi noted.   Skin:     General: Skin is warm and dry.   Neurological:      Mental Status: She is alert and  oriented to person, place, and time.      Gait: Gait normal.   Psychiatric:         Mood and Affect: Mood normal.         Speech: Speech normal.         Behavior: Behavior normal.         Thought Content: Thought content normal.               Assessment/Plan   Diagnoses and all orders for this visit:    1. Acute bronchitis, unspecified organism (Primary)  -     triamcinolone acetonide (KENALOG-40) injection 60 mg  -     albuterol sulfate  (90 Base) MCG/ACT inhaler; Inhale 2 puffs Every 4 (Four) Hours As Needed for Wheezing.  Dispense: 18 g; Refill: 0  -     azithromycin (Zithromax Z-Shahzad) 250 MG tablet; Take 2 tablets by mouth on day 1, then 1 tablet daily on days 2-5  Dispense: 6 tablet; Refill: 0  -     XR Chest PA & Lateral; Future        Michelle is not in any acute distress and does not have obvious shortness of breath.  There has been no fever.  Will go ahead and give steroid injection today along with antibiotic and refill on her inhaler.  I would like for her to get a chest x-ray as well due to her ongoing symptoms.  If no improvement or symptoms worsen, will need sooner follow up evaluation.

## 2021-12-22 ENCOUNTER — HOSPITAL ENCOUNTER (OUTPATIENT)
Dept: BONE DENSITY | Facility: HOSPITAL | Age: 66
Discharge: HOME | End: 2021-12-22

## 2021-12-22 ENCOUNTER — HOSPITAL ENCOUNTER (OUTPATIENT)
Dept: MAMMOGRAPHY | Facility: HOSPITAL | Age: 66
Discharge: HOME | End: 2021-12-22

## 2021-12-22 DIAGNOSIS — Z12.31 ENCOUNTER FOR SCREENING MAMMOGRAM FOR MALIGNANT NEOPLASM OF BREAST: ICD-10-CM

## 2021-12-22 DIAGNOSIS — J20.9 ACUTE BRONCHITIS, UNSPECIFIED ORGANISM: ICD-10-CM

## 2021-12-22 DIAGNOSIS — M81.0 OSTEOPOROSIS, UNSPECIFIED OSTEOPOROSIS TYPE, UNSPECIFIED PATHOLOGICAL FRACTURE PRESENCE: ICD-10-CM

## 2021-12-22 PROCEDURE — 77067 SCR MAMMO BI INCL CAD: CPT

## 2021-12-22 PROCEDURE — 77080 DXA BONE DENSITY AXIAL: CPT

## 2021-12-22 PROCEDURE — 77063 BREAST TOMOSYNTHESIS BI: CPT

## 2021-12-22 RX ORDER — ALBUTEROL SULFATE 90 UG/1
2 AEROSOL, METERED RESPIRATORY (INHALATION) EVERY 6 HOURS PRN
Qty: 18 G | Refills: 2 | Status: SHIPPED | OUTPATIENT
Start: 2021-12-22 | End: 2022-02-21

## 2022-01-13 DIAGNOSIS — N63.0 BREAST NODULE: Primary | ICD-10-CM

## 2022-01-13 DIAGNOSIS — R92.8 OTHER ABNORMAL AND INCONCLUSIVE FINDINGS ON DIAGNOSTIC IMAGING OF BREAST: ICD-10-CM

## 2022-01-18 ENCOUNTER — HOSPITAL ENCOUNTER (OUTPATIENT)
Dept: MAMMOGRAPHY | Facility: HOSPITAL | Age: 67
Discharge: HOME | End: 2022-01-18

## 2022-01-18 ENCOUNTER — HOSPITAL ENCOUNTER (OUTPATIENT)
Dept: ULTRASOUND IMAGING | Facility: HOSPITAL | Age: 67
Discharge: HOME | End: 2022-01-18

## 2022-01-18 DIAGNOSIS — F41.9 ANXIETY: ICD-10-CM

## 2022-01-18 DIAGNOSIS — N63.0 BREAST NODULE: ICD-10-CM

## 2022-01-18 DIAGNOSIS — R92.8 OTHER ABNORMAL AND INCONCLUSIVE FINDINGS ON DIAGNOSTIC IMAGING OF BREAST: ICD-10-CM

## 2022-01-18 PROCEDURE — 76642 ULTRASOUND BREAST LIMITED: CPT

## 2022-01-18 NOTE — TELEPHONE ENCOUNTER
Caller: Edel Wilson    Relationship: Self    Best call back number: [phone]    Requested Prescriptions:   Requested Prescriptions     Pending Prescriptions Disp Refills   • ALPRAZolam XR (XANAX XR) 0.5 MG 24 hr tablet 30 tablet 5     Sig: Take 1 tablet by mouth Every Morning.        Pharmacy where request should be sent: [facility] #[id number] - [place], [place] - [address] [facility] [phone] [facility] [phone] FX       Does the patient have less than a 3 day supply:  [x] Yes  [] No    Vanessa [facility] Rep   01/18/22 09:58 CST

## 2022-01-20 RX ORDER — ALPRAZOLAM 0.5 MG/1
0.5 TABLET, EXTENDED RELEASE ORAL EVERY MORNING
Qty: 30 TABLET | Refills: 5 | Status: SHIPPED | OUTPATIENT
Start: 2022-01-20 | End: 2022-04-21 | Stop reason: SDUPTHER

## 2022-02-20 DIAGNOSIS — J20.9 ACUTE BRONCHITIS, UNSPECIFIED ORGANISM: ICD-10-CM

## 2022-02-21 RX ORDER — ALBUTEROL SULFATE 90 UG/1
AEROSOL, METERED RESPIRATORY (INHALATION)
Qty: 18 G | Refills: 2 | Status: SHIPPED | OUTPATIENT
Start: 2022-02-21 | End: 2022-04-21 | Stop reason: SDUPTHER

## 2022-03-04 RX ORDER — ETODOLAC 400 MG/1
TABLET, FILM COATED ORAL
Qty: 60 TABLET | Refills: 2 | Status: SHIPPED | OUTPATIENT
Start: 2022-03-04 | End: 2022-05-24 | Stop reason: SDUPTHER

## 2022-04-13 ENCOUNTER — OFFICE VISIT (OUTPATIENT)
Dept: CARDIOLOGY | Facility: CLINIC | Age: 67
End: 2022-04-13

## 2022-04-13 VITALS
BODY MASS INDEX: 19.55 KG/M2 | DIASTOLIC BLOOD PRESSURE: 60 MMHG | HEIGHT: 68 IN | HEART RATE: 74 BPM | SYSTOLIC BLOOD PRESSURE: 110 MMHG | WEIGHT: 129 LBS

## 2022-04-13 DIAGNOSIS — R94.31 ABNORMAL ECG: ICD-10-CM

## 2022-04-13 DIAGNOSIS — I44.4 LAFB (LEFT ANTERIOR FASCICULAR BLOCK): Chronic | ICD-10-CM

## 2022-04-13 DIAGNOSIS — R06.09 DOE (DYSPNEA ON EXERTION): Primary | ICD-10-CM

## 2022-04-13 DIAGNOSIS — I70.90 ATHEROSCLEROSIS: ICD-10-CM

## 2022-04-13 DIAGNOSIS — Z78.9 EX-SMOKER FOR LESS THAN 1 YEAR: ICD-10-CM

## 2022-04-13 DIAGNOSIS — I65.23 ATHEROSCLEROSIS OF BOTH CAROTID ARTERIES: ICD-10-CM

## 2022-04-13 DIAGNOSIS — I10 ESSENTIAL HYPERTENSION: Chronic | ICD-10-CM

## 2022-04-13 DIAGNOSIS — I45.10 RBBB: Chronic | ICD-10-CM

## 2022-04-13 PROBLEM — F17.200 SMOKER: Status: RESOLVED | Noted: 2020-09-02 | Resolved: 2022-04-13

## 2022-04-13 PROCEDURE — 93000 ELECTROCARDIOGRAM COMPLETE: CPT | Performed by: INTERNAL MEDICINE

## 2022-04-13 PROCEDURE — 99214 OFFICE O/P EST MOD 30 MIN: CPT | Performed by: INTERNAL MEDICINE

## 2022-04-13 RX ORDER — ROSUVASTATIN CALCIUM 5 MG/1
5 TABLET, COATED ORAL DAILY
Qty: 90 TABLET | Refills: 3 | Status: SHIPPED | OUTPATIENT
Start: 2022-04-13

## 2022-04-13 NOTE — PROGRESS NOTES
Edel Wilson  [id number]  1955  66 y.o.  female    Referring Provider: Silvia Servin APRN    Reason for  Visit:      Here for routine follow up   Initial visit for  shortness of breath   Referred for abnormal ECG right  bundle branch block   left anterior fascicular block  Cardiac workup test results as below: echo, stress test        Subjective    No new cardiac events or complaints since last visit   Has had left eye cataract surgery 2 weeks ago and did well   Due for other eye surgery     BP well controlled at home.     Tolerating current medications well with no untoward side effects   Compliant with prescribed medication regimen. Tries to adhere to cardiac diet.      Mild chronic exertional shortness of breath on exertion relieved with rest  No significant cough or wheezing    No palpitations  No associated chest pain  No significant pedal edema    No fever or chills  No significant expectoration    No hemoptysis  No presyncope or syncope    Tolerating current medications well with no untoward side effects   Compliant with prescribed medication regimen. Tries to adhere to cardiac diet.   Smoker till 4 days ago    Strong family history of early coronary artery disease    Joint pain in small, medium and large joints   Chronic low back pain      No new events or complaints since last visit   No bleeding, excessive bruising, gait instability or fall risks          History of present illness:  Edel Wilson is a 66 y.o. yo female with  essential hypertension    who presents today for   Chief Complaint   Patient presents with   • Hypertension     6mo- no issues PCP wanted her to ask about taking ASA    .    History  Past Medical History:   Diagnosis Date   • Anxiety    • Arthritis    • Bursitis and tendinitis of shoulder region     Left;  w/ small, partial tear to tendon.   • Cancer (HCC)     skin   • Depression    • HYATT (dyspnea on exertion)    • Family history of early CAD    • Full dentures    •  Hypertension    • LAFB (left anterior fascicular block)    • RBBB    ,   Past Surgical History:   Procedure Laterality Date   • BREAST BIOPSY Right    • CHOLECYSTECTOMY     • SKIN CANCER EXCISION     • TUBAL ABDOMINAL LIGATION Bilateral    ,   Family History   Problem Relation Age of Onset   • Arthritis Mother    • Hypertension Mother    • Depression Mother    • Cancer Maternal Grandmother    • Arthritis Paternal Grandmother    • Cancer Paternal Grandmother    • Breast cancer Paternal Grandmother    • Cancer Other    ,   Social History     Tobacco Use   • Smoking status: Former Smoker     Packs/day: 0.25     Years: 40.00     Pack years: 10.00     Types: Cigarettes     Quit date: 3/9/2022     Years since quittin.0   • Smokeless tobacco: Never Used   • Tobacco comment: occasional; 1-2 cigarette per day   Vaping Use   • Vaping Use: Never used   Substance Use Topics   • Alcohol use: Yes     Alcohol/week: 1.0 - 2.0 standard drink     Types: 1 - 2 Cans of beer per week     Comment: 2 a night   • Drug use: Never   ,     Medications  Current Outpatient Medications   Medication Sig Dispense Refill   • albuterol sulfate  (90 Base) MCG/ACT inhaler INHALE 2 PUFFS EVERY 6 HOURS AS NEEDED FOR WHEEZING 18 g 2   • alendronate (FOSAMAX) 70 MG tablet TAKE 1 TABLET BY MOUTH EVERY 7 (SEVEN) DAYS. 12 tablet 3   • ALPRAZolam XR (XANAX XR) 0.5 MG 24 hr tablet Take 1 tablet by mouth Every Morning. 30 tablet 5   • azithromycin (Zithromax Z-Shahzad) 250 MG tablet Take 2 tablets by mouth on day 1, then 1 tablet daily on days 2-5 6 tablet 0   • Calcium Carbonate (CALCIUM 600 PO) Take 1 tablet by mouth Daily.     • Cholecalciferol (vitamin D3) 125 MCG (5000 UT) capsule capsule Take 5,000 Units by mouth Daily.     • etodolac (LODINE) 400 MG tablet TAKE 1 TABLET BY MOUTH TWICE A DAY 60 tablet 2   • hydroCHLOROthiazide (HYDRODIURIL) 25 MG tablet Take 1 tablet by mouth Daily. 90 tablet 3   • lisinopril (PRINIVIL,ZESTRIL) 20 MG tablet  "Take 1 tablet by mouth Daily. 90 tablet 3   • Multiple Vitamins-Minerals (MULTIVITAMIN ADULT PO) Take 1 tablet by mouth Daily.     • Omega 3 1200 MG capsule Take 1 tablet by mouth Daily.     • prednisoLONE acetate (PRED FORTE) 1 % ophthalmic suspension 1 drop As Needed.     • vitamin C (ASCORBIC ACID) 500 MG tablet Take 500 mg by mouth Daily.     • VITAMIN E PO Take 1 capsule by mouth Daily.     • Zinc 50 MG tablet Take 1 tablet by mouth Daily.     • rosuvastatin (CRESTOR) 5 MG tablet Take 1 tablet by mouth Daily. 90 tablet 3     No current facility-administered medications for this visit.       Allergies:  Patient has no known allergies.    Review of Systems  Review of Systems   Constitutional: Negative.   HENT: Negative.    Eyes: Negative.    Cardiovascular: Positive for dyspnea on exertion. Negative for chest pain, claudication, cyanosis, irregular heartbeat, leg swelling, near-syncope, orthopnea, palpitations, paroxysmal nocturnal dyspnea and syncope.   Respiratory: Negative.    Endocrine: Negative.    Hematologic/Lymphatic: Negative.    Skin: Negative.    Gastrointestinal: Negative for anorexia.   Genitourinary: Negative.    Neurological: Negative.    Psychiatric/Behavioral: Negative.        Objective     Physical Exam:  /60   Pulse 74   Ht 172.7 cm (68\")   Wt 58.5 kg (129 lb)   LMP  (LMP Unknown)   BMI 19.61 kg/m²     Physical Exam   HENT:   Head: Normocephalic.   Eyes: Lids are normal.   Neck: Carotid bruit is not present.   Cardiovascular: Regular rhythm, S1 normal, S2 normal and normal heart sounds.      No systolic murmur is present.  Pulmonary/Chest: Effort normal.   Abdominal: Soft. Normal appearance.   Neurological: She is alert.   Psychiatric: Her speech is normal and behavior is normal. Thought content normal.       Results Review:      Total Cholesterol   0 - 200 mg/dL 212 High   200  180 R        Triglycerides   0 - 150 mg/dL 65  43  53 R    HDL Cholesterol   40 - 60 mg/dL 109 High   " 115 High   94 R    VLDL Cholesterol Bunny   5 - 40 mg/dL 12      LDL Chol Calc ([facility])   0 - 100 mg/dL 91  76  75 R    Resulting Agency          FINDINGS:     1.Atherosclerotic changes are noted in the arch of the aorta as well as the origins of the major arteries of the aorta.     Major extracranial vessels to include visualized brachiocephalic, right and left common carotid arteries, demonstrate patent flow related enhancement.     2. Atherosclerotic plaque noted at the origin of the right and left internal carotid arteries and the distal common carotid arteries.     Based on the NASCET criteria, no hemodynamically significant stenosis noted in the distal common carotids and proximal internal carotid arteries.     3. Both vertebral arteries are noted to be patent with the left vertebral artery being dominant.     4.Spondylotic changes noted in the cervical and thoracic spine. Subtle lucency noted in the T2 vertebra to the left of the midline. Emphysematous changes in the lung fields. Scarring noted in the lung apices.  Exam End: 05/15/20 09:15    Specimen Collected: 05/15/20 09:15 Last Resulted: 05/15/20 09:24   Received From: [employer]          Results for orders placed during the hospital encounter of 20    Adult Transthoracic Echo Complete W/ Cont if Necessary Per Protocol    Interpretation Summary  · Left ventricular ejection fraction appears to be 61 - 65%. Left ventricular systolic function is normal.  · Left ventricular diastolic function was normal  · Estimated right ventricular systolic pressure from tricuspid regurgitation is normal (<35 mmHg).       Edel Wilson  Regadenoson Stress Test With Myocardial Perfusion SPECT (Multi Study)  Order# [id number]  Reading physician: Giovanni Lino MD Ordering physician: Giovanni Lino MD Study date: 20       Patient Information    Patient Name   Edel Wilson MRN   [id number] Legal Sex   Female  (Age)   1955 (66 y.o.)            Interpretation Summary       · Breast attenuation and GI artifacts are present.  · (Calculated EF = 65%).  · Myocardial perfusion imaging indicates a normal myocardial perfusion study with no evidence of ischemia.  · Impressions are consistent with a low risk study.             ECG 12 Lead    Date/Time: 4/13/2022 10:31 AM  Performed by: Giovanni Lino MD  Authorized by: Giovanni Lino MD   Comparison: compared with previous ECG from 10/14/2021  Similar to previous ECG  Rhythm: sinus rhythm  Rate: normal  Conduction: right bundle branch block and left anterior fascicular block  QRS axis: left  Other findings: left ventricular hypertrophy    Clinical impression: abnormal EKG          ____________________________________________________________________________________________________________________________________________  Health maintenance and recommendations    Low salt/ HTN/ Heart healthy carbohydrate restricted cardiac diet (printed dietary and general instructions provided for home review )  The patient is advised to reduce or avoid caffeine or other cardiac stimulants.     The patient was advised to avoid long term NSAIDS , use Tylenol PRN instead  Avoid cardiac stimulants including common drugs like Pseudoephedrine or excessive amounts of caffeine  Monitor for any signs of bleeding including red or dark stools. Fall precautions.   Advised staying uptodate with immunizations per established standard guidelines.  Offered to give patient  a copy      Questions were encouraged, asked and answered to the patient's  understanding and satisfaction. Questions if any regarding current medications and side effects, need for refills and importance of compliance to medications stressed.    Reviewed available prior notes, consults, prior visits, laboratory findings, radiology and cardiology relevant reports. Updated chart as applicable. I have reviewed the patient's medical history in detail and updated the  computerized patient record as relevant.      Updated patient regarding any new or relevant abnormalities on review of records or any new findings on physical exam. Mentioned to patient about purpose of visit and desirable health short and long term goals and objectives.    Primary to monitor CBC CMP Lipid panel and TSH as applicable    ___________________________________________________________________________________________________________________________________________          Assessment/Plan   Diagnoses and all orders for this visit:    1. HYATT (dyspnea on exertion) (Primary)    2. Essential hypertension    3. LAFB (left anterior fascicular block)    4. RBBB    5. Abnormal ECG    6. Atherosclerosis neck vessels     7. Ex-smoker for less than 1 year    8. Atherosclerosis of both carotid arteries  -     US Carotid Bilateral; Future    Other orders  -     ECG 12 Lead  -     rosuvastatin (CRESTOR) 5 MG tablet; Take 1 tablet by mouth Daily.  Dispense: 90 tablet; Refill: 3           Plan           Patient expressed understanding  Encouraged and answered all questions   Discussed with the patient and all questioned fully answered. She will call me if any problems arise.   Discussed results of prior testing with patient : echo and stress test and prior neck imaging   as well electrocardiogram from today   Orders Placed This Encounter   Procedures   • US Carotid Bilateral     Standing Status:   Future     Standing Expiration Date:   4/13/2023     Order Specific Question:   Reason for Exam:     Answer:   carotid atherosclerosis   • ECG 12 Lead     This order was created via procedure documentation     Order Specific Question:   Release to patient     Answer:   Immediate          Overall doing well no new cardiovascular symptoms and therefore no additional cardiac testing is required   If any interim issues arise will call me for further evaluation.     Keep LDL below 70 mg/dl. Monitor liver and renal functions.    Monitor CBC, CMP, TSH (as indicated) and Lipid Panel by primary   Will start low dose statin therapy   Requested Prescriptions     Signed Prescriptions Disp Refills   • rosuvastatin (CRESTOR) 5 MG tablet 90 tablet 3     Sig: Take 1 tablet by mouth Daily.        Start ECASA 81 mg daily regimen given risk factors and monitor for any signs of bleeding including red or dark stools. Fall precautions.    I support the patient's decision to take the Covid -19 vaccine   Had required complete course   No major issues   Now fully immunized    Had booster too      Check BP and heart rates twice daily initially till blood pressures and heart rates under good control and then at least 3x / week,   If blood pressures continue to be well-controlled then can check week a month  at home and bring a recording for review next visit  If BP >130/85 or < 100/60 persistently over 3 reading 30 mins apart or if heart rates persistently above 100 bpm or less than 55 bpm call sooner for evaluation and advise     Monitor for any signs of bleeding including red or dark stools as well as easy bruisabilty. Fall precautions.     Follow up with LUAN Smith to address interim issues             Return in about 6 months (around 10/13/2022).

## 2022-04-15 ENCOUNTER — HOSPITAL ENCOUNTER (OUTPATIENT)
Dept: ULTRASOUND IMAGING | Facility: HOSPITAL | Age: 67
Discharge: HOME | End: 2022-04-15
Admitting: INTERNAL MEDICINE

## 2022-04-15 DIAGNOSIS — I65.23 ATHEROSCLEROSIS OF BOTH CAROTID ARTERIES: ICD-10-CM

## 2022-04-15 PROCEDURE — 93880 EXTRACRANIAL BILAT STUDY: CPT | Performed by: SURGERY

## 2022-04-15 PROCEDURE — 93880 EXTRACRANIAL BILAT STUDY: CPT

## 2022-04-21 ENCOUNTER — HOSPITAL ENCOUNTER (OUTPATIENT)
Dept: GENERAL RADIOLOGY | Facility: HOSPITAL | Age: 67
Discharge: HOME | End: 2022-04-21
Admitting: NURSE PRACTITIONER

## 2022-04-21 ENCOUNTER — OFFICE VISIT (OUTPATIENT)
Dept: INTERNAL MEDICINE | Facility: CLINIC | Age: 67
End: 2022-04-21

## 2022-04-21 VITALS
OXYGEN SATURATION: 98 % | SYSTOLIC BLOOD PRESSURE: 118 MMHG | HEART RATE: 111 BPM | WEIGHT: 127 LBS | TEMPERATURE: 97.3 F | HEIGHT: 68 IN | DIASTOLIC BLOOD PRESSURE: 70 MMHG | BODY MASS INDEX: 19.25 KG/M2

## 2022-04-21 DIAGNOSIS — J20.9 ACUTE BRONCHITIS, UNSPECIFIED ORGANISM: ICD-10-CM

## 2022-04-21 DIAGNOSIS — F41.9 ANXIETY: ICD-10-CM

## 2022-04-21 DIAGNOSIS — J43.9 PULMONARY EMPHYSEMA, UNSPECIFIED EMPHYSEMA TYPE: Primary | ICD-10-CM

## 2022-04-21 PROCEDURE — 99214 OFFICE O/P EST MOD 30 MIN: CPT | Performed by: NURSE PRACTITIONER

## 2022-04-21 PROCEDURE — 71046 X-RAY EXAM CHEST 2 VIEWS: CPT

## 2022-04-21 RX ORDER — AZITHROMYCIN 250 MG/1
TABLET, FILM COATED ORAL
Qty: 6 TABLET | Refills: 0 | Status: SHIPPED | OUTPATIENT
Start: 2022-04-21 | End: 2022-05-06

## 2022-04-21 RX ORDER — ALBUTEROL SULFATE 90 UG/1
2 AEROSOL, METERED RESPIRATORY (INHALATION) EVERY 6 HOURS PRN
Qty: 18 G | Refills: 2 | Status: SHIPPED | OUTPATIENT
Start: 2022-04-21 | End: 2022-10-13 | Stop reason: SDUPTHER

## 2022-04-21 RX ORDER — METHYLPREDNISOLONE 4 MG/1
TABLET ORAL
Qty: 1 EACH | Refills: 0 | Status: SHIPPED | OUTPATIENT
Start: 2022-04-21 | End: 2022-05-06

## 2022-04-21 RX ORDER — ALPRAZOLAM 0.5 MG/1
0.5 TABLET, EXTENDED RELEASE ORAL EVERY MORNING
Qty: 30 TABLET | Refills: 5 | Status: SHIPPED | OUTPATIENT
Start: 2022-04-21 | End: 2022-11-11 | Stop reason: SDUPTHER

## 2022-04-21 NOTE — PROGRESS NOTES
"Chief Complaint  Shortness of Breath (Since having Cataract surgery on March 29th. Having the other cataract done on Tuesday.Needs inhaler much more frequently. )    Subjective          Edel Wilson presents to [facility]  Patient has been wheezing and SOB since her cataract surgery. She is nervous because she she has been using her inhaler more and she was afraid she was going to run out.       Objective   Vital Signs:   /70 (BP Location: Left arm, Patient Position: Sitting, Cuff Size: Adult)   Pulse 111   Temp 97.3 °F (36.3 °C)   Ht 172.7 cm (68\")   Wt 57.6 kg (127 lb)   SpO2 98%   BMI 19.31 kg/m²     BMI is within normal parameters. No follow-up required.      Physical Exam  Vitals and nursing note reviewed.   Constitutional:       Appearance: Normal appearance. She is well-developed.   HENT:      Head: Normocephalic and atraumatic.      Right Ear: Tympanic membrane, ear canal and external ear normal.      Left Ear: Tympanic membrane, ear canal and external ear normal.      Nose: Nose normal. No septal deviation, nasal tenderness or congestion.      Mouth/Throat:      Lips: Pink. No lesions.      Mouth: Mucous membranes are moist. No oral lesions.      Dentition: Normal dentition.      Pharynx: Oropharynx is clear. No pharyngeal swelling, oropharyngeal exudate or posterior oropharyngeal erythema.   Eyes:      General: Lids are normal. Vision grossly intact. No scleral icterus.        Right eye: No discharge.         Left eye: No discharge.      Extraocular Movements: Extraocular movements intact.      Conjunctiva/sclera: Conjunctivae normal.      Right eye: Right conjunctiva is not injected.      Left eye: Left conjunctiva is not injected.      Pupils: Pupils are equal, round, and reactive to light.   Neck:      Thyroid: No thyroid mass.      Trachea: Trachea normal.   Cardiovascular:      Rate and Rhythm: Normal rate and regular rhythm.      Heart sounds: Normal " heart sounds. No murmur heard.    No gallop.   Pulmonary:      Effort: Pulmonary effort is normal.      Breath sounds: Decreased air movement present. Wheezing and rhonchi present. No rales.   Musculoskeletal:         General: No tenderness or deformity. Normal range of motion.      Cervical back: Full passive range of motion without pain, normal range of motion and neck supple.      Thoracic back: Normal.      Right lower leg: No edema.      Left lower leg: No edema.   Skin:     General: Skin is warm and dry.      Coloration: Skin is not jaundiced.      Findings: No rash.   Neurological:      Mental Status: She is alert and oriented to person, place, and time.      Cranial Nerves: Cranial nerves are intact.      Sensory: Sensation is intact.      Motor: Motor function is intact.      Coordination: Coordination is intact.      Gait: Gait is intact.      Deep Tendon Reflexes: Reflexes are normal and symmetric.   Psychiatric:         Mood and Affect: Mood and affect normal.         Behavior: Behavior normal.         Judgment: Judgment normal.        Result Review :                 Assessment and Plan    Diagnoses and all orders for this visit:    1. Acute bronchitis, unspecified organism  -     albuterol sulfate  (90 Base) MCG/ACT inhaler; Inhale 2 puffs Every 6 (Six) Hours As Needed for Wheezing. for wheezing  Dispense: 18 g; Refill: 2  -     methylPREDNISolone (MEDROL) 4 MG dose pack; Take as directed on package instructions.  Dispense: 1 each; Refill: 0  -     azithromycin (Zithromax) 250 MG tablet; As directed  Dispense: 6 tablet; Refill: 0  -     XR Chest 2 View; Future    2. Anxiety  -     ALPRAZolam XR (XANAX XR) 0.5 MG 24 hr tablet; Take 1 tablet by mouth Every Morning.  Dispense: 30 tablet; Refill: 5    Patient has been wheezing and SOB since her cataract surgery. She is nervous because she she has been using her inhaler more and she was afraid she was going to run out. On exam she has wheezing and  ginny through out. 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 She does not have the official dx of COPD, but that is what I am suspecting. She states that she feels like she cannot catch her breath, especially at night when she is laying down.   Plan:  1. Treat with a zpack and steroid pack today  2. Refill albuterol  3. CXR today  4. I jojo like her to get PFTs in the future and consider a pulm referral.     Follow Up   Return in about 3 months (around 7/21/2022) for Recheck.  Patient was given instructions and counseling regarding her condition or for health maintenance advice. Please see specific information pulled into the AVS if appropriate.

## 2022-04-21 NOTE — PROGRESS NOTES
There are emphsema changes noted to the lungs, even compared to the xray completed in December. I had put in my note if she was not better with treatment to get a pulm referral. However, due to these changes please go ahead and start that referral process and set up patient with PFT.

## 2022-04-26 ENCOUNTER — TELEPHONE (OUTPATIENT)
Dept: FAMILY MEDICINE CLINIC | Facility: CLINIC | Age: 67
End: 2022-04-26

## 2022-04-26 DIAGNOSIS — Z01.811 PRE-PROCEDURAL RESPIRATORY EXAMINATION: Primary | ICD-10-CM

## 2022-04-26 NOTE — TELEPHONE ENCOUNTER
Caller: [facility] Scheduling    Relationship to patient:     Best call back number: [phone]    Patient is needing an order for a COVID test for 5/2. She will have this done prior to a breathing test at [facility] thru. Please advise.

## 2022-05-02 ENCOUNTER — LAB (OUTPATIENT)
Dept: LAB | Facility: HOSPITAL | Age: 67
End: 2022-05-02

## 2022-05-02 DIAGNOSIS — Z01.811 PRE-PROCEDURAL RESPIRATORY EXAMINATION: ICD-10-CM

## 2022-05-02 LAB — SARS-COV-2 ORF1AB RESP QL NAA+PROBE: NOT DETECTED

## 2022-05-02 PROCEDURE — U0004 COV-19 TEST NON-CDC HGH THRU: HCPCS

## 2022-05-02 PROCEDURE — C9803 HOPD COVID-19 SPEC COLLECT: HCPCS

## 2022-05-02 PROCEDURE — U0005 INFEC AGEN DETEC AMPLI PROBE: HCPCS

## 2022-05-03 ENCOUNTER — HOSPITAL ENCOUNTER (OUTPATIENT)
Dept: PULMONOLOGY | Facility: HOSPITAL | Age: 67
Discharge: HOME | End: 2022-05-03
Admitting: NURSE PRACTITIONER

## 2022-05-03 DIAGNOSIS — J43.9 PULMONARY EMPHYSEMA, UNSPECIFIED EMPHYSEMA TYPE: ICD-10-CM

## 2022-05-03 PROCEDURE — 94726 PLETHYSMOGRAPHY LUNG VOLUMES: CPT | Performed by: INTERNAL MEDICINE

## 2022-05-03 PROCEDURE — 94726 PLETHYSMOGRAPHY LUNG VOLUMES: CPT

## 2022-05-03 PROCEDURE — 94060 EVALUATION OF WHEEZING: CPT

## 2022-05-03 PROCEDURE — 94060 EVALUATION OF WHEEZING: CPT | Performed by: INTERNAL MEDICINE

## 2022-05-03 RX ORDER — ALBUTEROL SULFATE 2.5 MG/3ML
2.5 SOLUTION RESPIRATORY (INHALATION) ONCE
Status: COMPLETED | OUTPATIENT
Start: 2022-05-03 | End: 2022-05-03

## 2022-05-03 RX ADMIN — ALBUTEROL SULFATE 2.5 MG: 2.5 SOLUTION RESPIRATORY (INHALATION) at 13:00

## 2022-05-06 ENCOUNTER — OFFICE VISIT (OUTPATIENT)
Dept: PULMONOLOGY | Facility: CLINIC | Age: 67
End: 2022-05-06

## 2022-05-06 VITALS
OXYGEN SATURATION: 98 % | HEIGHT: 68 IN | HEART RATE: 70 BPM | SYSTOLIC BLOOD PRESSURE: 124 MMHG | DIASTOLIC BLOOD PRESSURE: 72 MMHG | WEIGHT: 130 LBS | BODY MASS INDEX: 19.7 KG/M2

## 2022-05-06 DIAGNOSIS — F41.9 ANXIETY AND DEPRESSION: ICD-10-CM

## 2022-05-06 DIAGNOSIS — F32.A ANXIETY AND DEPRESSION: ICD-10-CM

## 2022-05-06 DIAGNOSIS — R05.9 COUGH: ICD-10-CM

## 2022-05-06 DIAGNOSIS — Z78.9 EX-SMOKER FOR LESS THAN 1 YEAR: ICD-10-CM

## 2022-05-06 DIAGNOSIS — J43.2 CENTRILOBULAR EMPHYSEMA: ICD-10-CM

## 2022-05-06 DIAGNOSIS — J30.89 NON-SEASONAL ALLERGIC RHINITIS, UNSPECIFIED TRIGGER: ICD-10-CM

## 2022-05-06 DIAGNOSIS — J44.9 CHRONIC OBSTRUCTIVE PULMONARY DISEASE, UNSPECIFIED COPD TYPE: Primary | ICD-10-CM

## 2022-05-06 DIAGNOSIS — J84.10 GRANULOMATOUS LUNG DISEASE: ICD-10-CM

## 2022-05-06 DIAGNOSIS — R06.02 SOB (SHORTNESS OF BREATH) ON EXERTION: ICD-10-CM

## 2022-05-06 PROCEDURE — 99204 OFFICE O/P NEW MOD 45 MIN: CPT | Performed by: INTERNAL MEDICINE

## 2022-05-06 RX ORDER — LORATADINE 10 MG/1
10 TABLET ORAL DAILY
Qty: 90 TABLET | Refills: 3 | Status: SHIPPED | OUTPATIENT
Start: 2022-05-06

## 2022-05-06 RX ORDER — DIPHENOXYLATE HYDROCHLORIDE AND ATROPINE SULFATE 2.5; .025 MG/1; MG/1
1 TABLET ORAL DAILY
COMMUNITY
End: 2022-10-13

## 2022-05-06 RX ORDER — AZELASTINE HYDROCHLORIDE 137 UG/1
2 SPRAY, METERED NASAL 2 TIMES DAILY
Qty: 30 ML | Refills: 11 | Status: SHIPPED | OUTPATIENT
Start: 2022-05-06

## 2022-05-06 RX ORDER — ALBUTEROL SULFATE 90 UG/1
2 AEROSOL, METERED RESPIRATORY (INHALATION) EVERY 4 HOURS PRN
Qty: 6.7 G | Refills: 5 | Status: SHIPPED | OUTPATIENT
Start: 2022-05-06

## 2022-05-06 RX ORDER — FLUTICASONE PROPIONATE AND SALMETEROL 250; 50 UG/1; UG/1
1 POWDER RESPIRATORY (INHALATION) 2 TIMES DAILY
Qty: 60 EACH | Refills: 5 | Status: SHIPPED | OUTPATIENT
Start: 2022-05-06 | End: 2022-10-13

## 2022-05-06 RX ORDER — FLUTICASONE PROPIONATE 50 MCG
2 SPRAY, SUSPENSION (ML) NASAL DAILY
Qty: 16 G | Refills: 11 | Status: SHIPPED | OUTPATIENT
Start: 2022-05-06

## 2022-05-06 NOTE — PROGRESS NOTES
RESPIRATORY DISEASE CLINIC NEW CONSULT NOTE     Patient: Edel Wilson      :  1955   Age: 66 y.o.    Date of Service: May 6, 2022      Subjective:   Requesting Physician: Chery Montejo APRN     Reason for Consultation:    Diagnosis Plan   1. Chronic obstructive pulmonary disease, unspecified COPD type (HCC)     2. Ex-smoker for less than 1 year     3. Anxiety and depression     4. Non-seasonal allergic rhinitis, unspecified trigger     5. SOB (shortness of breath) on exertion     6. Cough     7. Granulomatous lung disease (HCC)     8. Centrilobular emphysema (HCC)          Chief Complaint:     Chief Complaint   Patient presents with   • Abnormal Imaging      chest xray emphysema 5/3 pulmonary function test @         History of present illness: Edel Wilson is a 66 y.o. female who presents to the office today to be seen for    Diagnosis Plan   1. Chronic obstructive pulmonary disease, unspecified COPD type (HCC)     2. Ex-smoker for less than 1 year     3. Anxiety and depression     4. Non-seasonal allergic rhinitis, unspecified trigger     5. SOB (shortness of breath) on exertion     6. Cough     7. Granulomatous lung disease (HCC)     8. Centrilobular emphysema (HCC)        Other problems per record.  Patient is a very pleasant middle aged  female who was seen in the pulmonary clinic as a new pulmonary consult.  She was referred to the pulmonary clinic for emphysema and chronic obstructive pulmonary disease with shortness of breath.    Patient was a heavy smoker and started smoking in teenage years and smoked for almost 50 years but quit smoking a few months ago.  Apparently she was very active and was working in [employer] for [employer] in jobs and she did grow up in a farm.  She does have a history of childhood asthma.  She was attending her granddaughter's wedding in 2021 and suddenly developed shortness of breath and was taken to the urgent care.  She was  noted to have chronic obstructive pulmonary with acute exacerbation.  She was given a course of steroids antibiotics and inhalers and was sent home.  A few months later she was seen by primary care provider and was checking for her influenza vaccine but that time she was again having some problem with shortness of breath and wheezing and chest tightness and was given steroid injection in the clinic and was again advised to continue on the inhalers.  She was using only albuterol as a rescue inhaler but was not started on any long-term inhaler.  She had a pulmonary function test done in the [facility] and was noted to have moderate obstructive airway disease with FEV1 60% predicted but she had 17% improvement of FEV1 after bronchodilator challenge suggesting underlying reactive airway disease with COPD as well.  She has significant air trapping and hyperinflation noted as well.    Patient again continue the same medications and went to visit her mother in [place] to help her moving house and then developed shortness of breath again.  This time she returned to the primary care provider and was sent to me for further evaluation.  She did not have any CT scan of the chest low-dose CT screening of the chest done although she had a long history of tobacco use.  However she had 2 chest x-rays done in 2021 which showed she has severe emphysema or hyperinflation of the lungs with granulomas and some chronic interstitial changes.  She is not on home oxygen.  She did not have COVID infection.  She is already vaccinated for COVID with 3 dose of COVID-vaccine.  She is currently living with her son who is not a smoker but her grandson who is 20 years old is a smoker and sometimes smokes around her.  She is currently  but her former  was a heavy smoker as well and will smoke in the house.    She has no history of pneumonia or hospitalization and did not need any ventilator support in the past.  She grew up in a  farm and also wrist animals as a child but did not have any childhood asthma.  She reported having allergy problems which recently worse after the weather change.  She has cough with clear expectoration sneezing nasal drainage and postnasal drip.  This is a year-long problem and she had this problem for quite some time.  Currently she is not on any allergy medications.  She takes alprazolam for anxiety.    Past History  Past Medical History:   Diagnosis Date   • Anxiety    • Arthritis    • Bursitis and tendinitis of shoulder region     Left;  w/ small, partial tear to tendon.   • Cancer (HCC)     skin   • Depression    • HYATT (dyspnea on exertion)    • Family history of early CAD    • Full dentures    • Hypertension    • LAFB (left anterior fascicular block)    • RBBB      Past Surgical History:   Procedure Laterality Date   • BREAST BIOPSY Right    • CHOLECYSTECTOMY  1978   • SKIN CANCER EXCISION  2019   • TUBAL ABDOMINAL LIGATION Bilateral      No Known Allergies  Current Outpatient Medications   Medication Sig Dispense Refill   • albuterol sulfate  (90 Base) MCG/ACT inhaler Inhale 2 puffs Every 6 (Six) Hours As Needed for Wheezing. for wheezing 18 g 2   • alendronate (FOSAMAX) 70 MG tablet TAKE 1 TABLET BY MOUTH EVERY 7 (SEVEN) DAYS. 12 tablet 3   • ALPRAZolam XR (XANAX XR) 0.5 MG 24 hr tablet Take 1 tablet by mouth Every Morning. 30 tablet 5   • Calcium Carbonate (CALCIUM 600 PO) Take 1 tablet by mouth Daily.     • Cholecalciferol (vitamin D3) 125 MCG (5000 UT) capsule capsule Take 5,000 Units by mouth Daily.     • etodolac (LODINE) 400 MG tablet TAKE 1 TABLET BY MOUTH TWICE A DAY 60 tablet 2   • hydroCHLOROthiazide (HYDRODIURIL) 25 MG tablet Take 1 tablet by mouth Daily. 90 tablet 3   • lisinopril (PRINIVIL,ZESTRIL) 20 MG tablet Take 1 tablet by mouth Daily. 90 tablet 3   • Multiple Vitamins-Minerals (MULTIVITAMIN ADULT PO) Take 1 tablet by mouth Daily.     • multivitamin (THERAGRAN) tablet tablet Take 1  tablet by mouth Daily.     • Omega 3 1200 MG capsule Take 1 tablet by mouth Daily.     • prednisoLONE acetate (PRED FORTE) 1 % ophthalmic suspension 1 drop As Needed.     • rosuvastatin (CRESTOR) 5 MG tablet Take 1 tablet by mouth Daily. 90 tablet 3   • vitamin C (ASCORBIC ACID) 500 MG tablet Take 500 mg by mouth Daily.     • VITAMIN E PO Take 1 capsule by mouth Daily.     • Zinc 50 MG tablet Take 1 tablet by mouth Daily.       No current facility-administered medications for this visit.     Social History     Socioeconomic History   • Marital status:    Tobacco Use   • Smoking status: Former Smoker     Packs/day: 0.25     Years: 49.00     Pack years: 12.25     Types: Cigarettes     Start date:      Quit date: 3/9/2022     Years since quittin.1   • Smokeless tobacco: Never Used   Vaping Use   • Vaping Use: Never used   Substance and Sexual Activity   • Alcohol use: Yes     Alcohol/week: 1.0 - 2.0 standard drink     Types: 1 - 2 Cans of beer per week     Comment: 2 a night   • Drug use: Never   • Sexual activity: Not Currently     Family History   Problem Relation Age of Onset   • Arthritis Mother    • Hypertension Mother    • Depression Mother    • Cancer Maternal Grandmother    • Arthritis Paternal Grandmother    • Cancer Paternal Grandmother    • Breast cancer Paternal Grandmother    • Cancer Other      Immunization History   Administered Date(s) Administered   • COVID-19 (MODERNA) 1st, 2nd, 3rd Dose Only 2021, 2021, 2021   • Flu Vaccine Intradermal Quad 18-64YR 2018   • FluLaval/Fluarix/Fluzone >6 2021   • Pneumococcal Conjugate 13-Valent (PCV13) 2019   • Pneumococcal Polysaccharide (PPSV23) 2020   • Shingrix 2021, 10/04/2021       Review of Systems  A complete review of systems is performed and all other systems were reviewed and negative except as noted above in the HPI.  Review of Systems   Constitutional: Positive for fatigue.   HENT: Positive  "for congestion, postnasal drip and sinus pressure.    Eyes: Negative.    Respiratory: Positive for cough, chest tightness, shortness of breath and wheezing.    Cardiovascular: Negative.    Gastrointestinal: Negative.    Endocrine: Negative.    Genitourinary: Negative.    Musculoskeletal: Positive for arthralgias and back pain.   Allergic/Immunologic: Positive for environmental allergies.   Neurological: Negative.    Hematological: Negative.    Psychiatric/Behavioral: The patient is nervous/anxious.          Objective:     Vital Signs:  /72   Pulse 70   Ht 172.7 cm (68\")   Wt 59 kg (130 lb)   LMP  (LMP Unknown)   SpO2 98%   Breastfeeding No   BMI 19.77 kg/m²     Pulmonary Functions Testing Results:    Results for orders placed during the hospital encounter of 05/03/22    Full Pulmonary Function Test With Bronchodilator    Narrative  [facility] - Pulmonary Function Test    [address] [place] [address]  [id number]  [phone]    Patient : Edel Wilson  MRN : [id number]  CSN : [id number]  Pulmonologist : Keshawn Martin MD  Date : 5/3/2022    ______________________________________________________________________    Interpretation :  1.  Spirometry is consistent with a moderate bordering on severe obstructive ventilatory defect.  2.  There is improvement in spirometry postbronchodilator but a moderate obstructive ventilatory defect is still present.  3.  Lung volumes reveal hyperinflation.      Keshawn Martin MD        Physical Exam  General:  Patient is a 66 y.o. middle aged  female. Looks states age. Appears to be in no acute distress.  Eyes:  EOMI.  PERRLA.  Vision intact.  No scleral icterus.    Ear, Nose, Mouth and Throat:  External inspection of the ears and nose appear to be normal.  No obvious scars or lesions.  Hearing is grossly intact.  No leukoplakia, pharyngitis, stomatitis or thrush. Swollen nasal mucosa with post nasal drip.   Neck:  Range of motion of neck " normal.  No thyromegaly or masses. Malanpati Class 2, no jugular venous distention, no thyroid swelling  Respiratory:  Clear to auscultation bilaterally.  No use of accessory muscles. Decreased breath sounds.      Cardiovascular:  Regularly regular rhythm without S3, S4 or murmur.  No hepatojugular reflux nor carotid bruits.  Pulses intact in four extremities.  No obvious signs of edema.    Gastrointestinal:  Nontender, nondistended, soft.  Bowel sounds positive in all four quadrants.  No organomegaly or masses nor bruit.    Lymphatic:  No significant adenopathy appreciated in the neck, axilla or elsewhere.    Musculoskeletal:  Gait was grossly intact.  Range of motion, strength and sensitivity of all four extremities appear to be intact.  Distal tendon reflexes intact.   Skin:  No obvious rashes, lesions, ulcers or large amount of bruising.    Neurological:  Refer to Eye, Ear, Nose and Throat and musculoskeletal exams for additional details.  Distal tendon reflexes intact.  No clonus or Babinski.  Sensation to touch is grossly intact.    Psychiatric:  Patient is alert and oriented to person, place and time.  Recent and remote memory appear to be intact.  Patient does not show outward signs of depression.    Diagnostic Findings:   Pertinent findings noted and abnormal findings also noted. Reviewed.    Chest Imaging     Study Result  Narrative & Impression   EXAMINATION: XR CHEST PA AND LATERAL-     12/14/2021 10:06 AM CST     HISTORY: Cough > 3 weeks; J20.9-Acute bronchitis, unspecified     2 view chest x-ray.     No comparison.     Heart size is normal.  The mediastinum is within normal limits.      The lungs are mildly hyperexpanded with no pneumonia or pneumothorax.  Mild chronic appearing interstitial disease with a few calcified  granulomas.  No congestive failure changes.                                                                     IMPRESSION:  1. No acute disease.        This report was finalized on  12/14/2021 10:22 by Dr. Natalio Torres MD.     Study Result  Narrative & Impression   EXAMINATION: Chest 2 views 4/21/2022     HISTORY: Shortness of breath and wheezing.     FINDINGS: Today's exam is compared to previous study 12/14/2021. There  are emphysematous changes of the lungs with hyperinflation of the lung  parenchyma. No evidence of consolidative pneumonia or effusion. The  mediastinal contours are within normal limits.     IMPRESSION:  . Emphysematous changes of the lungs. No evidence of lobar  pneumonia or effusion.  This report was finalized on 04/21/2022 11:27 by Dr. David Bahena MD.         Assessment      1. Chronic obstructive pulmonary disease, unspecified COPD type (HCC)    2. Ex-smoker for less than 1 year    3. Anxiety and depression    4. Non-seasonal allergic rhinitis, unspecified trigger    5. SOB (shortness of breath) on exertion    6. Cough    7. Granulomatous lung disease (HCC)    8. Centrilobular emphysema (HCC)        Plan/Recommendations     1.  I reviewed the x-ray of the chest and a pulmonary function test and also explained the results to the patient.  A CT scan noncontrast is ordered over the next clinic visit for underlying her numerous lung disease emphysema and pulmonary scarring and she will need a low-dose CT screening yearly for her history of tobacco abuse.  2.  She is a former smoker and did quit smoking and I encouraged her to continue smoking cessation.  She apparently quit smoking 6 months ago.  3.  Patient is also having allergy problems with nasal drainage and postnasal drip.  Started the patient on fluticasone nasal spray, Astelin nasal spray and also ordered loratadine.  All prescription sent to the pharmacy.  4.  Patient's pulmonary function test reviewed known other pulmonary function test ordered at this time.  She did not need home oxygen because she was oxygenating well in the room air.  5.  Patient will need to return to pulmonary clinic 3 months time for  further follow-up after the noncontrasted scan of the chest.  She can return to the clinic earlier if needed.    Follow Up    3 months    Time Spent   45 minutes      I appreciate the opportunity of participating in this patients care. I would like to thank the PCP for the referral. Please feel free to contact me with any other questions.       Geo Duggan MD   Pulmonologist/Intensivist     09:35 CDT

## 2022-05-24 RX ORDER — ETODOLAC 400 MG/1
400 TABLET, FILM COATED ORAL 2 TIMES DAILY
Qty: 60 TABLET | Refills: 2 | Status: SHIPPED | OUTPATIENT
Start: 2022-05-24 | End: 2022-08-08

## 2022-06-07 ENCOUNTER — OFFICE VISIT (OUTPATIENT)
Dept: INTERNAL MEDICINE | Facility: CLINIC | Age: 67
End: 2022-06-07

## 2022-06-07 VITALS
BODY MASS INDEX: 20.25 KG/M2 | OXYGEN SATURATION: 97 % | WEIGHT: 133.6 LBS | TEMPERATURE: 97.3 F | HEART RATE: 81 BPM | HEIGHT: 68 IN | SYSTOLIC BLOOD PRESSURE: 100 MMHG | DIASTOLIC BLOOD PRESSURE: 70 MMHG

## 2022-06-07 DIAGNOSIS — Z12.4 ENCOUNTER FOR PAPANICOLAOU SMEAR FOR CERVICAL CANCER SCREENING: Primary | ICD-10-CM

## 2022-06-07 DIAGNOSIS — I10 ESSENTIAL HYPERTENSION: ICD-10-CM

## 2022-06-07 DIAGNOSIS — F51.01 PRIMARY INSOMNIA: ICD-10-CM

## 2022-06-07 DIAGNOSIS — E55.9 VITAMIN D DEFICIENCY: ICD-10-CM

## 2022-06-07 PROBLEM — C44.719 BASAL CELL CARCINOMA (BCC) OF LEFT LOWER EXTREMITY: Status: ACTIVE | Noted: 2019-12-10

## 2022-06-07 PROBLEM — M81.0 AGE-RELATED OSTEOPOROSIS WITHOUT CURRENT PATHOLOGICAL FRACTURE: Status: ACTIVE | Noted: 2019-10-29

## 2022-06-07 PROBLEM — F41.9 ANXIETY: Status: ACTIVE | Noted: 2020-05-21

## 2022-06-07 PROCEDURE — 99213 OFFICE O/P EST LOW 20 MIN: CPT

## 2022-06-07 NOTE — PROGRESS NOTES
Subjective   Michelle Wilson is a 66 y.o. female.   Chief Complaint   Patient presents with   • Gynecologic Exam       History of Present Illness   Ms. Ashton is here today for a Papanicolaou exam today. She reports a history of abnormal pap exams in the past many years ago, before menopause. Has never had a hysterectomy.   Sexual activity - none  Symptoms - none.  She does report slight increase in difficulty sleeping, takes xanax prn for this.   She reports that she has stopped smoking for 3 months now and her breathing is better.   The following portions of the patient's history were reviewed and updated as appropriate: allergies, current medications, past family history, past medical history, past social history, past surgical history and problem list.    Review of Systems    Objective   Past Medical History:   Diagnosis Date   • Anxiety    • Arthritis    • Bursitis and tendinitis of shoulder region     Left;  w/ small, partial tear to tendon.   • Cancer (HCC)     skin   • Depression    • HYATT (dyspnea on exertion)    • Family history of early CAD    • Full dentures    • Hypertension    • LAFB (left anterior fascicular block)    • RBBB       Past Surgical History:   Procedure Laterality Date   • BREAST BIOPSY Right    • CHOLECYSTECTOMY  1978   • SKIN CANCER EXCISION  2019   • TUBAL ABDOMINAL LIGATION Bilateral         Current Outpatient Medications:   •  albuterol sulfate  (90 Base) MCG/ACT inhaler, Inhale 2 puffs Every 6 (Six) Hours As Needed for Wheezing. for wheezing, Disp: 18 g, Rfl: 2  •  albuterol sulfate  (90 Base) MCG/ACT inhaler, Inhale 2 puffs Every 4 (Four) Hours As Needed for Wheezing., Disp: 6.7 g, Rfl: 5  •  alendronate (FOSAMAX) 70 MG tablet, TAKE 1 TABLET BY MOUTH EVERY 7 (SEVEN) DAYS., Disp: 12 tablet, Rfl: 3  •  ALPRAZolam XR (XANAX XR) 0.5 MG 24 hr tablet, Take 1 tablet by mouth Every Morning., Disp: 30 tablet, Rfl: 5  •  Azelastine HCl 137 MCG/SPRAY solution, 2 sprays into  "the nostril(s) as directed by provider 2 (Two) Times a Day., Disp: 30 mL, Rfl: 11  •  Calcium Carbonate (CALCIUM 600 PO), Take 1 tablet by mouth Daily., Disp: , Rfl:   •  Cholecalciferol (vitamin D3) 125 MCG (5000 UT) capsule capsule, Take 5,000 Units by mouth Daily., Disp: , Rfl:   •  etodolac (LODINE) 400 MG tablet, Take 1 tablet by mouth 2 (Two) Times a Day., Disp: 60 tablet, Rfl: 2  •  fluticasone (Flonase Allergy Relief) 50 MCG/ACT nasal spray, 2 sprays into the nostril(s) as directed by provider Daily., Disp: 16 g, Rfl: 11  •  Fluticasone-Salmeterol (ADVAIR) 250-50 MCG/ACT DISKUS, Inhale 1 puff 2 (Two) Times a Day., Disp: 60 each, Rfl: 5  •  hydroCHLOROthiazide (HYDRODIURIL) 25 MG tablet, Take 1 tablet by mouth Daily., Disp: 90 tablet, Rfl: 3  •  lisinopril (PRINIVIL,ZESTRIL) 20 MG tablet, Take 1 tablet by mouth Daily., Disp: 90 tablet, Rfl: 3  •  loratadine (CLARITIN) 10 MG tablet, Take 1 tablet by mouth Daily., Disp: 90 tablet, Rfl: 3  •  Multiple Vitamins-Minerals (MULTIVITAMIN ADULT PO), Take 1 tablet by mouth Daily., Disp: , Rfl:   •  multivitamin (THERAGRAN) tablet tablet, Take 1 tablet by mouth Daily., Disp: , Rfl:   •  Omega 3 1200 MG capsule, Take 1 tablet by mouth Daily., Disp: , Rfl:   •  prednisoLONE acetate (PRED FORTE) 1 % ophthalmic suspension, 1 drop As Needed., Disp: , Rfl:   •  rosuvastatin (CRESTOR) 5 MG tablet, Take 1 tablet by mouth Daily., Disp: 90 tablet, Rfl: 3  •  vitamin C (ASCORBIC ACID) 500 MG tablet, Take 500 mg by mouth Daily., Disp: , Rfl:   •  VITAMIN E PO, Take 1 capsule by mouth Daily., Disp: , Rfl:   •  Zinc 50 MG tablet, Take 1 tablet by mouth Daily., Disp: , Rfl:       /70 (BP Location: Left arm, Patient Position: Sitting, Cuff Size: Adult)   Pulse 81   Temp 97.3 °F (36.3 °C) (Temporal)   Ht 172.7 cm (68\")   Wt 60.6 kg (133 lb 9.6 oz)   LMP  (LMP Unknown)   SpO2 97%   Breastfeeding No   BMI 20.31 kg/m²      Body mass index is 20.31 kg/m².  BMI is within normal " parameters. No other follow-up for BMI required.       Physical Exam  Vitals and nursing note reviewed. Exam conducted with a chaperone present.   Constitutional:       Appearance: Normal appearance. She is normal weight.   HENT:      Head: Normocephalic and atraumatic.   Genitourinary:     Exam position: Lithotomy position.      Labia:         Right: Tenderness present. No rash, lesion or injury.         Left: Tenderness present. No rash, lesion or injury.       Urethra: No prolapse, urethral pain, urethral swelling or urethral lesion.      Vagina: Prolapsed vaginal walls present.      Cervix: Friability present. No discharge, lesion, erythema, cervical bleeding or eversion.      Uterus: Normal.       Adnexa: Right adnexa normal and left adnexa normal.      Comments: Vaginal cavity very dry, difficult obtaining cells for cytology   Neurological:      Mental Status: She is alert.               Assessment & Plan   Diagnoses and all orders for this visit:    1. Encounter for Papanicolaou smear for cervical cancer screening (Primary)  -     IGP,rfx Aptima HPV All Pth    2. Essential hypertension  -     CBC w AUTO Differential  -     Comprehensive metabolic panel  -     Lipid panel  -     TSH Rfx On Abnormal To Free T4    3. Vitamin D deficiency  -     Vitamin D 25 hydroxy    4. Primary insomnia  -     TSH Rfx On Abnormal To Free T4               Plan of care reviewed with Ms. Ashton.  Vagina noted to be prolapsed, unsure if enough cells were able to be collected for lab . I have recommended coconut oil for this.   Will repeat labs since it has been 6 months she is fasting today.   Will call with results from pap.  Awesome job with tobacco cessation.   Follow up in 6 months for annual physical, fasting labs prior. Can be seen prior to this as needed.

## 2022-06-08 LAB
25(OH)D3+25(OH)D2 SERPL-MCNC: 76.8 NG/ML (ref 30–100)
ALBUMIN SERPL-MCNC: 4.2 G/DL (ref 3.5–5.2)
ALBUMIN/GLOB SERPL: 1.8 G/DL
ALP SERPL-CCNC: 56 U/L (ref 39–117)
ALT SERPL-CCNC: 19 U/L (ref 1–33)
AST SERPL-CCNC: 20 U/L (ref 1–32)
BASOPHILS # BLD AUTO: 0.06 10*3/MM3 (ref 0–0.2)
BASOPHILS NFR BLD AUTO: 0.7 % (ref 0–1.5)
BILIRUB SERPL-MCNC: 0.5 MG/DL (ref 0–1.2)
BUN SERPL-MCNC: 38 MG/DL (ref 8–23)
BUN/CREAT SERPL: 33.9 (ref 7–25)
CALCIUM SERPL-MCNC: 9.4 MG/DL (ref 8.6–10.5)
CHLORIDE SERPL-SCNC: 104 MMOL/L (ref 98–107)
CHOLEST SERPL-MCNC: 175 MG/DL (ref 0–200)
CO2 SERPL-SCNC: 23.9 MMOL/L (ref 22–29)
CREAT SERPL-MCNC: 1.12 MG/DL (ref 0.57–1)
EGFRCR SERPLBLD CKD-EPI 2021: 54 ML/MIN/1.73
EOSINOPHIL # BLD AUTO: 0.27 10*3/MM3 (ref 0–0.4)
EOSINOPHIL NFR BLD AUTO: 3 % (ref 0.3–6.2)
ERYTHROCYTE [DISTWIDTH] IN BLOOD BY AUTOMATED COUNT: 11.5 % (ref 12.3–15.4)
GLOBULIN SER CALC-MCNC: 2.4 GM/DL
GLUCOSE SERPL-MCNC: 87 MG/DL (ref 65–99)
HCT VFR BLD AUTO: 36.6 % (ref 34–46.6)
HDLC SERPL-MCNC: 124 MG/DL (ref 40–60)
HGB BLD-MCNC: 12.1 G/DL (ref 12–15.9)
IMM GRANULOCYTES # BLD AUTO: 0.09 10*3/MM3 (ref 0–0.05)
IMM GRANULOCYTES NFR BLD AUTO: 1 % (ref 0–0.5)
LDLC SERPL CALC-MCNC: 44 MG/DL (ref 0–100)
LYMPHOCYTES # BLD AUTO: 1.94 10*3/MM3 (ref 0.7–3.1)
LYMPHOCYTES NFR BLD AUTO: 21.3 % (ref 19.6–45.3)
MCH RBC QN AUTO: 32.7 PG (ref 26.6–33)
MCHC RBC AUTO-ENTMCNC: 33.1 G/DL (ref 31.5–35.7)
MCV RBC AUTO: 98.9 FL (ref 79–97)
MONOCYTES # BLD AUTO: 0.78 10*3/MM3 (ref 0.1–0.9)
MONOCYTES NFR BLD AUTO: 8.6 % (ref 5–12)
NEUTROPHILS # BLD AUTO: 5.95 10*3/MM3 (ref 1.7–7)
NEUTROPHILS NFR BLD AUTO: 65.4 % (ref 42.7–76)
NRBC BLD AUTO-RTO: 0 /100 WBC (ref 0–0.2)
PLATELET # BLD AUTO: 347 10*3/MM3 (ref 140–450)
POTASSIUM SERPL-SCNC: 4.6 MMOL/L (ref 3.5–5.2)
PROT SERPL-MCNC: 6.6 G/DL (ref 6–8.5)
RBC # BLD AUTO: 3.7 10*6/MM3 (ref 3.77–5.28)
SODIUM SERPL-SCNC: 138 MMOL/L (ref 136–145)
TRIGL SERPL-MCNC: 27 MG/DL (ref 0–150)
TSH SERPL DL<=0.005 MIU/L-ACNC: 0.86 UIU/ML (ref 0.27–4.2)
VLDLC SERPL CALC-MCNC: 7 MG/DL (ref 5–40)
WBC # BLD AUTO: 9.09 10*3/MM3 (ref 3.4–10.8)

## 2022-06-09 DIAGNOSIS — R79.89 ABNORMAL BLOOD CREATININE LEVEL: Primary | ICD-10-CM

## 2022-06-09 LAB
CONV .: NORMAL
CYTOLOGIST CVX/VAG CYTO: NORMAL
CYTOLOGY CVX/VAG DOC CYTO: NORMAL
CYTOLOGY CVX/VAG DOC THIN PREP: NORMAL
DX ICD CODE: NORMAL
HIV 1 & 2 AB SER-IMP: NORMAL
Lab: NORMAL
OTHER STN SPEC: NORMAL
STAT OF ADQ CVX/VAG CYTO-IMP: NORMAL

## 2022-06-09 NOTE — PROGRESS NOTES
Slightly anemic, I would encourage her to increase her intake of green leafy vegetables and red meat, also noted is elevated BUN and creatinine, both slight however could be related to dehydration and not drinking enough water, I would encourage her to drink at least 8 glasses with 8 ounces of water daily and I would like this rechecked in 4 weeks.  Cholesterol and vitamin D are both within normal limits.

## 2022-06-09 NOTE — PROGRESS NOTES
We did gather enough cells on Pap, results were negative for any intraepithelial lesion or malignancy.

## 2022-06-27 DIAGNOSIS — M81.0 AGE-RELATED OSTEOPOROSIS WITHOUT CURRENT PATHOLOGICAL FRACTURE: Primary | ICD-10-CM

## 2022-06-27 RX ORDER — ALENDRONATE SODIUM 70 MG/1
70 TABLET ORAL
Qty: 12 TABLET | Refills: 3 | Status: SHIPPED | OUTPATIENT
Start: 2022-06-27

## 2022-08-08 ENCOUNTER — HOSPITAL ENCOUNTER (OUTPATIENT)
Dept: CT IMAGING | Facility: HOSPITAL | Age: 67
Discharge: HOME | End: 2022-08-08
Admitting: INTERNAL MEDICINE

## 2022-08-08 DIAGNOSIS — J43.2 CENTRILOBULAR EMPHYSEMA: ICD-10-CM

## 2022-08-08 DIAGNOSIS — J84.10 GRANULOMATOUS LUNG DISEASE: ICD-10-CM

## 2022-08-08 PROCEDURE — 71250 CT THORAX DX C-: CPT

## 2022-08-08 RX ORDER — ETODOLAC 400 MG/1
TABLET, FILM COATED ORAL
Qty: 60 TABLET | Refills: 2 | Status: SHIPPED | OUTPATIENT
Start: 2022-08-08

## 2022-08-09 DIAGNOSIS — R91.1 LUNG NODULE: Primary | ICD-10-CM

## 2022-08-10 ENCOUNTER — OFFICE VISIT (OUTPATIENT)
Dept: PULMONOLOGY | Facility: CLINIC | Age: 67
End: 2022-08-10

## 2022-08-10 VITALS
SYSTOLIC BLOOD PRESSURE: 122 MMHG | BODY MASS INDEX: 19.85 KG/M2 | HEART RATE: 79 BPM | WEIGHT: 131 LBS | HEIGHT: 68 IN | OXYGEN SATURATION: 99 % | DIASTOLIC BLOOD PRESSURE: 78 MMHG

## 2022-08-10 DIAGNOSIS — J44.9 COPD, MODERATE: ICD-10-CM

## 2022-08-10 DIAGNOSIS — J84.10 GRANULOMATOUS LUNG DISEASE: ICD-10-CM

## 2022-08-10 DIAGNOSIS — J43.2 CENTRILOBULAR EMPHYSEMA: ICD-10-CM

## 2022-08-10 DIAGNOSIS — Z87.891 PERSONAL HISTORY OF NICOTINE DEPENDENCE: ICD-10-CM

## 2022-08-10 DIAGNOSIS — R91.1 LUNG NODULE: Primary | ICD-10-CM

## 2022-08-10 PROCEDURE — 99214 OFFICE O/P EST MOD 30 MIN: CPT | Performed by: INTERNAL MEDICINE

## 2022-08-10 NOTE — PATIENT INSTRUCTIONS
The patient's screening chest CT did show an 8 mm nodule.  I reviewed the scan with her.  Dr. Duggan had initially seen her in May and actually scheduled that scan and has already scheduled a PET scan.  I did review the CT scan with her and also demonstrated the size of the nodule on our office lung nodule model.  I will see her back shortly after the PET scan is performed.  Will determine need for further work-up based on results of the scan.  She is doing well with her Advair discus and is not having to use her rescue inhaler at this time.  I will get an alpha-1 study and again we will see her back in a few weeks after her PET scan has been performed.

## 2022-08-10 NOTE — ASSESSMENT & PLAN NOTE
This was noted on a screening chest CT done earlier this week.  A PET scan has been ordered.  I will see her back shortly after the PET scan is performed to plan further work-up.

## 2022-08-10 NOTE — PROGRESS NOTES
Chief Complaint  COPD and Lung Nodule    Subjective    History of Present Illness     Michelle Wilson presents to [facility] for COPD and a lung nodule noted on a screening chest CT.    History of Present Illness   The patient had been seen by Dr. Duggan a few months ago regarding COPD.  She has a long smoking history but quit smoking sometime in late February early March.  She has noticed improvement in her respiratory status since that time.  She is on Wixela and as needed albuterol but has not had to use her albuterol recently.  A screening CT was performed on Monday and did show a spiculated left lung nodule 8 mm in size.  I did review the scan with the patient today and demonstrated the nodule and also demonstrated the size of the nodule on the office lung nodule model.  Dr. Duggan actually received that information guarding the CT result initially as he had ordered the study and has already scheduled her for a PET scan.  Again I went over the scan itself with the patient and the plan for the PET scan.  She has some apical scarring and hopefully that nodule represents an area of scarring as well but again we will get the PET scan then and see her back shortly thereafter.  If the PET scan were negative I would just recommend serial imaging studies.  If positive in any invasive work-up are going to be considered the options would be navigational bronchoscopy assuming no other areas of activity were noted that might be more accessible to biopsy, versus a surgical referral.  If we were going to consider surgery I would recommend follow-up pulmonary functions.  Her last PFTs did show moderate obstructive ventilatory defect even postbronchodilator with an FEV1 postbronchodilator of 1.51 L.  She has significant emphysematous changes on her chest CT and did not have diffusion capacity performed on those studies so can I would get follow-up complete pulmonary  functions to include diffusion capacity if we were going to consider a surgical approach.  Also in view of the emphysematous changes noted on her chest CT I will obtain an alpha-1 study today and call her with results.  She has had her COVID-19 vaccine including 2 boosters in the form of the Moderna vaccine, she had her flu shot this past flu season, and has had both a Prevnar 13 and a Pneumovax.  Prior to Admission medications    Medication Sig Start Date End Date Taking? Authorizing Provider   albuterol sulfate  (90 Base) MCG/ACT inhaler Inhale 2 puffs Every 6 (Six) Hours As Needed for Wheezing. for wheezing 4/21/22  Yes Chery Montejo APRN   albuterol sulfate  (90 Base) MCG/ACT inhaler Inhale 2 puffs Every 4 (Four) Hours As Needed for Wheezing. 5/6/22  Yes Geo Duggan MD   alendronate (FOSAMAX) 70 MG tablet Take 1 tablet by mouth Every 7 (Seven) Days. 6/27/22  Yes Krissy Cardoso APRN   ALPRAZolam XR (XANAX XR) 0.5 MG 24 hr tablet Take 1 tablet by mouth Every Morning. 4/21/22  Yes Chery Montejo APRN   Azelastine HCl 137 MCG/SPRAY solution 2 sprays into the nostril(s) as directed by provider 2 (Two) Times a Day. 5/6/22  Yes Geo Duggan MD   Calcium Carbonate (CALCIUM 600 PO) Take 1 tablet by mouth Daily.   Yes ProviderSabrina MD   Cholecalciferol (vitamin D3) 125 MCG (5000 UT) capsule capsule Take 5,000 Units by mouth Daily.   Yes Sabrina Garcia MD   etodolac (LODINE) 400 MG tablet TAKE 1 TABLET BY MOUTH TWICE A DAY 8/8/22  Yes Leydi Mon APRN   fluticasone (Flonase Allergy Relief) 50 MCG/ACT nasal spray 2 sprays into the nostril(s) as directed by provider Daily. 5/6/22  Yes Geo Duggan MD   Fluticasone-Salmeterol (ADVAIR) 250-50 MCG/ACT DISKUS Inhale 1 puff 2 (Two) Times a Day. 5/6/22  Yes Geo Duggan MD   hydroCHLOROthiazide (HYDRODIURIL) 25 MG tablet Take 1 tablet by mouth Daily. 7/2/21  Yes Silvia Servin APRN   lisinopril  "(PRINIVIL,ZESTRIL) 20 MG tablet Take 1 tablet by mouth Daily. 21  Yes Silvia Servin APRN   loratadine (CLARITIN) 10 MG tablet Take 1 tablet by mouth Daily. 22  Yes Geo Duggan MD   Multiple Vitamins-Minerals (MULTIVITAMIN ADULT PO) Take 1 tablet by mouth Daily.   Yes Sabrina Garcia MD   multivitamin (THERAGRAN) tablet tablet Take 1 tablet by mouth Daily.   Yes Sabrina Garcia MD   Omega 3 1200 MG capsule Take 1 tablet by mouth Daily.   Yes Sabrina Garcia MD   prednisoLONE acetate (PRED FORTE) 1 % ophthalmic suspension 1 drop As Needed.   Yes Sabrina Garcia MD   rosuvastatin (CRESTOR) 5 MG tablet Take 1 tablet by mouth Daily. 22  Yes Giovanni Lino MD   vitamin C (ASCORBIC ACID) 500 MG tablet Take 500 mg by mouth Daily.   Yes Sabrina Garcia MD   VITAMIN E PO Take 1 capsule by mouth Daily.   Yes Sabrina Garcia MD   Zinc 50 MG tablet Take 1 tablet by mouth Daily.   Yes Sabrina Garcia MD       Social History     Socioeconomic History   • Marital status:    Tobacco Use   • Smoking status: Former Smoker     Packs/day: 0.25     Years: 49.00     Pack years: 12.25     Types: Cigarettes     Start date:      Quit date: 3/9/2022     Years since quittin.4   • Smokeless tobacco: Never Used   Vaping Use   • Vaping Use: Never used   Substance and Sexual Activity   • Alcohol use: Not Currently     Comment: occ   • Drug use: Never   • Sexual activity: Not Currently       Objective   Vital Signs:   /78   Pulse 79   Ht 172.7 cm (68\")   Wt 59.4 kg (131 lb)   SpO2 99% Comment: RA  BMI 19.92 kg/m²     Physical Exam  Vitals and nursing note reviewed.   HENT:      Head: Normocephalic.      Comments: She is wearing a mask.  Eyes:      Extraocular Movements: Extraocular movements intact.      Pupils: Pupils are equal, round, and reactive to light.   Cardiovascular:      Rate and Rhythm: Normal rate and regular rhythm.   Pulmonary:      Effort: " Pulmonary effort is normal.      Comments: She has reasonable air movement bilaterally with no adventitious sounds heard.  Musculoskeletal:         General: Normal range of motion.   Skin:     General: Skin is warm and dry.   Neurological:      General: No focal deficit present.      Mental Status: She is alert and oriented to person, place, and time.   Psychiatric:         Mood and Affect: Mood normal.         Behavior: Behavior normal.        Result Review :          Results for orders placed during the hospital encounter of 05/03/22    Full Pulmonary Function Test With Bronchodilator    Narrative  [facility] - Pulmonary Function Test    [address] [facility]  [place]  [address]  [phone]    Patient : Edel Wilson  MRN : [id number]  CSN : [id number]  Pulmonologist : Keshawn Martin MD  Date : 5/3/2022    ______________________________________________________________________    Interpretation :  1.  Spirometry is consistent with a moderate bordering on severe obstructive ventilatory defect.  2.  There is improvement in spirometry postbronchodilator but a moderate obstructive ventilatory defect is still present.  3.  Lung volumes reveal hyperinflation.      Keshawn Martin MD                 My interpretation of imaging:    CT Chest Without Contrast Diagnostic (08/08/2022 11:51)        Assessment and Plan     Diagnoses and all orders for this visit:    1. Lung nodule (Primary)  Assessment & Plan:  This was noted on a screening chest CT done earlier this week.  A PET scan has been ordered.  I will see her back shortly after the PET scan is performed to plan further work-up.      2. Centrilobular emphysema (HCC)  Assessment & Plan:  This was noted on her recent chest CT.  I will get an alpha-1 study.        Orders:  -     Alpha - 1 - Antitrypsin; Future    3. Granulomatous lung disease (HCC)  Assessment & Plan:  She has some apical scarring noted on her chest CT done earlier this week which  certainly could relate to old granulomatous disease.  Hopefully the lung nodule also is due to scarring from old granulomatous disease but again a PET scan will be performed.      4. COPD, moderate (HCC)  Assessment & Plan:  She is doing well on her Wixela and is not requiring her rescue inhaler at present.  I will get an alpha-1 study.          5. Personal history of nicotine dependence  Assessment & Plan:  She quit smoking several months ago.            Keshawn Martin MD  8/10/2022  12:35 CDT    Follow Up   No follow-ups on file.    Patient was given instructions and counseling regarding her condition or for health maintenance advice. Please see specific information pulled into the AVS if appropriate.  Once the date of her PET scan has been determined I will then schedule her for a follow-up appointment shortly thereafter.  Again I did discuss possible plans for further work-up depending on the results of the PET scan.  If it were negative we would just continue serial imaging studies.  If positive we will discuss biopsy procedure and that could range from navigational bronchoscopy in [place] to possible evaluation by thoracic surgery.  Again if surgery were going to be considered I would get follow-up PFTs to include a diffusion capacity.

## 2022-08-10 NOTE — ASSESSMENT & PLAN NOTE
She is doing well on her Wixela and is not requiring her rescue inhaler at present.  I will get an alpha-1 study.

## 2022-08-10 NOTE — ASSESSMENT & PLAN NOTE
She has some apical scarring noted on her chest CT done earlier this week which certainly could relate to old granulomatous disease.  Hopefully the lung nodule also is due to scarring from old granulomatous disease but again a PET scan will be performed.

## 2022-08-17 ENCOUNTER — HOSPITAL ENCOUNTER (OUTPATIENT)
Dept: CT IMAGING | Facility: HOSPITAL | Age: 67
Discharge: HOME | End: 2022-08-17

## 2022-08-17 DIAGNOSIS — R91.1 LUNG NODULE: ICD-10-CM

## 2022-08-17 PROCEDURE — A9552 F18 FDG: HCPCS | Performed by: INTERNAL MEDICINE

## 2022-08-17 PROCEDURE — 0 FLUDEOXYGLUCOSE F18 SOLUTION: Performed by: INTERNAL MEDICINE

## 2022-08-17 PROCEDURE — 78815 PET IMAGE W/CT SKULL-THIGH: CPT

## 2022-08-17 RX ADMIN — FLUDEOXYGLUCOSE F18 1 DOSE: 300 INJECTION INTRAVENOUS at 10:18

## 2022-08-24 ENCOUNTER — OFFICE VISIT (OUTPATIENT)
Dept: PULMONOLOGY | Facility: CLINIC | Age: 67
End: 2022-08-24

## 2022-08-24 VITALS
OXYGEN SATURATION: 99 % | HEIGHT: 68 IN | HEART RATE: 85 BPM | WEIGHT: 130 LBS | DIASTOLIC BLOOD PRESSURE: 78 MMHG | BODY MASS INDEX: 19.7 KG/M2 | SYSTOLIC BLOOD PRESSURE: 112 MMHG

## 2022-08-24 DIAGNOSIS — R91.1 LUNG NODULE: Primary | ICD-10-CM

## 2022-08-24 DIAGNOSIS — Z87.891 PERSONAL HISTORY OF NICOTINE DEPENDENCE: ICD-10-CM

## 2022-08-24 DIAGNOSIS — J43.2 CENTRILOBULAR EMPHYSEMA: ICD-10-CM

## 2022-08-24 DIAGNOSIS — J44.9 COPD, MODERATE: ICD-10-CM

## 2022-08-24 PROCEDURE — 99214 OFFICE O/P EST MOD 30 MIN: CPT | Performed by: INTERNAL MEDICINE

## 2022-08-24 NOTE — ASSESSMENT & PLAN NOTE
This has been noted on her previous chest CT and is associated with her COPD.  Her alpha-1 study is pending.

## 2022-08-24 NOTE — PATIENT INSTRUCTIONS
The patient's PET scan did not show any significant activity in her left lung nodule.  I reviewed the scan with her.  I will plan on a follow-up visit in mid November with a repeat CT on the day of her return visit.

## 2022-08-24 NOTE — ASSESSMENT & PLAN NOTE
There is no increase activity noted in this nodule on her PET scan done yesterday.  I will repeat a standard chest CT in about 3 months.

## 2022-08-24 NOTE — PROGRESS NOTES
Chief Complaint  Lung Nodule    Subjective    History of Present Illness     Michelle Wilson presents to [facility] for a lung nodule.    History of Present Illness   The patient's PET scan did not show any activity of significance in her left lung nodule.  I did tell her we will still get a follow-up CT in about 3 months.  There was some mild uptake in the distal esophagus suggestive of possible sequela of reflux esophagitis without any evidence of a mass lesion.  I actually called her with these results yesterday.  Again the plan will be to get a follow-up chest CT in about 3 months.  She has had the COVID-19 vaccine including 2 boosters in the form of the Moderna vaccine.  She did have the flu shot this past flu season and has had both a Prevnar 13 and a Pneumovax.  Prior to Admission medications    Medication Sig Start Date End Date Taking? Authorizing Provider   albuterol sulfate  (90 Base) MCG/ACT inhaler Inhale 2 puffs Every 6 (Six) Hours As Needed for Wheezing. for wheezing 4/21/22  Yes Chery Montejo APRN   albuterol sulfate  (90 Base) MCG/ACT inhaler Inhale 2 puffs Every 4 (Four) Hours As Needed for Wheezing. 5/6/22  Yes Geo Duggan MD   alendronate (FOSAMAX) 70 MG tablet Take 1 tablet by mouth Every 7 (Seven) Days. 6/27/22  Yes Krissy Cardoso APRN   ALPRAZolam XR (XANAX XR) 0.5 MG 24 hr tablet Take 1 tablet by mouth Every Morning. 4/21/22  Yes Chery Montejo APRN   Azelastine HCl 137 MCG/SPRAY solution 2 sprays into the nostril(s) as directed by provider 2 (Two) Times a Day. 5/6/22  Yes Geo Duggan MD   Calcium Carbonate (CALCIUM 600 PO) Take 1 tablet by mouth Daily.   Yes Sabrina Garcia MD   Cholecalciferol (vitamin D3) 125 MCG (5000 UT) capsule capsule Take 5,000 Units by mouth Daily.   Yes Sabrina Garcia MD   etodolac (LODINE) 400 MG tablet TAKE 1 TABLET BY MOUTH TWICE A DAY 8/8/22  Yes  Leydi Mon APRN   fluticasone (Flonase Allergy Relief) 50 MCG/ACT nasal spray 2 sprays into the nostril(s) as directed by provider Daily. 22  Yes Geo Duggan MD   Fluticasone-Salmeterol (ADVAIR) 250-50 MCG/ACT DISKUS Inhale 1 puff 2 (Two) Times a Day. 22  Yes Geo Duggan MD   hydroCHLOROthiazide (HYDRODIURIL) 25 MG tablet Take 1 tablet by mouth Daily. 21  Yes Silvia Servin APRN   lisinopril (PRINIVIL,ZESTRIL) 20 MG tablet Take 1 tablet by mouth Daily. 21  Yes Silvia Servin APRN   loratadine (CLARITIN) 10 MG tablet Take 1 tablet by mouth Daily. 22  Yes Geo Duggan MD   Multiple Vitamins-Minerals (MULTIVITAMIN ADULT PO) Take 1 tablet by mouth Daily.   Yes Sabrina Garcia MD   multivitamin (THERAGRAN) tablet tablet Take 1 tablet by mouth Daily.   Yes Sabrina Garcia MD   Omega 3 1200 MG capsule Take 1 tablet by mouth Daily.   Yes Sabrina Garcia MD   prednisoLONE acetate (PRED FORTE) 1 % ophthalmic suspension 1 drop As Needed.   Yes Sabrina Garcia MD   rosuvastatin (CRESTOR) 5 MG tablet Take 1 tablet by mouth Daily. 22  Yes Giovanni Lino MD   vitamin C (ASCORBIC ACID) 500 MG tablet Take 500 mg by mouth Daily.   Yes Sabrina Garcia MD   VITAMIN E PO Take 1 capsule by mouth Daily.   Yes Sabrina Garcia MD   Zinc 50 MG tablet Take 1 tablet by mouth Daily.   Yes Sabrina Garcia MD       Social History     Socioeconomic History   • Marital status:    Tobacco Use   • Smoking status: Former Smoker     Packs/day: 0.25     Years: 49.00     Pack years: 12.25     Types: Cigarettes     Start date:      Quit date: 3/9/2022     Years since quittin.4   • Smokeless tobacco: Never Used   Vaping Use   • Vaping Use: Never used   Substance and Sexual Activity   • Alcohol use: Not Currently     Comment: occ   • Drug use: Never   • Sexual activity: Not Currently       Objective   Vital Signs:   /78   Pulse 85   Ht 172.7  "cm (68\")   Wt 59 kg (130 lb)   SpO2 99% Comment: RA  BMI 19.77 kg/m²     Physical Exam  Vitals and nursing note reviewed.   HENT:      Head: Normocephalic.      Comments: She is wearing a mask.  Eyes:      Extraocular Movements: Extraocular movements intact.      Pupils: Pupils are equal, round, and reactive to light.   Cardiovascular:      Rate and Rhythm: Normal rate and regular rhythm.   Pulmonary:      Effort: Pulmonary effort is normal.      Comments: She has fair air movement bilaterally with no adventitious sounds heard.  Musculoskeletal:         General: Normal range of motion.   Skin:     General: Skin is warm and dry.   Neurological:      General: No focal deficit present.      Mental Status: She is alert and oriented to person, place, and time.   Psychiatric:         Mood and Affect: Mood normal.         Behavior: Behavior normal.        Result Review :          Results for orders placed during the hospital encounter of 05/03/22    Full Pulmonary Function Test With Bronchodilator    Narrative  [facility] - Pulmonary Function Test    [address] [place] [address]  [address]  [phone]    Patient : Edel Wilson  MRN : [id number]  CSN : [id number]  Pulmonologist : Keshawn Martin MD  Date : 5/3/2022    ______________________________________________________________________    Interpretation :  1.  Spirometry is consistent with a moderate bordering on severe obstructive ventilatory defect.  2.  There is improvement in spirometry postbronchodilator but a moderate obstructive ventilatory defect is still present.  3.  Lung volumes reveal hyperinflation.      Keshawn Martin MD                 My interpretation of imaging:    NM PET/CT Skull Base to Mid Thigh (08/17/2022 12:00)        Assessment and Plan     Diagnoses and all orders for this visit:    1. Lung nodule (Primary)  Assessment & Plan:  There is no increase activity noted in this nodule on her PET scan done yesterday.  I will " repeat a standard chest CT in about 3 months.    Orders:  -     CT Chest Without Contrast Diagnostic; Future    2. COPD, moderate (HCC)  Assessment & Plan:  She will continue her Wixela and hub and her as needed albuterol HFA.          3. Centrilobular emphysema (HCC)  Assessment & Plan:  This has been noted on her previous chest CT and is associated with her COPD.  Her alpha-1 study is pending.          4. Personal history of nicotine dependence  Assessment & Plan:  She quit smoking this past March.            Keshawn Martin MD  8/24/2022  17:54 CDT    Follow Up   Return in about 12 weeks (around 11/16/2022) for To see me specifically.    Patient was given instructions and counseling regarding her condition or for health maintenance advice. Please see specific information pulled into the AVS if appropriate.

## 2022-09-11 ENCOUNTER — TELEPHONE (OUTPATIENT)
Dept: URGENT CARE | Facility: CLINIC | Age: 67
End: 2022-09-11

## 2022-09-11 DIAGNOSIS — U07.1 COVID-19: Primary | ICD-10-CM

## 2022-09-11 PROCEDURE — 87635 SARS-COV-2 COVID-19 AMP PRB: CPT | Performed by: NURSE PRACTITIONER

## 2022-09-29 RX ORDER — LISINOPRIL 20 MG/1
20 TABLET ORAL DAILY
Qty: 90 TABLET | Refills: 3 | Status: SHIPPED | OUTPATIENT
Start: 2022-09-29

## 2022-09-29 NOTE — TELEPHONE ENCOUNTER
Caller: Michelle Wilson    Relationship: Self    Best call back number:  [phone] (H)     Requested Prescriptions:   Requested Prescriptions     Pending Prescriptions Disp Refills   • lisinopril (PRINIVIL,ZESTRIL) 20 MG tablet 90 tablet 3     Sig: Take 1 tablet by mouth Daily.        Pharmacy where request should be sent: [id number] [place] [place] [facility] [address] [facility] [address] [phone] [phone]      Additional details provided by patient: completely out     Does the patient have less than a 3 day supply:  [x] Yes  [] No    Vanessa Jarvis Rep   09/29/22 10:14 CDT

## 2022-10-12 NOTE — PROGRESS NOTES
Subjective:     Encounter Date: 10/13/2022      Patient ID: Michelle Wilson is a 67 y.o. female   HPI: This patient presents today for routine follow-up.  She was last seen in our office on 4/13/2022 at which time bilateral carotid artery ultrasound was ordered.  This was completed on 4/15/2022 and revealed less than 50% stenosis of bilateral internal carotid arteries.  She has hypertension, left anterior fascicular block, right bundle branch block, anxiety, depression, skin cancer and former tobacco use. She reports some shortness of breath in the mornings when first getting out of bed. Patient denies chest pain, palpitations, dizziness, syncope, orthopnea, PND, edema or decreased stamina.  Patient denies any signs of bleeding.    Chief Complaint: Routine follow-up  Hypertension  This is a chronic problem. The current episode started more than 1 year ago. The problem is controlled. Pertinent negatives include no anxiety, blurred vision, chest pain, headaches, malaise/fatigue, neck pain, orthopnea, palpitations, peripheral edema, PND, shortness of breath or sweats. Risk factors for coronary artery disease include post-menopausal state. Current antihypertension treatment includes ACE inhibitors and diuretics. The current treatment provides significant improvement.     I have reviewed and confirmed the accuracy of the ROS as documented by the MA/LPN/RN LUAN Anderson      Previous Cardiac Testing:  Results for orders placed during the hospital encounter of 09/25/20    Adult Transthoracic Echo Complete W/ Cont if Necessary Per Protocol    Interpretation Summary  · Left ventricular ejection fraction appears to be 61 - 65%. Left ventricular systolic function is normal.  · Left ventricular diastolic function was normal  · Estimated right ventricular systolic pressure from tricuspid regurgitation is normal (<35 mmHg).        The following portions of the patient's history were reviewed and updated as  appropriate: allergies, current medications, past family history, past medical history, past social history, past surgical history and problem list.    No Known Allergies    Current Outpatient Medications:   •  albuterol sulfate  (90 Base) MCG/ACT inhaler, Inhale 2 puffs Every 4 (Four) Hours As Needed for Wheezing., Disp: 6.7 g, Rfl: 5  •  alendronate (FOSAMAX) 70 MG tablet, Take 1 tablet by mouth Every 7 (Seven) Days., Disp: 12 tablet, Rfl: 3  •  ALPRAZolam XR (XANAX XR) 0.5 MG 24 hr tablet, Take 1 tablet by mouth Every Morning., Disp: 30 tablet, Rfl: 5  •  aspirin 81 MG EC tablet, Take 1 tablet by mouth Daily., Disp: , Rfl:   •  Azelastine HCl 137 MCG/SPRAY solution, 2 sprays into the nostril(s) as directed by provider 2 (Two) Times a Day., Disp: 30 mL, Rfl: 11  •  Calcium Carbonate (CALCIUM 600 PO), Take 1 tablet by mouth Daily., Disp: , Rfl:   •  Cholecalciferol (vitamin D3) 125 MCG (5000 UT) capsule capsule, Take 5,000 Units by mouth Daily., Disp: , Rfl:   •  etodolac (LODINE) 400 MG tablet, TAKE 1 TABLET BY MOUTH TWICE A DAY, Disp: 60 tablet, Rfl: 2  •  fluticasone (Flonase Allergy Relief) 50 MCG/ACT nasal spray, 2 sprays into the nostril(s) as directed by provider Daily., Disp: 16 g, Rfl: 11  •  hydroCHLOROthiazide (HYDRODIURIL) 25 MG tablet, Take 1 tablet by mouth Daily., Disp: 90 tablet, Rfl: 3  •  lisinopril (PRINIVIL,ZESTRIL) 20 MG tablet, Take 1 tablet by mouth Daily., Disp: 90 tablet, Rfl: 3  •  loratadine (CLARITIN) 10 MG tablet, Take 1 tablet by mouth Daily., Disp: 90 tablet, Rfl: 3  •  Multiple Vitamins-Minerals (MULTIVITAMIN ADULT PO), Take 1 tablet by mouth Daily., Disp: , Rfl:   •  Omega 3 1200 MG capsule, Take 1 tablet by mouth Daily., Disp: , Rfl:   •  prednisoLONE acetate (PRED FORTE) 1 % ophthalmic suspension, 1 drop As Needed., Disp: , Rfl:   •  rosuvastatin (CRESTOR) 5 MG tablet, Take 1 tablet by mouth Daily., Disp: 90 tablet, Rfl: 3  •  vitamin C (ASCORBIC ACID) 500 MG tablet, Take  500 mg by mouth Daily., Disp: , Rfl:   •  VITAMIN E PO, Take 1 capsule by mouth Daily., Disp: , Rfl:   •  Wixela Inhub 250-50 MCG/ACT DISKUS, INHALE 1 PUFF BY MOUTH TWICE DAILY, Disp: 60 each, Rfl: 5  •  Zinc 50 MG tablet, Take 1 tablet by mouth Daily., Disp: , Rfl:   Past Medical History:   Diagnosis Date   • Anxiety    • Arthritis    • Bursitis and tendinitis of shoulder region     Left;  w/ small, partial tear to tendon.   • Cancer (HCC)     skin   • Depression    • HYATT (dyspnea on exertion)    • Family history of early CAD    • Full dentures    • Hypertension    • LAFB (left anterior fascicular block)    • RBBB      Past Surgical History:   Procedure Laterality Date   • BREAST BIOPSY Right    • CHOLECYSTECTOMY     • SKIN CANCER EXCISION     • TUBAL ABDOMINAL LIGATION Bilateral      Family History   Problem Relation Age of Onset   • Arthritis Mother    • Hypertension Mother    • Depression Mother    • Cancer Maternal Grandmother    • Arthritis Paternal Grandmother    • Cancer Paternal Grandmother    • Breast cancer Paternal Grandmother    • Cancer Other      Social History     Socioeconomic History   • Marital status:    Tobacco Use   • Smoking status: Former     Packs/day: 0.25     Years: 49.00     Pack years: 12.25     Types: Cigarettes     Start date:      Quit date: 3/9/2022     Years since quittin.5   • Smokeless tobacco: Never   Vaping Use   • Vaping Use: Never used   Substance and Sexual Activity   • Alcohol use: Not Currently     Comment: occ   • Drug use: Never   • Sexual activity: Not Currently       Review of Systems   Constitutional: Negative for chills, decreased appetite, fever, malaise/fatigue, night sweats, weight gain and weight loss.   HENT: Negative for nosebleeds.    Eyes: Negative for blurred vision and visual disturbance.   Cardiovascular: Negative for chest pain, dyspnea on exertion, leg swelling, near-syncope, orthopnea, palpitations, paroxysmal nocturnal dyspnea  and syncope.   Respiratory: Negative for cough, hemoptysis, shortness of breath, snoring and wheezing.    Endocrine: Negative for cold intolerance and heat intolerance.   Hematologic/Lymphatic: Does not bruise/bleed easily.   Skin: Negative for rash.   Musculoskeletal: Negative for back pain, falls and neck pain.   Gastrointestinal: Negative for abdominal pain, change in bowel habit, constipation, diarrhea, dysphagia, heartburn, nausea and vomiting.   Genitourinary: Negative for hematuria.   Neurological: Negative for dizziness, headaches, light-headedness and weakness.   Psychiatric/Behavioral: Negative for altered mental status.   Allergic/Immunologic: Negative for persistent infections.       I have reviewed and confirmed the accuracy of the ROS  LUAN Anderson           Objective:     Vitals and nursing note reviewed.   Constitutional:       General: Not in acute distress.     Appearance: Normal and healthy appearance. Well-developed, normal weight and not in distress. Not diaphoretic.   Eyes:      General: Lids are normal.         Right eye: No discharge.         Left eye: No discharge.      Conjunctiva/sclera: Conjunctivae normal.      Pupils: Pupils are equal, round, and reactive to light.   HENT:      Head: Normocephalic and atraumatic.      Jaw: There is normal jaw occlusion.      Right Ear: External ear normal.      Left Ear: External ear normal.      Nose: Nose normal.   Neck:      Thyroid: No thyromegaly.      Vascular: No carotid bruit, JVD or JVR. JVD normal.      Trachea: Trachea normal. No tracheal deviation.   Pulmonary:      Effort: Pulmonary effort is normal. No respiratory distress.      Breath sounds: No decreased breath sounds. No wheezing. No rhonchi. No rales.   Chest:      Chest wall: Not tender to palpatation.   Cardiovascular:      PMI at left midclavicular line. Normal rate. Regular rhythm. Normal S1. Normal S2.      Murmurs: There is no murmur.      No gallop. No click. No rub.  "  Pulses:     Intact distal pulses. No decreased pulses.   Edema:     Peripheral edema absent.   Abdominal:      General: Bowel sounds are normal. There is no distension.      Palpations: Abdomen is soft.      Tenderness: There is no abdominal tenderness.   Musculoskeletal: Normal range of motion.         General: No tenderness or deformity.      Cervical back: Normal range of motion and neck supple. Skin:     General: Skin is warm and dry.      Coloration: Skin is not pale.      Findings: No erythema or rash.   Neurological:      General: No focal deficit present.      Mental Status: Alert, oriented to person, place, and time and oriented to person, place and time.   Psychiatric:         Attention and Perception: Attention and perception normal.         Mood and Affect: Mood and affect normal.         Speech: Speech normal.         Behavior: Behavior normal.         Thought Content: Thought content normal.         Cognition and Memory: Cognition and memory normal.         Judgment: Judgment normal.             ECG 12 Lead    Date/Time: 10/13/2022 10:28 AM  Performed by: Jessica Magana APRN  Authorized by: Jessica Magana APRN   Comparison: compared with previous ECG from 4/13/2022  Similar to previous ECG  Rhythm: sinus rhythm  Rate: normal  BPM: 67  Conduction: right bundle branch block and left anterior fascicular block  T inversion: aVL    Clinical impression: abnormal EKG          /82   Pulse 67   Ht 172.7 cm (68\")   Wt 57.6 kg (127 lb)   LMP  (LMP Unknown)   SpO2 100%   BMI 19.31 kg/m²   Lab Review:   Lab Results   Component Value Date    WBC 9.09 06/07/2022    HGB 12.1 06/07/2022    HCT 36.6 06/07/2022    MCV 98.9 (H) 06/07/2022     06/07/2022     Lab Results   Component Value Date    GLUCOSE 95 07/07/2022    BUN 19 07/07/2022    CREATININE 0.99 07/07/2022    EGFRIFNONA 57 (L) 11/24/2021    EGFRIFAFRI 69 11/24/2021    BCR 19.2 07/07/2022    K 5.0 07/07/2022    CO2 28.8 07/07/2022 "    CALCIUM 9.6 07/07/2022    PROTENTOTREF 6.4 07/07/2022    ALBUMIN 4.10 07/07/2022    LABIL2 1.8 07/07/2022    AST 21 07/07/2022    ALT 20 07/07/2022     Lab Results   Component Value Date    CHLPL 175 06/07/2022    CHLPL 212 (H) 11/24/2021    CHLPL 200 08/12/2020     Lab Results   Component Value Date    TRIG 27 06/07/2022    TRIG 65 11/24/2021    TRIG 43 08/12/2020     Lab Results   Component Value Date     (H) 06/07/2022     (H) 11/24/2021     (H) 08/12/2020     Lab Results   Component Value Date    LDL 44 06/07/2022    LDL 91 11/24/2021    LDL 76 08/12/2020       I have reviewed the most recent lab results.       Assessment:          Diagnosis Plan   1. Essential hypertension   blood pressures are well controlled.  Continue lisinopril and hydrochlorothiazide.  Monitor and record daily blood pressure. Report readings consistently higher than 130/90 or consistently lower than 100/60.    2. LAFB (left anterior fascicular block)  Chronic. Stable.    3. RBBB  Chronic. Stable.           Plan:         1. Continue medications as previously prescribed.  2. Report any worsening symptoms.  3. Report any signs of bleeding.  4. Continue heart healthy diet and regular exercise as tolerated.   5. Follow up with PCP for blood pressure and cholesterol management and routine lab work.  6. Follow up in six months, or sooner if needed.

## 2022-10-13 ENCOUNTER — OFFICE VISIT (OUTPATIENT)
Dept: CARDIOLOGY | Facility: CLINIC | Age: 67
End: 2022-10-13

## 2022-10-13 VITALS
HEART RATE: 67 BPM | OXYGEN SATURATION: 100 % | WEIGHT: 127 LBS | HEIGHT: 68 IN | SYSTOLIC BLOOD PRESSURE: 122 MMHG | DIASTOLIC BLOOD PRESSURE: 82 MMHG | BODY MASS INDEX: 19.25 KG/M2

## 2022-10-13 DIAGNOSIS — I44.4 LAFB (LEFT ANTERIOR FASCICULAR BLOCK): Chronic | ICD-10-CM

## 2022-10-13 DIAGNOSIS — I45.10 RBBB: Chronic | ICD-10-CM

## 2022-10-13 DIAGNOSIS — I10 ESSENTIAL HYPERTENSION: Primary | Chronic | ICD-10-CM

## 2022-10-13 PROCEDURE — 93000 ELECTROCARDIOGRAM COMPLETE: CPT | Performed by: NURSE PRACTITIONER

## 2022-10-13 PROCEDURE — 99213 OFFICE O/P EST LOW 20 MIN: CPT | Performed by: NURSE PRACTITIONER

## 2022-10-13 RX ORDER — FLUTICASONE PROPIONATE AND SALMETEROL 250; 50 UG/1; UG/1
POWDER RESPIRATORY (INHALATION)
Qty: 60 EACH | Refills: 5 | Status: SHIPPED | OUTPATIENT
Start: 2022-10-13

## 2022-10-13 RX ORDER — ASPIRIN 81 MG/1
81 TABLET ORAL DAILY
COMMUNITY

## 2022-11-09 ENCOUNTER — OFFICE VISIT (OUTPATIENT)
Dept: PULMONOLOGY | Facility: CLINIC | Age: 67
End: 2022-11-09

## 2022-11-09 ENCOUNTER — HOSPITAL ENCOUNTER (OUTPATIENT)
Dept: CT IMAGING | Facility: HOSPITAL | Age: 67
Discharge: HOME | End: 2022-11-09
Admitting: INTERNAL MEDICINE

## 2022-11-09 VITALS
SYSTOLIC BLOOD PRESSURE: 124 MMHG | OXYGEN SATURATION: 96 % | BODY MASS INDEX: 19.1 KG/M2 | HEIGHT: 68 IN | DIASTOLIC BLOOD PRESSURE: 82 MMHG | WEIGHT: 126 LBS | HEART RATE: 81 BPM

## 2022-11-09 DIAGNOSIS — J44.9 COPD, MODERATE: ICD-10-CM

## 2022-11-09 DIAGNOSIS — R91.1 LUNG NODULE: ICD-10-CM

## 2022-11-09 DIAGNOSIS — J43.2 CENTRILOBULAR EMPHYSEMA: ICD-10-CM

## 2022-11-09 DIAGNOSIS — U09.9 POST-COVID-19 CONDITION: ICD-10-CM

## 2022-11-09 DIAGNOSIS — Z87.891 PERSONAL HISTORY OF NICOTINE DEPENDENCE: ICD-10-CM

## 2022-11-09 DIAGNOSIS — R91.8 LUNG NODULES: Primary | ICD-10-CM

## 2022-11-09 PROCEDURE — 99214 OFFICE O/P EST MOD 30 MIN: CPT | Performed by: INTERNAL MEDICINE

## 2022-11-09 PROCEDURE — 71250 CT THORAX DX C-: CPT

## 2022-11-09 NOTE — PATIENT INSTRUCTIONS
The patient's chest CT performed earlier today is reviewed with the patient and it did show a decrease in the size of her left upper lobe nodule from 8 mm to 4 mm.  There was a stable 3 mm nodule in the right upper lobe and then a new 3 mm nodule as well in the right lower lobe.  I think that new nodule is related to her recent COVID-19 infection but we will get a follow-up scan in 6 months.  Also I will get PFTs on a return visit in 6 months.

## 2022-11-10 NOTE — ASSESSMENT & PLAN NOTE
Her major symptoms when she had COVID were of severe cough but this has improved significantly.  I do think the new small 3 mm nodule located in the right lower lobe may be residual from her recent COVID-19 infection and again I will get a follow-up CT in about 6 months.

## 2022-11-10 NOTE — ASSESSMENT & PLAN NOTE
The previously noted 8 mm nodule had decreased in size to 4 mm.  A previously noted 3 mm nodule stable.  There was a new 3 mm nodule noted in the right lower lobe.  I did review the CT scan with the patient and demonstrated the size of these nodules to her on the office lung nodule model.  I will get a repeat CT in 6 months.

## 2022-11-10 NOTE — PROGRESS NOTES
Chief Complaint  Lung nodules and Post COVID condition    Subjective    History of Present Illness     Michelle Wilson presents to [facility] for lung nodules and a recent COVID-19 infection.    History of Present Illness   The patient did have COVID several weeks ago and is recovered well.  Her major symptoms were severe cough but this has improved.  She had her chest CT performed earlier today and her 8 mm nodule had decreased to 4 mm in size.  There was a stable 3 mm nodule and then a new 3 mm nodule as well which may relate to her recent COVID-19 infection.  I reviewed the scan with her.  I told her we will get a follow-up scan in about 6 months.  Otherwise she will continue her current regimen from the standpoint of her COPD.  She has had the COVID-19 vaccine including 3 boosters in the form of the Moderna vaccine.  She is not yet had her flu shot and we do not have the high-dose flu shot available but she could check with her primary healthcare provider or one of the local pharmacies.  She has had a Prevnar 13 and a Pneumovax in the past.  Prior to Admission medications    Medication Sig Start Date End Date Taking? Authorizing Provider   albuterol sulfate  (90 Base) MCG/ACT inhaler Inhale 2 puffs Every 4 (Four) Hours As Needed for Wheezing. 5/6/22  Yes Geo Duggan MD   alendronate (FOSAMAX) 70 MG tablet Take 1 tablet by mouth Every 7 (Seven) Days. 6/27/22  Yes Krissy Cardoso APRN   ALPRAZolam XR (XANAX XR) 0.5 MG 24 hr tablet Take 1 tablet by mouth Every Morning. 4/21/22  Yes Chery Montejo APRN   aspirin 81 MG EC tablet Take 1 tablet by mouth Daily.   Yes Sabrina Garcia MD   Azelastine HCl 137 MCG/SPRAY solution 2 sprays into the nostril(s) as directed by provider 2 (Two) Times a Day. 5/6/22  Yes Geo Duggan MD   Calcium Carbonate (CALCIUM 600 PO) Take 1 tablet by mouth Daily.   Yes Sabrina Garcia MD    Cholecalciferol (vitamin D3) 125 MCG (5000 UT) capsule capsule Take 5,000 Units by mouth Daily.   Yes Sabrina Garcia MD   etodolac (LODINE) 400 MG tablet TAKE 1 TABLET BY MOUTH TWICE A DAY 22  Yes Leydi Mon APRN   fluticasone (Flonase Allergy Relief) 50 MCG/ACT nasal spray 2 sprays into the nostril(s) as directed by provider Daily. 22  Yes Geo Duggan MD   hydroCHLOROthiazide (HYDRODIURIL) 25 MG tablet Take 1 tablet by mouth Daily. 21  Yes Silvia Servin APRN   lisinopril (PRINIVIL,ZESTRIL) 20 MG tablet Take 1 tablet by mouth Daily. 22  Yes Krissy Cardoso APRN   loratadine (CLARITIN) 10 MG tablet Take 1 tablet by mouth Daily. 22  Yes Geo Duggan MD   Multiple Vitamins-Minerals (MULTIVITAMIN ADULT PO) Take 1 tablet by mouth Daily.   Yes Sabrina Garcia MD   Omega 3 1200 MG capsule Take 1 tablet by mouth Daily.   Yes Sabrina Garcia MD   rosuvastatin (CRESTOR) 5 MG tablet Take 1 tablet by mouth Daily. 22  Yes Giovanni Lino MD   vitamin C (ASCORBIC ACID) 500 MG tablet Take 500 mg by mouth Daily.   Yes Sabrina Garcia MD   VITAMIN E PO Take 1 capsule by mouth Daily.   Yes Sabrina Garcia MD   Wixela Inhub 250-50 MCG/ACT DISKUS INHALE 1 PUFF BY MOUTH TWICE DAILY 10/13/22  Yes Keshawn Martin MD   Zinc 50 MG tablet Take 1 tablet by mouth Daily.   Yes Sabrina Garcia MD   prednisoLONE acetate (PRED FORTE) 1 % ophthalmic suspension 1 drop As Needed.    Sabrina Garcia MD       Social History     Socioeconomic History   • Marital status:    Tobacco Use   • Smoking status: Former     Packs/day: 0.25     Years: 49.00     Pack years: 12.25     Types: Cigarettes     Start date:      Quit date: 3/9/2022     Years since quittin.6   • Smokeless tobacco: Never   Vaping Use   • Vaping Use: Never used   Substance and Sexual Activity   • Alcohol use: Not Currently     Comment: occ   • Drug use: Never   • Sexual  "activity: Not Currently       Objective   Vital Signs:   /82   Pulse 81   Ht 172.7 cm (68\")   Wt 57.2 kg (126 lb)   SpO2 96% Comment: RA  BMI 19.16 kg/m²     Physical Exam  Vitals and nursing note reviewed.   Constitutional:       Comments: She is a thin white female who appears in no acute distress.   HENT:      Head: Normocephalic.      Comments: She is wearing a mask.  Eyes:      Extraocular Movements: Extraocular movements intact.      Pupils: Pupils are equal, round, and reactive to light.   Cardiovascular:      Rate and Rhythm: Normal rate and regular rhythm.   Pulmonary:      Effort: Pulmonary effort is normal.      Comments: Breath sounds diminished but no adventitious sounds are heard.  Musculoskeletal:         General: Normal range of motion.   Skin:     General: Skin is warm and dry.   Neurological:      General: No focal deficit present.      Mental Status: She is alert and oriented to person, place, and time.   Psychiatric:         Mood and Affect: Mood normal.         Behavior: Behavior normal.        Result Review :          Results for orders placed during the hospital encounter of 05/03/22    Full Pulmonary Function Test With Bronchodilator    Narrative  [facility] - Pulmonary Function Test    [address] [place] [address]  [id number]  [phone]    Patient : Edel Wilson  MRN : [id number]  CSN : [id number]  Pulmonologist : Keshawn Martin MD  Date : 5/3/2022    ______________________________________________________________________    Interpretation :  1.  Spirometry is consistent with a moderate bordering on severe obstructive ventilatory defect.  2.  There is improvement in spirometry postbronchodilator but a moderate obstructive ventilatory defect is still present.  3.  Lung volumes reveal hyperinflation.      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  That should not require any additional work-up from her standpoint although any relatives who have liver or lung disease or who have a significant smoking history may want to consider being tested.  I did contact her with these results by phone in September.    Assessment and Plan     Diagnoses and all orders for this visit:    1. Lung nodules (Primary)  Assessment & Plan:  The previously noted 8 mm nodule had decreased in size to 4 mm.  A previously noted 3 mm nodule stable.  There was a new 3 mm nodule noted in the right lower lobe.  I did review the CT scan with the patient and demonstrated the size of these nodules to her on the office lung nodule model.  I will get a repeat CT in 6 months.    Orders:  -     CT Chest Without Contrast Diagnostic; Future    2. COPD, moderate (HCC)  Assessment & Plan:  She will continue her Wixela Inhub and her as needed albuterol HFA and I will repeat pulmonary functions on return visit.        Orders:  -     Full Pulmonary Function Test Without Bronchodilator; Future    3. Centrilobular emphysema (HCC)  Assessment & Plan:  This is associated with her COPD.      Orders:  -     Full Pulmonary Function Test Without Bronchodilator; Future    4. Post-COVID-19 condition  Assessment & Plan:  Her major symptoms when she had COVID were of severe cough but this has improved significantly.  I do think the new small 3 mm nodule located in the right lower lobe may be residual from her recent COVID-19 infection and again I will get a follow-up CT in about 6 months.      5. Personal history of nicotine dependence  Assessment & Plan:  She quit smoking in March 2022.          Keshawn Martin MD  11/9/2022  18:52 CST    Follow Up   Return in about 6 months (around 5/9/2023) for Complete PFT, To see me specifically.    Patient was given instructions and  counseling regarding her condition or for health maintenance advice. Please see specific information pulled into the AVS if appropriate.

## 2022-11-10 NOTE — ASSESSMENT & PLAN NOTE
She will continue her Wixela Inhub and her as needed albuterol HFA and I will repeat pulmonary functions on return visit.

## 2022-11-11 DIAGNOSIS — F41.9 ANXIETY: ICD-10-CM

## 2022-11-11 NOTE — TELEPHONE ENCOUNTER
Caller: Michelle Wilson    Relationship: Self    Best call back number: [phone]    Requested Prescriptions:   Requested Prescriptions     Pending Prescriptions Disp Refills   • ALPRAZolam XR (XANAX XR) 0.5 MG 24 hr tablet 30 tablet 5     Sig: Take 1 tablet by mouth Every Morning.        Pharmacy where request should be sent: [facility]/PHARMACY #[id number] - [place], [place] - [address]  [phone] [facility] [phone]      Additional details provided by patient:   PATIENT IS RUNNING LOW ON MEDICATION    Does the patient have less than a 3 day supply:  [x] Yes  [] No    Yolanda Mendieta, PCT   11/11/22 15:13 CST   
When Outside In The Sun, Do You...: mostly burns, rarely tans

## 2022-11-14 RX ORDER — ALPRAZOLAM 0.5 MG/1
0.5 TABLET, EXTENDED RELEASE ORAL EVERY MORNING
Qty: 30 TABLET | Refills: 0 | Status: SHIPPED | OUTPATIENT
Start: 2022-11-14 | End: 2022-12-08

## 2022-12-01 ENCOUNTER — LAB (OUTPATIENT)
Dept: INTERNAL MEDICINE | Facility: CLINIC | Age: 67
End: 2022-12-01

## 2022-12-01 DIAGNOSIS — I10 ESSENTIAL HYPERTENSION: Primary | ICD-10-CM

## 2022-12-01 DIAGNOSIS — M81.0 AGE-RELATED OSTEOPOROSIS WITHOUT CURRENT PATHOLOGICAL FRACTURE: ICD-10-CM

## 2022-12-01 DIAGNOSIS — R53.81 OTHER MALAISE: ICD-10-CM

## 2022-12-01 DIAGNOSIS — J44.9 COPD, MODERATE: ICD-10-CM

## 2022-12-01 DIAGNOSIS — E55.9 VITAMIN D DEFICIENCY: ICD-10-CM

## 2022-12-02 LAB
ALBUMIN SERPL-MCNC: 4.8 G/DL (ref 3.5–5.2)
ALBUMIN/GLOB SERPL: 3 G/DL
ALP SERPL-CCNC: 66 U/L (ref 39–117)
ALT SERPL-CCNC: 26 U/L (ref 1–33)
APPEARANCE UR: CLEAR
AST SERPL-CCNC: 25 U/L (ref 1–32)
BACTERIA #/AREA URNS HPF: ABNORMAL /HPF
BASOPHILS # BLD AUTO: 0.05 10*3/MM3 (ref 0–0.2)
BASOPHILS NFR BLD AUTO: 0.8 % (ref 0–1.5)
BILIRUB SERPL-MCNC: 0.3 MG/DL (ref 0–1.2)
BILIRUB UR QL STRIP: NEGATIVE
BUN SERPL-MCNC: 25 MG/DL (ref 8–23)
BUN/CREAT SERPL: 27.8 (ref 7–25)
CALCIUM SERPL-MCNC: 9.8 MG/DL (ref 8.6–10.5)
CASTS URNS MICRO: ABNORMAL
CHLORIDE SERPL-SCNC: 100 MMOL/L (ref 98–107)
CHOLEST SERPL-MCNC: 201 MG/DL (ref 0–200)
CO2 SERPL-SCNC: 29 MMOL/L (ref 22–29)
COLOR UR: YELLOW
CREAT SERPL-MCNC: 0.9 MG/DL (ref 0.57–1)
EGFRCR SERPLBLD CKD-EPI 2021: 69.8 ML/MIN/1.73
EOSINOPHIL # BLD AUTO: 0.4 10*3/MM3 (ref 0–0.4)
EOSINOPHIL NFR BLD AUTO: 6 % (ref 0.3–6.2)
EPI CELLS #/AREA URNS HPF: ABNORMAL /HPF
ERYTHROCYTE [DISTWIDTH] IN BLOOD BY AUTOMATED COUNT: 12.4 % (ref 12.3–15.4)
GLOBULIN SER CALC-MCNC: 1.6 GM/DL
GLUCOSE SERPL-MCNC: 89 MG/DL (ref 65–99)
GLUCOSE UR QL STRIP: NEGATIVE
HCT VFR BLD AUTO: 36.4 % (ref 34–46.6)
HDLC SERPL-MCNC: 129 MG/DL (ref 40–60)
HGB BLD-MCNC: 12.1 G/DL (ref 12–15.9)
HGB UR QL STRIP: NEGATIVE
IMM GRANULOCYTES # BLD AUTO: 0.04 10*3/MM3 (ref 0–0.05)
IMM GRANULOCYTES NFR BLD AUTO: 0.6 % (ref 0–0.5)
KETONES UR QL STRIP: NEGATIVE
LDLC SERPL CALC-MCNC: 64 MG/DL (ref 0–100)
LEUKOCYTE ESTERASE UR QL STRIP: ABNORMAL
LYMPHOCYTES # BLD AUTO: 1.74 10*3/MM3 (ref 0.7–3.1)
LYMPHOCYTES NFR BLD AUTO: 26.2 % (ref 19.6–45.3)
MAGNESIUM SERPL-MCNC: 2.1 MG/DL (ref 1.6–2.4)
MCH RBC QN AUTO: 30.7 PG (ref 26.6–33)
MCHC RBC AUTO-ENTMCNC: 33.2 G/DL (ref 31.5–35.7)
MCV RBC AUTO: 92.4 FL (ref 79–97)
MONOCYTES # BLD AUTO: 0.73 10*3/MM3 (ref 0.1–0.9)
MONOCYTES NFR BLD AUTO: 11 % (ref 5–12)
NEUTROPHILS # BLD AUTO: 3.68 10*3/MM3 (ref 1.7–7)
NEUTROPHILS NFR BLD AUTO: 55.4 % (ref 42.7–76)
NITRITE UR QL STRIP: NEGATIVE
NRBC BLD AUTO-RTO: 0 /100 WBC (ref 0–0.2)
PH UR STRIP: 6 [PH] (ref 5–8)
PLATELET # BLD AUTO: 309 10*3/MM3 (ref 140–450)
POTASSIUM SERPL-SCNC: 4.8 MMOL/L (ref 3.5–5.2)
PROT SERPL-MCNC: 6.4 G/DL (ref 6–8.5)
PROT UR QL STRIP: NEGATIVE
RBC # BLD AUTO: 3.94 10*6/MM3 (ref 3.77–5.28)
RBC #/AREA URNS HPF: ABNORMAL /HPF
SODIUM SERPL-SCNC: 139 MMOL/L (ref 136–145)
SP GR UR STRIP: 1.01 (ref 1–1.03)
TRIGL SERPL-MCNC: 40 MG/DL (ref 0–150)
TSH SERPL DL<=0.005 MIU/L-ACNC: 1.29 UIU/ML (ref 0.27–4.2)
UROBILINOGEN UR STRIP-MCNC: ABNORMAL MG/DL
VLDLC SERPL CALC-MCNC: 8 MG/DL (ref 5–40)
WBC # BLD AUTO: 6.64 10*3/MM3 (ref 3.4–10.8)
WBC #/AREA URNS HPF: ABNORMAL /HPF

## 2022-12-08 ENCOUNTER — OFFICE VISIT (OUTPATIENT)
Dept: INTERNAL MEDICINE | Facility: CLINIC | Age: 67
End: 2022-12-08

## 2022-12-08 VITALS
HEART RATE: 90 BPM | SYSTOLIC BLOOD PRESSURE: 94 MMHG | OXYGEN SATURATION: 99 % | HEIGHT: 68 IN | BODY MASS INDEX: 19.1 KG/M2 | TEMPERATURE: 97.1 F | DIASTOLIC BLOOD PRESSURE: 58 MMHG | WEIGHT: 126 LBS

## 2022-12-08 DIAGNOSIS — I10 ESSENTIAL HYPERTENSION: Chronic | ICD-10-CM

## 2022-12-08 DIAGNOSIS — F41.9 ANXIETY: ICD-10-CM

## 2022-12-08 DIAGNOSIS — Z12.31 BREAST CANCER SCREENING BY MAMMOGRAM: ICD-10-CM

## 2022-12-08 DIAGNOSIS — Z79.899 ENCOUNTER FOR LONG-TERM (CURRENT) DRUG USE: ICD-10-CM

## 2022-12-08 DIAGNOSIS — Z00.00 ENCOUNTER FOR SUBSEQUENT ANNUAL WELLNESS VISIT (AWV) IN MEDICARE PATIENT: Primary | ICD-10-CM

## 2022-12-08 DIAGNOSIS — M81.0 AGE-RELATED OSTEOPOROSIS WITHOUT CURRENT PATHOLOGICAL FRACTURE: ICD-10-CM

## 2022-12-08 PROCEDURE — G0439 PPPS, SUBSEQ VISIT: HCPCS

## 2022-12-08 PROCEDURE — 1160F RVW MEDS BY RX/DR IN RCRD: CPT

## 2022-12-08 PROCEDURE — 1159F MED LIST DOCD IN RCRD: CPT

## 2022-12-08 PROCEDURE — 1126F AMNT PAIN NOTED NONE PRSNT: CPT

## 2022-12-08 PROCEDURE — 1170F FXNL STATUS ASSESSED: CPT

## 2022-12-08 NOTE — PROGRESS NOTES
The ABCs of the Annual Wellness Visit  Subsequent Medicare Wellness Visit    Subjective    Michelle Wilson is a 67 y.o. female who presents for a Subsequent Medicare Wellness Visit.  She denies any acute issues or complaints today.  She reports that she continues to utilize her Xanax 0.5 mg every morning to help deal with anxiety, she states that she takes this medication as result of past traumatic event.  She states that she used to take 2 mg daily and has weaned herself down to 0.5 mg, states that she has tried discontinuing this before and has not tolerated this well.  She denies any negative side effects in relation to use of this medication.  She does mention that she would like only 1 month of the Xanax sent in to her pharmacy now as she will be changing to [facility] in January and would like to have the refill sent then to the pharmacy.  Her blood pressure today is 95/58 initially, she denies noting hypotension at home, states that she has not really checked her blood pressures in the last 3 weeks.  Denies any dizziness, increased fatigue, chest pain, shortness of breath, palpitations, or diaphoresis.  She states that her blood pressures were running really well for a long time that is why she has not really been checking them frequently, states that she takes her antihypertensive therapy as prescribed.  On reassessment by myself her blood pressure is 117/70.  She states that she has significantly improved her diet over the last several months, states that she is doing this with her mother and has noted weight loss and feels better with this.  She is physically active.  She continues to follow closely with pulmonology in relation to pulmonary nodules, has a repeat CT of the chest ordered for May 2023.  She denies any urinary symptoms such as increased frequency, dysuria, pelvic pain, flank pain or foul odor.    She reports that she will be getting a new insurance provider in January and they require a  physical within the first several months.  She states that she clarified with them that she is getting her Medicare wellness visit done in December and they said to go ahead and do this and then do another wellness early on in the year of 2023.    The following portions of the patient's history were reviewed and   updated as appropriate: allergies, current medications, past family history, past medical history, past social history, past surgical history and problem list.    Compared to one year ago, the patient feels her physical   health is better.    Compared to one year ago, the patient feels her mental   health is better.    Recent Hospitalizations:  She was not admitted to the hospital during the last year.       Current Medical Providers:  Patient Care Team:  Krissy Cardoso APRN as PCP - General (Internal Medicine)  Giovanni Lino MD as Cardiologist (Cardiology)  Geo Duggan MD as Consulting Physician (Pulmonary Disease)    Outpatient Medications Prior to Visit   Medication Sig Dispense Refill   • alendronate (FOSAMAX) 70 MG tablet Take 1 tablet by mouth Every 7 (Seven) Days. 12 tablet 3   • ALPRAZolam XR (XANAX XR) 0.5 MG 24 hr tablet Take 1 tablet by mouth Every Morning. 30 tablet 0   • aspirin 81 MG EC tablet Take 1 tablet by mouth Daily.     • Azelastine HCl 137 MCG/SPRAY solution 2 sprays into the nostril(s) as directed by provider 2 (Two) Times a Day. 30 mL 11   • Calcium Carbonate (CALCIUM 600 PO) Take 1 tablet by mouth Daily.     • Cholecalciferol (vitamin D3) 125 MCG (5000 UT) capsule capsule Take 5,000 Units by mouth Daily.     • etodolac (LODINE) 400 MG tablet TAKE 1 TABLET BY MOUTH TWICE A DAY 60 tablet 2   • fluticasone (Flonase Allergy Relief) 50 MCG/ACT nasal spray 2 sprays into the nostril(s) as directed by provider Daily. 16 g 11   • hydroCHLOROthiazide (HYDRODIURIL) 25 MG tablet Take 1 tablet by mouth Daily. 90 tablet 3   • lisinopril (PRINIVIL,ZESTRIL) 20 MG tablet Take 1 tablet  by mouth Daily. 90 tablet 3   • loratadine (CLARITIN) 10 MG tablet Take 1 tablet by mouth Daily. 90 tablet 3   • Multiple Vitamins-Minerals (MULTIVITAMIN ADULT PO) Take 1 tablet by mouth Daily.     • Omega 3 1200 MG capsule Take 1 tablet by mouth Daily.     • prednisoLONE acetate (PRED FORTE) 1 % ophthalmic suspension 1 drop As Needed.     • rosuvastatin (CRESTOR) 5 MG tablet Take 1 tablet by mouth Daily. 90 tablet 3   • vitamin C (ASCORBIC ACID) 500 MG tablet Take 500 mg by mouth Daily.     • VITAMIN E PO Take 1 capsule by mouth Daily.     • Wixela Inhub 250-50 MCG/ACT DISKUS INHALE 1 PUFF BY MOUTH TWICE DAILY 60 each 5   • Zinc 50 MG tablet Take 1 tablet by mouth Daily.     • albuterol sulfate  (90 Base) MCG/ACT inhaler Inhale 2 puffs Every 4 (Four) Hours As Needed for Wheezing. 6.7 g 5     No facility-administered medications prior to visit.       No opioid medication identified on active medication list. I have reviewed chart for other potential  high risk medication/s and harmful drug interactions in the elderly.        Physical Exam  Vitals and nursing note reviewed.   Constitutional:       General: She is not in acute distress.     Appearance: Normal appearance. She is normal weight. She is not toxic-appearing or diaphoretic.   HENT:      Head: Normocephalic and atraumatic.      Right Ear: Tympanic membrane, ear canal and external ear normal. There is no impacted cerumen.      Left Ear: Tympanic membrane, ear canal and external ear normal. There is no impacted cerumen.      Nose: Nose normal. No congestion or rhinorrhea.      Mouth/Throat:      Mouth: Mucous membranes are moist.      Pharynx: Oropharynx is clear. No oropharyngeal exudate or posterior oropharyngeal erythema.      Comments: Dentures in place    Eyes:      Extraocular Movements: Extraocular movements intact.      Conjunctiva/sclera: Conjunctivae normal.      Pupils: Pupils are equal, round, and reactive to light.   Neck:      Thyroid: No  thyroid mass, thyromegaly or thyroid tenderness.      Vascular: No carotid bruit.   Cardiovascular:      Rate and Rhythm: Normal rate and regular rhythm.      Pulses: Normal pulses.      Heart sounds: Normal heart sounds. No murmur heard.  Pulmonary:      Effort: Pulmonary effort is normal. No respiratory distress.      Breath sounds: Normal breath sounds. No wheezing or rales.   Chest:   Breasts:     Breasts are symmetrical.      Right: Normal.      Left: Normal.   Abdominal:      General: Bowel sounds are normal. There is no distension.      Palpations: Abdomen is soft. There is no mass.      Tenderness: There is no abdominal tenderness. There is no guarding or rebound.   Musculoskeletal:         General: Normal range of motion.      Cervical back: Normal range of motion and neck supple. No rigidity or tenderness.      Right lower leg: No edema.      Left lower leg: No edema.   Lymphadenopathy:      Cervical: No cervical adenopathy.      Upper Body:      Right upper body: No supraclavicular, axillary or pectoral adenopathy.      Left upper body: No supraclavicular, axillary or pectoral adenopathy.   Skin:     General: Skin is warm and dry.      Capillary Refill: Capillary refill takes less than 2 seconds.   Neurological:      General: No focal deficit present.      Mental Status: She is alert and oriented to person, place, and time. Mental status is at baseline.   Psychiatric:         Mood and Affect: Mood normal.         Behavior: Behavior normal.         Thought Content: Thought content normal.         Judgment: Judgment normal.       Aspirin is on active medication list. Aspirin use is indicated based on review of current medical condition/s. Pros and cons of this therapy have been discussed today. Benefits of this medication outweigh potential harm.  Patient has been encouraged to continue taking this medication.  .      Patient Active Problem List   Diagnosis   • Essential hypertension   • Abnormal ECG   •  "SOB (shortness of breath) on exertion   • RBBB   • LAFB (left anterior fascicular block)   • Anxiety and depression   • Chronic midline low back pain without sciatica   • Family history of early CAD   • Atherosclerosis neck vessels    • Ex-smoker for less than 1 year   • COPD, moderate (HCC)   • Non-seasonal allergic rhinitis   • Cough   • Granulomatous lung disease (HCC)   • Centrilobular emphysema (HCC)   • Basal cell carcinoma (BCC) of left lower extremity   • Anxiety   • Age-related osteoporosis without current pathological fracture   • Lung nodules   • Personal history of nicotine dependence   • Post-COVID-19 condition     Advance Care Planning  Advance Directive is not on file.  ACP discussion was held with the patient during this visit. Patient does not have an advance directive, information provided.     Objective    Vitals:    22 1301   BP: 94/58   BP Location: Right arm   Patient Position: Sitting   Cuff Size: Adult   Pulse: 90   Temp: 97.1 °F (36.2 °C)   TempSrc: Temporal   SpO2: 99%   Weight: 57.2 kg (126 lb)   Height: 172.7 cm (68\")   PainSc: 0-No pain     Estimated body mass index is 19.16 kg/m² as calculated from the following:    Height as of this encounter: 172.7 cm (68\").    Weight as of this encounter: 57.2 kg (126 lb).    BMI is within normal parameters. No other follow-up for BMI required.      Does the patient have evidence of cognitive impairment?   No    Lab Results   Component Value Date    CHLPL 201 (H) 2022    TRIG 40 2022     (H) 2022    LDL 64 2022    VLDL 8 2022          HEALTH RISK ASSESSMENT    Smoking Status:  Social History     Tobacco Use   Smoking Status Former   • Packs/day: 1.00   • Years: 49.00   • Pack years: 49.00   • Types: Cigarettes   • Start date:    • Quit date: 2022   • Years since quittin.7   Smokeless Tobacco Never     Alcohol Consumption:  Social History     Substance and Sexual Activity   Alcohol Use Not " Currently    Comment: occ     Fall Risk Screen:    STEADI Fall Risk Assessment was completed, and patient is at LOW risk for falls.Assessment completed on:12/8/2022    Depression Screening:  PHQ-2/PHQ-9 Depression Screening 12/8/2022   Retired Total Score -   Little Interest or Pleasure in Doing Things 0-->not at all   Feeling Down, Depressed or Hopeless 0-->not at all   PHQ-9: Brief Depression Severity Measure Score 0       Health Habits and Functional and Cognitive Screening:  Functional & Cognitive Status 12/8/2022   Do you have difficulty preparing food and eating? No   Do you have difficulty bathing yourself, getting dressed or grooming yourself? No   Do you have difficulty using the toilet? No   Do you have difficulty moving around from place to place? No   Do you have trouble with steps or getting out of a bed or a chair? No   Current Diet Well Balanced Diet   Dental Exam Up to date   Eye Exam Up to date   Exercise (times per week) 4 times per week   Current Exercises Include Walking   Do you need help using the phone?  No   Are you deaf or do you have serious difficulty hearing?  No   Do you need help with transportation? No   Do you need help shopping? No   Do you need help preparing meals?  No   Do you need help with housework?  No   Do you need help with laundry? No   Do you need help taking your medications? No   Do you need help managing money? No   Do you ever drive or ride in a car without wearing a seat belt? No   Have you felt unusual stress, anger or loneliness in the last month? No   Who do you live with? Child   If you need help, do you have trouble finding someone available to you? No   Have you been bothered in the last four weeks by sexual problems? No   Do you have difficulty concentrating, remembering or making decisions? No       Age-appropriate Screening Schedule:  Refer to the list below for future screening recommendations based on patient's age, sex and/or medical conditions. Orders for  these recommended tests are listed in the plan section. The patient has been provided with a written plan.    Health Maintenance   Topic Date Due   • TDAP/TD VACCINES (1 - Tdap) 12/08/2023 (Originally 7/3/1974)   • MAMMOGRAM  12/22/2023   • DXA SCAN  12/22/2023   • INFLUENZA VACCINE  Completed   • ZOSTER VACCINE  Completed                CMS Preventative Services Quick Reference  Risk Factors Identified During Encounter:    Immunizations Discussed/Encouraged: Tdap  Polypharmacy: Medication List reviewed  Dental Screening Recommended  Vision Screening Recommended    The above risks/problems have been discussed with the patient.  Pertinent information has been shared with the patient in the After Visit Summary.    Diagnoses and all orders for this visit:    1. Encounter for subsequent annual wellness visit (AWV) in Medicare patient (Primary)    2. Anxiety    3. Essential hypertension    4. Age-related osteoporosis without current pathological fracture    5. Encounter for long-term (current) drug use  -     Compliance Drug Analysis, Ur - Urine, Clean Catch    6. Breast cancer screening by mammogram  -     Mammo Screening Digital Tomosynthesis Bilateral With CAD; Future        Follow Up:   Next Medicare Wellness visit to be scheduled in 1 year.      An After Visit Summary and PPPS were made available to the patient.        Plan of care and lab results reviewed with Ms. Martinez  Her blood pressure initially today was 95/58, on reassessment 117/70, I do feel that we can go ahead and trial decreasing her hydrochlorothiazide to 12.5 mg daily instead of 25 mg, she is aware to monitor her blood pressures more closely at home and to report readings greater than 130/80.  Her anxiety is currently well controlled with use of 0.5 mg of Xanax daily, her UDS will be updated today, she is up-to-date on her CSA, is compliant with use of the medication.  We will pend renewal of 1 month supply to current pharmacy of note she will need  this sent to [facility] pharmacy from thereon.  Continue with Fosamax for osteoporosis for now, will be due for bone density scan next December.  Breast exam unremarkable today, continue with routine self breast exams at home, order placed for mammogram to be performed later this month.  Continue with routine ophthalmology and orthodontic exams  We did discuss vaccines, no need for Tdap at this time  Follow-up in February for another annual wellness visit sooner than this if needed.

## 2022-12-09 RX ORDER — ALPRAZOLAM 0.5 MG/1
0.5 TABLET, EXTENDED RELEASE ORAL EVERY MORNING
Qty: 30 TABLET | Refills: 0 | Status: SHIPPED | OUTPATIENT
Start: 2022-12-14 | End: 2023-01-13 | Stop reason: SDUPTHER

## 2022-12-18 LAB — DRUGS UR: NORMAL

## 2022-12-22 ENCOUNTER — HOSPITAL ENCOUNTER (OUTPATIENT)
Dept: MAMMOGRAPHY | Facility: HOSPITAL | Age: 67
Discharge: HOME | End: 2022-12-22

## 2022-12-22 DIAGNOSIS — Z12.31 BREAST CANCER SCREENING BY MAMMOGRAM: ICD-10-CM

## 2022-12-22 PROCEDURE — 77067 SCR MAMMO BI INCL CAD: CPT

## 2022-12-22 PROCEDURE — 77063 BREAST TOMOSYNTHESIS BI: CPT

## 2023-01-13 DIAGNOSIS — F41.9 ANXIETY: ICD-10-CM

## 2023-01-13 RX ORDER — ALPRAZOLAM 0.5 MG/1
0.5 TABLET, EXTENDED RELEASE ORAL EVERY MORNING
Qty: 30 TABLET | Refills: 2 | Status: SHIPPED | OUTPATIENT
Start: 2023-01-13

## 2023-01-13 NOTE — TELEPHONE ENCOUNTER
Caller: Michelle Wilson    Relationship: Self    Best call back number:  [phone]      Requested Prescriptions     Pending Prescriptions Disp Refills   • ALPRAZolam XR (XANAX XR) 0.5 MG 24 hr tablet 30 tablet 0     Sig: Take 1 tablet by mouth Every Morning.        Pharmacy where request should be sent: [facility] [address] . - [phone] [facility] [phone] FX         Does the patient have less than a 3 day supply:  [x] Yes  [] No    Would you like a call back once the refill request has been completed: [x] Yes [] No    If the office needs to give you a call back, can they leave a voicemail: [x] Yes [] No    Vanessa James Rep   01/13/23 10:10 CST

## 2023-02-17 ENCOUNTER — LAB (OUTPATIENT)
Dept: INTERNAL MEDICINE | Facility: CLINIC | Age: 68
End: 2023-02-17
Payer: MEDICARE

## 2023-02-17 DIAGNOSIS — E55.9 VITAMIN D DEFICIENCY: ICD-10-CM

## 2023-02-17 DIAGNOSIS — M81.0 OSTEOPOROSIS, UNSPECIFIED OSTEOPOROSIS TYPE, UNSPECIFIED PATHOLOGICAL FRACTURE PRESENCE: ICD-10-CM

## 2023-02-17 DIAGNOSIS — I10 ESSENTIAL HYPERTENSION: Chronic | ICD-10-CM

## 2023-02-17 DIAGNOSIS — Z79.899 ENCOUNTER FOR LONG-TERM CURRENT USE OF MEDICATION: ICD-10-CM

## 2023-02-17 RX ORDER — HYDROCHLOROTHIAZIDE 25 MG/1
25 TABLET ORAL DAILY
Qty: 90 TABLET | Refills: 1 | Status: SHIPPED | OUTPATIENT
Start: 2023-02-17 | End: 2023-02-23

## 2023-02-18 LAB
25(OH)D3+25(OH)D2 SERPL-MCNC: 119 NG/ML (ref 30–100)
ALBUMIN SERPL-MCNC: 4.6 G/DL (ref 3.5–5.2)
ALBUMIN/GLOB SERPL: 2 G/DL
ALP SERPL-CCNC: 73 U/L (ref 39–117)
ALT SERPL-CCNC: 16 U/L (ref 1–33)
APPEARANCE UR: CLEAR
AST SERPL-CCNC: 23 U/L (ref 1–32)
BACTERIA #/AREA URNS HPF: ABNORMAL /HPF
BASOPHILS # BLD AUTO: 0.08 10*3/MM3 (ref 0–0.2)
BASOPHILS NFR BLD AUTO: 0.9 % (ref 0–1.5)
BILIRUB SERPL-MCNC: 0.4 MG/DL (ref 0–1.2)
BILIRUB UR QL STRIP: NEGATIVE
BUN SERPL-MCNC: 26 MG/DL (ref 8–23)
BUN/CREAT SERPL: 28 (ref 7–25)
CALCIUM SERPL-MCNC: 9.6 MG/DL (ref 8.6–10.5)
CASTS URNS MICRO: ABNORMAL
CHLORIDE SERPL-SCNC: 98 MMOL/L (ref 98–107)
CHOLEST SERPL-MCNC: 204 MG/DL (ref 0–200)
CO2 SERPL-SCNC: 29.5 MMOL/L (ref 22–29)
COLOR UR: YELLOW
CREAT SERPL-MCNC: 0.93 MG/DL (ref 0.57–1)
EGFRCR SERPLBLD CKD-EPI 2021: 67.1 ML/MIN/1.73
EOSINOPHIL # BLD AUTO: 0.68 10*3/MM3 (ref 0–0.4)
EOSINOPHIL NFR BLD AUTO: 7.4 % (ref 0.3–6.2)
EPI CELLS #/AREA URNS HPF: ABNORMAL /HPF
ERYTHROCYTE [DISTWIDTH] IN BLOOD BY AUTOMATED COUNT: 12.1 % (ref 12.3–15.4)
GLOBULIN SER CALC-MCNC: 2.3 GM/DL
GLUCOSE SERPL-MCNC: 93 MG/DL (ref 65–99)
GLUCOSE UR QL STRIP: NEGATIVE
HCT VFR BLD AUTO: 36.2 % (ref 34–46.6)
HDLC SERPL-MCNC: 149 MG/DL (ref 40–60)
HGB BLD-MCNC: 12.1 G/DL (ref 12–15.9)
HGB UR QL STRIP: NEGATIVE
IMM GRANULOCYTES # BLD AUTO: 0.06 10*3/MM3 (ref 0–0.05)
IMM GRANULOCYTES NFR BLD AUTO: 0.7 % (ref 0–0.5)
KETONES UR QL STRIP: NEGATIVE
LDLC SERPL CALC-MCNC: 47 MG/DL (ref 0–100)
LEUKOCYTE ESTERASE UR QL STRIP: ABNORMAL
LYMPHOCYTES # BLD AUTO: 1.65 10*3/MM3 (ref 0.7–3.1)
LYMPHOCYTES NFR BLD AUTO: 18 % (ref 19.6–45.3)
MCH RBC QN AUTO: 31.7 PG (ref 26.6–33)
MCHC RBC AUTO-ENTMCNC: 33.4 G/DL (ref 31.5–35.7)
MCV RBC AUTO: 94.8 FL (ref 79–97)
MONOCYTES # BLD AUTO: 0.83 10*3/MM3 (ref 0.1–0.9)
MONOCYTES NFR BLD AUTO: 9 % (ref 5–12)
NEUTROPHILS # BLD AUTO: 5.88 10*3/MM3 (ref 1.7–7)
NEUTROPHILS NFR BLD AUTO: 64 % (ref 42.7–76)
NITRITE UR QL STRIP: NEGATIVE
NRBC BLD AUTO-RTO: 0 /100 WBC (ref 0–0.2)
PH UR STRIP: 6 [PH] (ref 5–8)
PLATELET # BLD AUTO: 331 10*3/MM3 (ref 140–450)
POTASSIUM SERPL-SCNC: 4.5 MMOL/L (ref 3.5–5.2)
PROT SERPL-MCNC: 6.9 G/DL (ref 6–8.5)
PROT UR QL STRIP: NEGATIVE
RBC # BLD AUTO: 3.82 10*6/MM3 (ref 3.77–5.28)
RBC #/AREA URNS HPF: ABNORMAL /HPF
SODIUM SERPL-SCNC: 138 MMOL/L (ref 136–145)
SP GR UR STRIP: 1.01 (ref 1–1.03)
TRIGL SERPL-MCNC: 41 MG/DL (ref 0–150)
TSH SERPL DL<=0.005 MIU/L-ACNC: 1.22 UIU/ML (ref 0.27–4.2)
UROBILINOGEN UR STRIP-MCNC: ABNORMAL MG/DL
VLDLC SERPL CALC-MCNC: 8 MG/DL (ref 5–40)
WBC # BLD AUTO: 9.18 10*3/MM3 (ref 3.4–10.8)
WBC #/AREA URNS HPF: ABNORMAL /HPF

## 2023-02-20 NOTE — PROGRESS NOTES
We will discuss lab results on 2/23 however vitamin D is too elevated, would recommend stopping vitamin D supplementation until we discuss in office.

## 2023-02-23 ENCOUNTER — OFFICE VISIT (OUTPATIENT)
Dept: INTERNAL MEDICINE | Facility: CLINIC | Age: 68
End: 2023-02-23
Payer: MEDICARE

## 2023-02-23 VITALS
DIASTOLIC BLOOD PRESSURE: 66 MMHG | TEMPERATURE: 97.8 F | SYSTOLIC BLOOD PRESSURE: 112 MMHG | RESPIRATION RATE: 18 BRPM | BODY MASS INDEX: 19.25 KG/M2 | HEIGHT: 68 IN | WEIGHT: 127 LBS | OXYGEN SATURATION: 97 % | HEART RATE: 85 BPM

## 2023-02-23 DIAGNOSIS — J43.2 CENTRILOBULAR EMPHYSEMA: ICD-10-CM

## 2023-02-23 DIAGNOSIS — Z00.00 ENCOUNTER FOR SUBSEQUENT ANNUAL WELLNESS VISIT (AWV) IN MEDICARE PATIENT: Primary | ICD-10-CM

## 2023-02-23 DIAGNOSIS — J30.9 CHRONIC ALLERGIC RHINITIS: ICD-10-CM

## 2023-02-23 DIAGNOSIS — Z78.9 EX-SMOKER FOR LESS THAN 1 YEAR: ICD-10-CM

## 2023-02-23 DIAGNOSIS — Z12.11 ENCOUNTER FOR COLORECTAL CANCER SCREENING: ICD-10-CM

## 2023-02-23 DIAGNOSIS — Z12.12 ENCOUNTER FOR COLORECTAL CANCER SCREENING: ICD-10-CM

## 2023-02-23 DIAGNOSIS — Z86.010 HISTORY OF COLONIC POLYPS: ICD-10-CM

## 2023-02-23 DIAGNOSIS — Z78.0 POST-MENOPAUSAL: ICD-10-CM

## 2023-02-23 DIAGNOSIS — E55.9 VITAMIN D DEFICIENCY: ICD-10-CM

## 2023-02-23 DIAGNOSIS — I10 ESSENTIAL HYPERTENSION: Chronic | ICD-10-CM

## 2023-02-23 DIAGNOSIS — M81.0 AGE-RELATED OSTEOPOROSIS WITHOUT CURRENT PATHOLOGICAL FRACTURE: ICD-10-CM

## 2023-02-23 DIAGNOSIS — E78.00 ELEVATED CHOLESTEROL: ICD-10-CM

## 2023-02-23 DIAGNOSIS — Z12.31 ENCOUNTER FOR SCREENING MAMMOGRAM FOR MALIGNANT NEOPLASM OF BREAST: ICD-10-CM

## 2023-02-23 DIAGNOSIS — N28.9 RENAL IMPAIRMENT: ICD-10-CM

## 2023-02-23 PROBLEM — R94.31 ABNORMAL ECG: Status: RESOLVED | Noted: 2020-09-02 | Resolved: 2023-02-23

## 2023-02-23 PROBLEM — R05.9 COUGH: Status: RESOLVED | Noted: 2022-05-06 | Resolved: 2023-02-23

## 2023-02-23 PROCEDURE — 1126F AMNT PAIN NOTED NONE PRSNT: CPT

## 2023-02-23 PROCEDURE — 1159F MED LIST DOCD IN RCRD: CPT

## 2023-02-23 PROCEDURE — 1170F FXNL STATUS ASSESSED: CPT

## 2023-02-23 PROCEDURE — 1160F RVW MEDS BY RX/DR IN RCRD: CPT

## 2023-02-23 PROCEDURE — G0439 PPPS, SUBSEQ VISIT: HCPCS

## 2023-02-23 NOTE — PROGRESS NOTES
"The ABCs of the Annual Wellness Visit  Subsequent Medicare Wellness Visit    Subjective      Michelle Wilson is a 67 y.o. female who presents for a Subsequent Medicare Wellness Visit.  She denies any acute problems today.  She states that overall she has been feeling great, she continues to implement a \"keto diet\", however has modified it to be something that she can continue to live with, she states that she still eats a lot of fruit, typically more berries, she eats a lot of vegetables and lean protein such as fish, she does incorporate foods such as yogurt and cheese.  She states her biggest issue is her allergies, she wakes up with sinus congestion almost every morning.  She does live with 3 dogs.  Reports she has previously had air purifier/humidifier but does not have one currently.  She continues to take her Flonase, Claritin.  She reports that she takes a daily vitamin D3 supplement along with calcium that also has vitamin D in it.  She is requesting refill on hydrochlorothiazide, states that she has been out of it for a little bit, had previously been taking half a tablet of 25 mg after our last visit.  Blood pressures have been normotensive at home, she denies any chest pain, denies any shortness of breath even with exertion.    The following portions of the patient's history were reviewed and   updated as appropriate: allergies, current medications, past family history, past medical history, past social history, past surgical history and problem list.    Compared to one year ago, the patient feels her physical   health is better.    Compared to one year ago, the patient feels her mental   health is better.    Recent Hospitalizations:  She was not admitted to the hospital during the last year.       Current Medical Providers:  Patient Care Team:  Krissy Cardoso APRN as PCP - General (Internal Medicine)  Giovanni Lino MD as Cardiologist (Cardiology)  Geo Duggan MD as Consulting Physician " (Pulmonary Disease)    Outpatient Medications Prior to Visit   Medication Sig Dispense Refill   • albuterol sulfate  (90 Base) MCG/ACT inhaler Inhale 2 puffs Every 4 (Four) Hours As Needed for Wheezing. 6.7 g 5   • alendronate (FOSAMAX) 70 MG tablet Take 1 tablet by mouth Every 7 (Seven) Days. 12 tablet 3   • ALPRAZolam XR (XANAX XR) 0.5 MG 24 hr tablet Take 1 tablet by mouth Every Morning. 30 tablet 2   • aspirin 81 MG EC tablet Take 1 tablet by mouth Daily.     • Azelastine HCl 137 MCG/SPRAY solution 2 sprays into the nostril(s) as directed by provider 2 (Two) Times a Day. 30 mL 11   • Calcium Carbonate (CALCIUM 600 PO) Take 1 tablet by mouth Daily.     • etodolac (LODINE) 400 MG tablet TAKE 1 TABLET BY MOUTH TWICE A DAY 60 tablet 2   • fluticasone (Flonase Allergy Relief) 50 MCG/ACT nasal spray 2 sprays into the nostril(s) as directed by provider Daily. 16 g 11   • lisinopril (PRINIVIL,ZESTRIL) 20 MG tablet Take 1 tablet by mouth Daily. 90 tablet 3   • loratadine (CLARITIN) 10 MG tablet Take 1 tablet by mouth Daily. 90 tablet 3   • Multiple Vitamins-Minerals (MULTIVITAMIN ADULT PO) Take 1 tablet by mouth Daily.     • Omega 3 1200 MG capsule Take 1 tablet by mouth Daily.     • rosuvastatin (CRESTOR) 5 MG tablet Take 1 tablet by mouth Daily. 90 tablet 3   • vitamin C (ASCORBIC ACID) 500 MG tablet Take 500 mg by mouth Daily.     • VITAMIN E PO Take 1 capsule by mouth Daily.     • Wixela Inhub 250-50 MCG/ACT DISKUS INHALE 1 PUFF BY MOUTH TWICE DAILY 60 each 5   • Zinc 50 MG tablet Take 1 tablet by mouth Daily.     • Cholecalciferol (vitamin D3) 125 MCG (5000 UT) capsule capsule Take 5,000 Units by mouth Daily.     • hydroCHLOROthiazide (HYDRODIURIL) 25 MG tablet Take 1 tablet by mouth Daily. 90 tablet 1   • prednisoLONE acetate (PRED FORTE) 1 % ophthalmic suspension 1 drop As Needed.       No facility-administered medications prior to visit.       No opioid medication identified on active medication list. I  "have reviewed chart for other potential  high risk medication/s and harmful drug interactions in the elderly.          Aspirin is on active medication list. Aspirin use is indicated based on review of current medical condition/s. Pros and cons of this therapy have been discussed today. Benefits of this medication outweigh potential harm.  Patient has been encouraged to continue taking this medication.  .      Patient Active Problem List   Diagnosis   • Essential hypertension   • SOB (shortness of breath) on exertion   • RBBB   • LAFB (left anterior fascicular block)   • Anxiety and depression   • Chronic midline low back pain without sciatica   • Family history of early CAD   • Atherosclerosis neck vessels    • Ex-smoker for less than 1 year   • COPD, moderate (HCC)   • Non-seasonal allergic rhinitis   • Granulomatous lung disease (HCC)   • Centrilobular emphysema (HCC)   • Basal cell carcinoma (BCC) of left lower extremity   • Anxiety   • Age-related osteoporosis without current pathological fracture   • Lung nodules   • Personal history of nicotine dependence   • Post-COVID-19 condition     Advance Care Planning  Advance Directive is not on file.  ACP discussion was held with the patient during this visit. Patient does not have an advance directive, information provided.     Objective    Vitals:    02/23/23 1024   BP: 112/66   BP Location: Left arm   Patient Position: Sitting   Cuff Size: Adult   Pulse: 85   Resp: 18   Temp: 97.8 °F (36.6 °C)   TempSrc: Infrared   SpO2: 97%   Weight: 57.6 kg (127 lb)   Height: 172.7 cm (68\")   PainSc: 0-No pain     Estimated body mass index is 19.31 kg/m² as calculated from the following:    Height as of this encounter: 172.7 cm (68\").    Weight as of this encounter: 57.6 kg (127 lb).    BMI is within normal parameters. No other follow-up for BMI required.      Does the patient have evidence of cognitive impairment?   No    Lab Results   Component Value Date    CHLPL 204 (H) " 2023    TRIG 41 2023     (H) 2023    LDL 47 2023    VLDL 8 2023          HEALTH RISK ASSESSMENT    Smoking Status:  Social History     Tobacco Use   Smoking Status Former   • Packs/day: 1.00   • Years: 49.00   • Pack years: 49.00   • Types: Cigarettes   • Start date:    • Quit date: 2022   • Years since quittin.9   Smokeless Tobacco Never     Alcohol Consumption:  Social History     Substance and Sexual Activity   Alcohol Use Not Currently    Comment: occ     Fall Risk Screen:    STEADI Fall Risk Assessment was completed, and patient is at LOW risk for falls.Assessment completed on:2023    Depression Screening:  PHQ-2/PHQ-9 Depression Screening 2023   Little Interest or Pleasure in Doing Things 0-->not at all   Feeling Down, Depressed or Hopeless 0-->not at all   PHQ-9: Brief Depression Severity Measure Score 0       Health Habits and Functional and Cognitive Screening:  Functional & Cognitive Status 2023   Do you have difficulty preparing food and eating? No   Do you have difficulty bathing yourself, getting dressed or grooming yourself? No   Do you have difficulty using the toilet? No   Do you have difficulty moving around from place to place? No   Do you have trouble with steps or getting out of a bed or a chair? No   Current Diet Well Balanced Diet   Dental Exam Other        Dental Exam Comment Dentures   Eye Exam Up to date   Exercise (times per week) 3 times per week   Current Exercises Include Walking   Do you need help using the phone?  No   Are you deaf or do you have serious difficulty hearing?  No   Do you need help with transportation? No   Do you need help shopping? No   Do you need help preparing meals?  No   Do you need help with housework?  No   Do you need help with laundry? No   Do you need help taking your medications? No   Do you need help managing money? No   Do you ever drive or ride in a car without wearing a seat belt? No   Have  you felt unusual stress, anger or loneliness in the last month? No   Who do you live with? Child   If you need help, do you have trouble finding someone available to you? No   Have you been bothered in the last four weeks by sexual problems? No   Do you have difficulty concentrating, remembering or making decisions? No       Age-appropriate Screening Schedule:  Refer to the list below for future screening recommendations based on patient's age, sex and/or medical conditions. Orders for these recommended tests are listed in the plan section. The patient has been provided with a written plan.    Health Maintenance   Topic Date Due   • TDAP/TD VACCINES (1 - Tdap) 12/08/2023 (Originally 7/3/1974)   • LUNG CANCER SCREENING  11/09/2023   • ANNUAL WELLNESS VISIT  12/08/2023   • DXA SCAN  12/22/2023   • LIPID PANEL  02/17/2024   • MAMMOGRAM  12/22/2024   • COLORECTAL CANCER SCREENING  11/01/2029   • HEPATITIS C SCREENING  Completed   • COVID-19 Vaccine  Completed   • INFLUENZA VACCINE  Completed   • Pneumococcal Vaccine 65+  Completed   • ZOSTER VACCINE  Completed              Physical Exam  Vitals and nursing note reviewed.   Constitutional:       General: She is not in acute distress.     Appearance: Normal appearance. She is not ill-appearing, toxic-appearing or diaphoretic.   HENT:      Head: Normocephalic and atraumatic.      Right Ear: Tympanic membrane, ear canal and external ear normal. There is no impacted cerumen.      Left Ear: Tympanic membrane, ear canal and external ear normal. There is no impacted cerumen.      Nose: Nose normal. No congestion or rhinorrhea.      Mouth/Throat:      Mouth: Mucous membranes are moist.      Pharynx: Oropharynx is clear. No oropharyngeal exudate or posterior oropharyngeal erythema.   Eyes:      General:         Right eye: No discharge.         Left eye: No discharge.      Extraocular Movements: Extraocular movements intact.      Conjunctiva/sclera: Conjunctivae normal.       Pupils: Pupils are equal, round, and reactive to light.   Neck:      Thyroid: No thyroid mass, thyromegaly or thyroid tenderness.      Vascular: No carotid bruit.   Cardiovascular:      Rate and Rhythm: Normal rate and regular rhythm.      Pulses: Normal pulses.      Heart sounds: Normal heart sounds.   Pulmonary:      Effort: Pulmonary effort is normal.      Breath sounds: Normal breath sounds.   Abdominal:      General: Bowel sounds are normal.      Palpations: Abdomen is soft.   Musculoskeletal:         General: Normal range of motion.      Cervical back: Normal range of motion and neck supple.      Right lower leg: No edema.      Left lower leg: No edema.   Skin:     General: Skin is warm and dry.      Capillary Refill: Capillary refill takes less than 2 seconds.   Neurological:      General: No focal deficit present.      Mental Status: She is alert and oriented to person, place, and time. Mental status is at baseline.   Psychiatric:         Mood and Affect: Mood normal.         Behavior: Behavior normal.         Thought Content: Thought content normal.         Judgment: Judgment normal.       CMS Preventative Services Quick Reference  Risk Factors Identified During Encounter:    Vision Screening Recommended    The above risks/problems have been discussed with the patient.  Pertinent information has been shared with the patient in the After Visit Summary.    Diagnoses and all orders for this visit:    1. Encounter for subsequent annual wellness visit (AWV) in Medicare patient (Primary)    2. Centrilobular emphysema (HCC)    3. Ex-smoker for less than 1 year    4. Elevated cholesterol    5. Essential hypertension    6. Renal impairment  -     Basic metabolic panel; Future    7. Chronic allergic rhinitis    8. Vitamin D deficiency    9. Age-related osteoporosis without current pathological fracture  -     DEXA Bone Density Axial; Future    10. Post-menopausal  -     DEXA Bone Density Axial; Future    11.  Encounter for screening mammogram for malignant neoplasm of breast  -     Mammo Screening Digital Tomosynthesis Bilateral With CAD; Future    12. History of colonic polyps  -     Ambulatory Referral to Gastroenterology    13. Encounter for colorectal cancer screening  -     Ambulatory Referral to Gastroenterology        Follow Up:   Next Medicare Wellness visit to be scheduled in 1 year.      An After Visit Summary and PPPS were made available to the patient.        Plan of care and recent lab results reviewed with Michelle  Denies any respiratory issues such as shortness of breath  She continues to abstain from cigarette smoking, has been almost 1 year now, reportedly feeling much better with the lifestyle changes that she has made.  We discussed elevated cholesterol, LDL is at 47, HDL is significantly elevated at 149, we discussed the importance of eating even healthy foods in moderation and to eat a diverse diet, continue with current statin therapy and physical activity.  Her blood pressure today is well controlled at 112/66, her renal function continues to be slightly impaired, BUN is elevated at 26, is drinking roughly 68 ounces of liquids daily, I advised adding 8 more ounces and we will trial a discontinuation of the hydrochlorothiazide, will reassess a BMP in 3 months, she is aware to reach out if blood pressures become more elevated, greater than 130/80, otherwise continue with lisinopril 20 mg daily.  Advised continuation of current allergy medications, encouraged her to incorporate an air purifier in her bedroom to see if this would bring some additional benefit at managing symptoms.  We discussed her vitamin D level of 119, we will discontinue her vitamin D3 5000 UT, continue with current calcium plus D3 combination supplement.  Future orders placed for bone density scan and mammogram to be performed in December 2023, continue with weightbearing exercises  We discussed colorectal cancer screening,  reports last colonoscopy was in 2019, I am able to see where this was done in November 2019 however the records are not pulling up, she states that 7 polyps were removed and she was recommended to repeat in 2 years, has not had one since then, she is okay with going ahead and referring her to gastroenterology to get this completed.  For now she will return in 6 months, is aware that she can always be seen before this as needed.

## 2023-04-12 NOTE — PROGRESS NOTES
Subjective:     Encounter Date: 4/13/2023      Patient ID: Michelle Wilson is a 67 y.o. female     HPI: This patient presents today for routine follow-up. She has hypertension, left anterior fascicular block, right bundle branch block, anxiety, depression, skin cancer and former tobacco use. She reports some shortness of breath in the mornings when first getting out of bed, however she reports improvement since smoking cessation. Patient denies chest pain, palpitations, dizziness, syncope, orthopnea, PND, edema or decreased stamina.  Patient denies any signs of bleeding.    Chief Complaint: Routine follow-up  Hypertension  This is a chronic problem. The current episode started more than 1 year ago. The problem is controlled. Pertinent negatives include no anxiety, blurred vision, chest pain, headaches, malaise/fatigue, neck pain, orthopnea, palpitations, peripheral edema, PND, shortness of breath or sweats. Risk factors for coronary artery disease include post-menopausal state. Current antihypertension treatment includes ACE inhibitors and diuretics. The current treatment provides significant improvement.     I have reviewed and confirmed the accuracy of the HPI LUAN Anderson        Previous Cardiac Testing:  Results for orders placed during the hospital encounter of 09/25/20    Adult Transthoracic Echo Complete W/ Cont if Necessary Per Protocol    Interpretation Summary  · Left ventricular ejection fraction appears to be 61 - 65%. Left ventricular systolic function is normal.  · Left ventricular diastolic function was normal  · Estimated right ventricular systolic pressure from tricuspid regurgitation is normal (<35 mmHg).        The following portions of the patient's history were reviewed and updated as appropriate: allergies, current medications, past family history, past medical history, past social history, past surgical history and problem list.    No Known Allergies    Current Outpatient  Medications:   •  albuterol sulfate  (90 Base) MCG/ACT inhaler, Inhale 2 puffs Every 4 (Four) Hours As Needed for Wheezing., Disp: 6.7 g, Rfl: 5  •  alendronate (FOSAMAX) 70 MG tablet, Take 1 tablet by mouth Every 7 (Seven) Days., Disp: 12 tablet, Rfl: 3  •  ALPRAZolam XR (XANAX XR) 0.5 MG 24 hr tablet, Take 1 tablet by mouth Every Morning., Disp: 30 tablet, Rfl: 2  •  aspirin 81 MG EC tablet, Take 1 tablet by mouth Daily., Disp: , Rfl:   •  Azelastine HCl 137 MCG/SPRAY solution, 2 sprays into the nostril(s) as directed by provider 2 (Two) Times a Day., Disp: 30 mL, Rfl: 11  •  Calcium Carbonate (CALCIUM 600 PO), Take 1 tablet by mouth Daily., Disp: , Rfl:   •  etodolac (LODINE) 400 MG tablet, TAKE 1 TABLET BY MOUTH TWICE A DAY, Disp: 60 tablet, Rfl: 2  •  fluticasone (Flonase Allergy Relief) 50 MCG/ACT nasal spray, 2 sprays into the nostril(s) as directed by provider Daily., Disp: 16 g, Rfl: 11  •  lisinopril (PRINIVIL,ZESTRIL) 20 MG tablet, Take 1 tablet by mouth Daily., Disp: 90 tablet, Rfl: 3  •  loratadine (CLARITIN) 10 MG tablet, Take 1 tablet by mouth Daily., Disp: 90 tablet, Rfl: 3  •  Multiple Vitamins-Minerals (MULTIVITAMIN ADULT PO), Take 1 tablet by mouth Daily., Disp: , Rfl:   •  Omega 3 1200 MG capsule, Take 1 tablet by mouth Daily., Disp: , Rfl:   •  rosuvastatin (CRESTOR) 5 MG tablet, Take 1 tablet by mouth Daily., Disp: 90 tablet, Rfl: 3  •  vitamin C (ASCORBIC ACID) 500 MG tablet, Take 1 tablet by mouth Daily., Disp: , Rfl:   •  VITAMIN E PO, Take 1 capsule by mouth Daily., Disp: , Rfl:   •  Wixela Inhub 250-50 MCG/ACT DISKUS, INHALE 1 PUFF BY MOUTH TWICE DAILY, Disp: 60 each, Rfl: 5  •  Zinc 50 MG tablet, Take 1 tablet by mouth Daily., Disp: , Rfl:   Past Medical History:   Diagnosis Date   • Anxiety    • Arthritis    • Bursitis and tendinitis of shoulder region     Left;  w/ small, partial tear to tendon.   • Cancer     skin   • Depression    • HYATT (dyspnea on exertion)    • Family history  of early CAD    • Full dentures    • Hypertension    • LAFB (left anterior fascicular block)    • RBBB      Past Surgical History:   Procedure Laterality Date   • BREAST BIOPSY Right    • CHOLECYSTECTOMY     • SKIN CANCER EXCISION     • TUBAL ABDOMINAL LIGATION Bilateral      Family History   Problem Relation Age of Onset   • Arthritis Mother    • Hypertension Mother    • Depression Mother    • Cancer Maternal Grandmother    • Arthritis Paternal Grandmother    • Cancer Paternal Grandmother    • Breast cancer Paternal Grandmother    • Cancer Other      Social History     Socioeconomic History   • Marital status:    Tobacco Use   • Smoking status: Former     Packs/day: 1.00     Years: 49.00     Pack years: 49.00     Types: Cigarettes     Start date:      Quit date: 2022     Years since quittin.1   • Smokeless tobacco: Never   Vaping Use   • Vaping Use: Never used   Substance and Sexual Activity   • Alcohol use: Not Currently     Comment: occ   • Drug use: Never   • Sexual activity: Not Currently       Review of Systems   Constitutional: Negative for chills, decreased appetite, fever, malaise/fatigue, night sweats, weight gain and weight loss.   HENT: Negative for nosebleeds.    Eyes: Negative for blurred vision and visual disturbance.   Cardiovascular: Negative for chest pain, dyspnea on exertion, leg swelling, near-syncope, orthopnea, palpitations, paroxysmal nocturnal dyspnea and syncope.   Respiratory: Negative for cough, hemoptysis, shortness of breath, snoring and wheezing.    Endocrine: Negative for cold intolerance and heat intolerance.   Hematologic/Lymphatic: Does not bruise/bleed easily.   Skin: Negative for rash.   Musculoskeletal: Negative for back pain, falls and neck pain.   Gastrointestinal: Negative for abdominal pain, change in bowel habit, constipation, diarrhea, dysphagia, heartburn, nausea and vomiting.   Genitourinary: Negative for hematuria.   Neurological: Negative  for dizziness, headaches, light-headedness and weakness.   Psychiatric/Behavioral: Negative for altered mental status.   Allergic/Immunologic: Negative for persistent infections.       I have reviewed and confirmed the accuracy of the ROS  LUAN Anderson             Objective:     Vitals and nursing note reviewed.   Constitutional:       General: Not in acute distress.     Appearance: Normal and healthy appearance. Well-developed, normal weight and not in distress. Not diaphoretic.   Eyes:      General: Lids are normal.         Right eye: No discharge.         Left eye: No discharge.      Conjunctiva/sclera: Conjunctivae normal.      Pupils: Pupils are equal, round, and reactive to light.   HENT:      Head: Normocephalic and atraumatic.      Jaw: There is normal jaw occlusion.      Right Ear: External ear normal.      Left Ear: External ear normal.      Nose: Nose normal.   Neck:      Thyroid: No thyromegaly.      Vascular: No carotid bruit, JVD or JVR. JVD normal.      Trachea: Trachea normal. No tracheal deviation.   Pulmonary:      Effort: Pulmonary effort is normal. No respiratory distress.      Breath sounds: No decreased breath sounds. No wheezing. No rhonchi. No rales.   Chest:      Chest wall: Not tender to palpatation.   Cardiovascular:      PMI at left midclavicular line. Normal rate. Regular rhythm. Normal S1. Normal S2.      Murmurs: There is no murmur.      No gallop. No click. No rub.   Pulses:     Intact distal pulses. No decreased pulses.   Edema:     Peripheral edema absent.   Abdominal:      General: Bowel sounds are normal. There is no distension.      Palpations: Abdomen is soft.      Tenderness: There is no abdominal tenderness.   Musculoskeletal: Normal range of motion.         General: No tenderness or deformity.      Cervical back: Normal range of motion and neck supple. Skin:     General: Skin is warm and dry.      Coloration: Skin is not pale.      Findings: No erythema or rash.  "  Neurological:      General: No focal deficit present.      Mental Status: Alert, oriented to person, place, and time and oriented to person, place and time.   Psychiatric:         Attention and Perception: Attention and perception normal.         Mood and Affect: Mood and affect normal.         Speech: Speech normal.         Behavior: Behavior normal.         Thought Content: Thought content normal.         Cognition and Memory: Cognition and memory normal.         Judgment: Judgment normal.             ECG 12 Lead    Date/Time: 4/13/2023 10:07 AM  Performed by: Jessica Magana APRN  Authorized by: Jessica Magana APRN   Comparison: compared with previous ECG from 10/13/2022  Similar to previous ECG  Rhythm: sinus rhythm  Rate: normal  BPM: 82  Conduction: 1st degree AV block  QRS axis: left    Clinical impression: abnormal EKG          /76   Pulse 82   Ht 172.7 cm (68\")   Wt 55.3 kg (122 lb)   LMP  (LMP Unknown)   SpO2 96%   BMI 18.55 kg/m²   Lab Review:   Lab Results   Component Value Date    WBC 9.18 02/17/2023    HGB 12.1 02/17/2023    HCT 36.2 02/17/2023    MCV 94.8 02/17/2023     02/17/2023     Lab Results   Component Value Date    GLUCOSE 93 02/17/2023    BUN 26 (H) 02/17/2023    CREATININE 0.93 02/17/2023    EGFRIFNONA 57 (L) 11/24/2021    EGFRIFAFRI 69 11/24/2021    BCR 28.0 (H) 02/17/2023    K 4.5 02/17/2023    CO2 29.5 (H) 02/17/2023    CALCIUM 9.6 02/17/2023    PROTENTOTREF 6.9 02/17/2023    ALBUMIN 4.6 02/17/2023    LABIL2 2.0 02/17/2023    AST 23 02/17/2023    ALT 16 02/17/2023     Lab Results   Component Value Date    CHLPL 204 (H) 02/17/2023    CHLPL 201 (H) 12/01/2022    CHLPL 175 06/07/2022     Lab Results   Component Value Date    TRIG 41 02/17/2023    TRIG 40 12/01/2022    TRIG 27 06/07/2022     Lab Results   Component Value Date     (H) 02/17/2023     (H) 12/01/2022     (H) 06/07/2022     Lab Results   Component Value Date    LDL 47 02/17/2023    " LDL 64 12/01/2022    LDL 44 06/07/2022       I have reviewed the most recent lab results.       Assessment:          Diagnosis Plan   1. Essential hypertension   blood pressures are well controlled.  Continue lisinopril and hydrochlorothiazide.  Monitor and record daily blood pressure. Report readings consistently higher than 130/80 or consistently lower than 100/60.    2. LAFB (left anterior fascicular block)  Chronic. Not present on EKG today.    3. RBBB  Chronic. Not present on EKG today.     4. AV block, 1st degree Asymptomatic.           Plan:         1. Continue medications as previously prescribed.  2. Report any worsening symptoms.  3. Report any signs of bleeding.  4. Continue heart healthy diet and regular exercise as tolerated.   5. Follow up with PCP for blood pressure and cholesterol management and routine lab work.  6. Follow up in six months, or sooner if needed.   7.  Monitor and record daily blood pressure. Report readings consistently higher than 130/80 or consistently lower than 100/60.

## 2023-04-13 ENCOUNTER — OFFICE VISIT (OUTPATIENT)
Dept: CARDIOLOGY | Facility: CLINIC | Age: 68
End: 2023-04-13
Payer: MEDICARE

## 2023-04-13 VITALS
SYSTOLIC BLOOD PRESSURE: 118 MMHG | HEIGHT: 68 IN | HEART RATE: 82 BPM | OXYGEN SATURATION: 96 % | WEIGHT: 122 LBS | DIASTOLIC BLOOD PRESSURE: 76 MMHG | BODY MASS INDEX: 18.49 KG/M2

## 2023-04-13 DIAGNOSIS — I45.10 RBBB: Chronic | ICD-10-CM

## 2023-04-13 DIAGNOSIS — I44.0 AV BLOCK, 1ST DEGREE: ICD-10-CM

## 2023-04-13 DIAGNOSIS — I44.4 LAFB (LEFT ANTERIOR FASCICULAR BLOCK): Chronic | ICD-10-CM

## 2023-04-13 DIAGNOSIS — I10 ESSENTIAL HYPERTENSION: Primary | Chronic | ICD-10-CM

## 2023-04-13 PROCEDURE — 99214 OFFICE O/P EST MOD 30 MIN: CPT | Performed by: NURSE PRACTITIONER

## 2023-04-13 PROCEDURE — 1160F RVW MEDS BY RX/DR IN RCRD: CPT | Performed by: NURSE PRACTITIONER

## 2023-04-13 PROCEDURE — 3078F DIAST BP <80 MM HG: CPT | Performed by: NURSE PRACTITIONER

## 2023-04-13 PROCEDURE — 3074F SYST BP LT 130 MM HG: CPT | Performed by: NURSE PRACTITIONER

## 2023-04-13 PROCEDURE — 93000 ELECTROCARDIOGRAM COMPLETE: CPT | Performed by: NURSE PRACTITIONER

## 2023-04-13 PROCEDURE — 1159F MED LIST DOCD IN RCRD: CPT | Performed by: NURSE PRACTITIONER

## 2023-04-13 RX ORDER — ROSUVASTATIN CALCIUM 5 MG/1
5 TABLET, COATED ORAL DAILY
Qty: 90 TABLET | Refills: 3 | Status: SHIPPED | OUTPATIENT
Start: 2023-04-13

## 2023-04-14 DIAGNOSIS — J44.9 COPD, MODERATE: Primary | ICD-10-CM

## 2023-04-14 DIAGNOSIS — F41.9 ANXIETY: ICD-10-CM

## 2023-04-14 RX ORDER — ALPRAZOLAM 0.5 MG/1
0.5 TABLET, EXTENDED RELEASE ORAL EVERY MORNING
Qty: 30 TABLET | Refills: 2 | Status: SHIPPED | OUTPATIENT
Start: 2023-04-14

## 2023-04-14 RX ORDER — FLUTICASONE PROPIONATE AND SALMETEROL 250; 50 UG/1; UG/1
1 POWDER RESPIRATORY (INHALATION) 2 TIMES DAILY
Qty: 60 EACH | Refills: 5 | Status: SHIPPED | OUTPATIENT
Start: 2023-04-14

## 2023-04-14 NOTE — TELEPHONE ENCOUNTER
Caller: Michelle Wilson    Relationship: Self    Best call back number: [phone]    Requested Prescriptions:   Requested Prescriptions     Pending Prescriptions Disp Refills   • ALPRAZolam XR (XANAX XR) 0.5 MG 24 hr tablet 30 tablet 2     Sig: Take 1 tablet by mouth Every Morning.        Pharmacy where request should be sent: [facility] [address] . - [phone]  - [phone] FX     Last office visit with prescribing clinician: 2/23/2023   Last telemedicine visit with prescribing clinician: 8/25/2023   Next office visit with prescribing clinician: 8/25/2023     Additional details provided by patient: HAS ENOUGH TILL TUESDAY    Does the patient have less than a 3 day supply:  [] Yes  [x] No    Would you like a call back once the refill request has been completed: [x] Yes [] No    If the office needs to give you a call back, can they leave a voicemail: [x] Yes [] No    Vanessa Mcmahan Rep   04/14/23 10:24 CDT

## 2023-04-14 NOTE — TELEPHONE ENCOUNTER
Patient called stating that she needed a refill on advair inhaler.    Rx Refill Note  Requested Prescriptions     Pending Prescriptions Disp Refills   • Fluticasone-Salmeterol (Wixela Inhub) 250-50 MCG/ACT DISKUS 60 each 5     Sig: Inhale 1 puff 2 (Two) Times a Day.      Last office visit with prescribing clinician: 11/9/2022   Last telemedicine visit with prescribing clinician: 5/10/2023   Next office visit with prescribing clinician: 5/10/2023                         Would you like a call back once the refill request has been completed: [] Yes [] No    If the office needs to give you a call back, can they leave a voicemail: [] Yes [] No    Elyse Johnson MA  04/14/23, 13:50 CDT

## 2023-05-10 ENCOUNTER — OFFICE VISIT (OUTPATIENT)
Dept: PULMONOLOGY | Facility: CLINIC | Age: 68
End: 2023-05-10
Payer: MEDICARE

## 2023-05-10 ENCOUNTER — HOSPITAL ENCOUNTER (OUTPATIENT)
Dept: CT IMAGING | Facility: HOSPITAL | Age: 68
Discharge: HOME | End: 2023-05-10
Admitting: INTERNAL MEDICINE
Payer: MEDICARE

## 2023-05-10 VITALS
WEIGHT: 123.2 LBS | HEIGHT: 65 IN | BODY MASS INDEX: 20.53 KG/M2 | DIASTOLIC BLOOD PRESSURE: 82 MMHG | HEART RATE: 80 BPM | SYSTOLIC BLOOD PRESSURE: 126 MMHG | OXYGEN SATURATION: 99 %

## 2023-05-10 DIAGNOSIS — J44.9 COPD, MODERATE: ICD-10-CM

## 2023-05-10 DIAGNOSIS — J43.2 CENTRILOBULAR EMPHYSEMA: ICD-10-CM

## 2023-05-10 DIAGNOSIS — U09.9 POST-COVID-19 CONDITION: Chronic | ICD-10-CM

## 2023-05-10 DIAGNOSIS — J44.9 COPD, MODERATE: Chronic | ICD-10-CM

## 2023-05-10 DIAGNOSIS — Z87.891 PERSONAL HISTORY OF NICOTINE DEPENDENCE: Chronic | ICD-10-CM

## 2023-05-10 DIAGNOSIS — R91.8 LUNG NODULES: Primary | Chronic | ICD-10-CM

## 2023-05-10 DIAGNOSIS — J43.2 CENTRILOBULAR EMPHYSEMA: Chronic | ICD-10-CM

## 2023-05-10 DIAGNOSIS — R91.8 LUNG NODULES: ICD-10-CM

## 2023-05-10 PROCEDURE — 71250 CT THORAX DX C-: CPT

## 2023-05-10 NOTE — PROCEDURES
Full Pulmonary Function Test Without Bronchodilator  Performed by: Madison Siddiqui, RRT  Authorized by: Keshawn Martin MD      Pre Drug % Predicted    FVC: 99%   FEV1: 61%   FEF 25-75%: 24%   FEV1/FVC: 48%   T%   RV: 168%   DLCO: 79%   D/VAsb: 74%    Interpretation   Spirometry   Spirometry shows moderate obstruction.   Lung Volume Measurements  Measurements show elevated residual volume consistent with gas trapping.   Diffusion Capacity  The patient's diffusion capacity is mildly reduced.  Diffusion capacity is mildly reduced when corrected for alveolar volume.   Overall comments: The patient's spirometry is consistent with a moderate obstructive ventilatory defect.  Lung volumes reveal hyperinflation.  There is a mild diffusion impairment even when corrected for alveolar volume.  When current studies are compared to studies performed at [facility] on May 3, 2022, the patient's current baseline spirometry reveals a slight drop in her FVC but some improvement in her FEV1 compared to previous prebronchodilator values.  Her current baseline spirometry reveals a decline in her FVC and a slight decline in her FEV1 compared to previous postbronchodilator values.  Her current lung volumes reveal improvement in the degree of hyperinflation compared to previous studies.

## 2023-05-10 NOTE — PATIENT INSTRUCTIONS
The patient's pulmonary function show slight improvement in terms of her spirometry compared to previous studies from last year.  She states she is doing better from a pulmonary standpoint with much less need for her rescue inhaler.  Her chest CT did show some new apical changes which are likely due to inflammation but I will check a Nodify blood test and I went over the rationale of performing the test with the patient today.  I will call with results when available and if they are negative we will just plan on a follow-up visit in 6 months with another chest CT.

## 2023-05-11 NOTE — ASSESSMENT & PLAN NOTE
Again there is a linear density now in the right upper lobe in the left upper lobe nodular density has increased in size so I will get the Nodify study and call her with results.

## 2023-05-11 NOTE — ASSESSMENT & PLAN NOTE
PFTs today are consistent with a moderate obstructive ventilatory defect and again the degree of hyperinflation is less pronounced than on her previous studies and her FEV1 on current baseline spirometry has improved compared to previous prebronchodilator values.  She will continue her Wixela Inhub and her as needed albuterol HFA.

## 2023-05-11 NOTE — PROGRESS NOTES
Chief Complaint  Lung Nodules and COPD    Subjective    History of Present Illness     Michelle Wilson presents to [facility] for nodules and COPD.    History of Present Illness   The patient stated her breathing is doing much better at this time and she rarely has to use her rescue inhaler.  Her pulmonary functions do show a slight drop in her FVC compared to previous prebronchodilator studies from May 3 of last year but improvement in her FEV1.  She still has some underlying hyperinflation but that has improved as well.  Again overall she appears to have improvement clinically and at least on spirometry she has improvement in her FEV1 compared to previous prebronchodilator values and definitely improvement in the degree of hyperinflation.  Her chest CT done earlier today did show some nodular densities in the upper lobe with the one in the right upper lobe more linear and the one in the left upper lobe more spiculated in nature.  These may represent an inflammatory process but I will check a Nodify study today and call her with results.  That nodule has waxed and waned in size and a previous PET scan did not show significant activity but I did tell we will continue to follow this closely and if the Nodify study were positive I will obtain a follow-up PET scan.  If it is negative we will just continue to follow with serial imaging studies.  If it is indeterminate we will consider either a short-term follow-up CT versus a PET scan.  I did go over the rationale performing the study with her and she expressed understanding.  She has a history of skin cancer excised in the past in 2019 in particular but this was a basal cell which should not interfere with the results of the Nodify study.  I will plan on a 6-month follow-up but if the Nodify study is positive or or again possibly even if it is indeterminate we will plan on seeing her back sooner with additional  imaging studies.  She has had the COVID-19 vaccine including 2 boosters in the form of the Moderna vaccine.  She had the flu shot this past flu season and has had both a Prevnar 13 and Pneumovax in the past.  Prior to Admission medications    Medication Sig Start Date End Date Taking? Authorizing Provider   albuterol sulfate  (90 Base) MCG/ACT inhaler Inhale 2 puffs Every 4 (Four) Hours As Needed for Wheezing. 5/6/22  Yes Geo Duggan MD   alendronate (FOSAMAX) 70 MG tablet Take 1 tablet by mouth Every 7 (Seven) Days. 6/27/22  Yes Krissy Cardoso APRN   ALPRAZolam XR (XANAX XR) 0.5 MG 24 hr tablet Take 1 tablet by mouth Every Morning. 4/14/23  Yes Sathya Burden MD   aspirin 81 MG EC tablet Take 1 tablet by mouth Daily.   Yes ProviderSabrina MD   Azelastine HCl 137 MCG/SPRAY solution 2 sprays into the nostril(s) as directed by provider 2 (Two) Times a Day. 5/6/22  Yes Geo Duggan MD   Calcium Carbonate (CALCIUM 600 PO) Take 1 tablet by mouth Daily.   Yes ProviderSabrina MD   etodolac (LODINE) 400 MG tablet TAKE 1 TABLET BY MOUTH TWICE A DAY 8/8/22  Yes Leydi Mon APRN   fluticasone (Flonase Allergy Relief) 50 MCG/ACT nasal spray 2 sprays into the nostril(s) as directed by provider Daily. 5/6/22  Yes Geo Duggan MD   Fluticasone-Salmeterol (Wixela Inhub) 250-50 MCG/ACT DISKUS Inhale 1 puff 2 (Two) Times a Day. 4/14/23  Yes Keshawn Martin MD   lisinopril (PRINIVIL,ZESTRIL) 20 MG tablet Take 1 tablet by mouth Daily. 9/29/22  Yes Krissy Cardoso APRN   loratadine (CLARITIN) 10 MG tablet Take 1 tablet by mouth Daily. 5/6/22  Yes Geo Duggan MD   Multiple Vitamins-Minerals (MULTIVITAMIN ADULT PO) Take 1 tablet by mouth Daily.   Yes ProviderSabrina MD   Omega 3 1200 MG capsule Take 1 tablet by mouth Daily.   Yes Provider, MD Sabrina   rosuvastatin (CRESTOR) 5 MG tablet Take 1 tablet by mouth Daily. 4/13/23  Yes Jessica Magana, APRN  "  vitamin C (ASCORBIC ACID) 500 MG tablet Take 1 tablet by mouth Daily.   Yes ProviderSabrina MD   VITAMIN E PO Take 1 capsule by mouth Daily.   Yes Sabrina Garcia MD   Zinc 50 MG tablet Take 1 tablet by mouth Daily.   Yes Sabrina Garcia MD       Social History     Socioeconomic History   • Marital status:    Tobacco Use   • Smoking status: Former     Packs/day: 1.00     Years: 49.00     Pack years: 49.00     Types: Cigarettes     Start date:      Quit date: 2022     Years since quittin.1     Passive exposure: Never   • Smokeless tobacco: Never   Vaping Use   • Vaping Use: Never used   Substance and Sexual Activity   • Alcohol use: Not Currently     Comment: occ   • Drug use: Never   • Sexual activity: Not Currently       Objective   Vital Signs:   /82   Pulse 80   Ht 165.1 cm (65\")   Wt 55.9 kg (123 lb 3.2 oz)   SpO2 99% Comment: RA  BMI 20.50 kg/m²     Physical Exam  Vitals and nursing note reviewed.   HENT:      Head: Normocephalic.   Eyes:      Extraocular Movements: Extraocular movements intact.      Pupils: Pupils are equal, round, and reactive to light.   Cardiovascular:      Rate and Rhythm: Normal rate and regular rhythm.   Pulmonary:      Effort: Pulmonary effort is normal.      Comments: She has reasonable air movement bilaterally with no adventitious sounds heard.  Musculoskeletal:         General: Normal range of motion.   Skin:     General: Skin is warm and dry.   Neurological:      General: No focal deficit present.      Mental Status: She is alert and oriented to person, place, and time.   Psychiatric:         Mood and Affect: Mood normal.         Behavior: Behavior normal.        Result Review :    PFT Values        5/10/2023    15:30   Pre Drug PFT Results   FVC 99   FEV1 61   FEF 25-75% 24   FEV1/FVC 48   Other Tests PFT Results         DLCO 79   D/VAsb 74         Results for orders placed in visit on 05/10/23    Full Pulmonary " Function Test Without Bronchodilator    Narrative  Full Pulmonary Function Test Without Bronchodilator  Performed by: Madison Siddiqui, RRT  Authorized by: Keshawn Martin MD    Pre Drug % Predicted  FVC: 99%  FEV1: 61%  FEF 25-75%: 24%  FEV1/FVC: 48%  T%  RV: 168%  DLCO: 79%  D/VAsb: 74%    Interpretation  Spirometry  Spirometry shows moderate obstruction.  Lung Volume Measurements  Measurements show elevated residual volume consistent with gas trapping.  Diffusion Capacity  The patient's diffusion capacity is mildly reduced.  Diffusion capacity is mildly reduced when corrected for alveolar volume.  Overall comments: The patient's spirometry is consistent with a moderate obstructive ventilatory defect.  Lung volumes reveal hyperinflation.  There is a mild diffusion impairment even when corrected for alveolar volume.  When current studies are compared to studies performed at  on May 3, 2022, the patient's current baseline spirometry reveals a slight drop in her FVC but some improvement in her FEV1 compared to previous prebronchodilator values.  Her current baseline spirometry reveals a decline in her FVC and a slight decline in her FEV1 compared to previous postbronchodilator values.  Her current lung volumes reveal improvement in the degree of hyperinflation compared to previous studies.      Results for orders placed during the hospital encounter of 22    Full Pulmonary Function Test With Bronchodilator    Narrative   - Pulmonary Function Test    [facility] [place]  [place]  [address]  [phone]    Patient : Edel Wilson  MRN : [id number]  CSN : [id number]  Pulmonologist : Keshawn Martin MD  Date : 5/3/2022    ______________________________________________________________________    Interpretation :  1.  Spirometry is consistent with a moderate bordering on severe obstructive ventilatory defect.  2.  There is improvement in spirometry  postbronchodilator but a moderate obstructive ventilatory defect is still present.  3.  Lung volumes reveal hyperinflation.      Keshawn Martin MD                 My interpretation of imaging:   CT Chest Without Contrast Diagnostic (05/10/2023 12:15)  CT Chest Without Contrast Diagnostic (11/09/2022 12:34)          Assessment and Plan     Diagnoses and all orders for this visit:    1. Lung nodules (Primary)  Assessment & Plan:  Again there is a linear density now in the right upper lobe in the left upper lobe nodular density has increased in size so I will get the Nodify study and call her with results.    Orders:  -     NodifyLung    2. COPD, moderate  Assessment & Plan:  PFTs today are consistent with a moderate obstructive ventilatory defect and again the degree of hyperinflation is less pronounced than on her previous studies and her FEV1 on current baseline spirometry has improved compared to previous prebronchodilator values.  She will continue her Wixela Inhub and her as needed albuterol HFA.        3. Centrilobular emphysema  Assessment & Plan:  This is noted on her chest CT scans and is associated with her COPD.        4. Post-COVID-19 condition  Assessment & Plan:  Again some of the nodular changes on her CT may be residual from her prior COVID-19 infection.      5. Personal history of nicotine dependence  Assessment & Plan:  She quit smoking in March 2022.          Keshawn Martin MD  5/10/2023  21:33 CDT    Follow Up   Return in about 6 months (around 11/10/2023) for To see me specifically.    Patient was given instructions and counseling regarding her condition or for health maintenance advice. Please see specific information pulled into the AVS if appropriate.

## 2023-05-19 DIAGNOSIS — R91.8 LUNG NODULES: Primary | ICD-10-CM

## 2023-06-01 DIAGNOSIS — M81.0 AGE-RELATED OSTEOPOROSIS WITHOUT CURRENT PATHOLOGICAL FRACTURE: ICD-10-CM

## 2023-06-01 RX ORDER — ALENDRONATE SODIUM 70 MG/1
70 TABLET ORAL
Qty: 12 TABLET | Refills: 3 | Status: SHIPPED | OUTPATIENT
Start: 2023-06-01

## 2023-06-01 RX ORDER — ETODOLAC 400 MG/1
400 TABLET, FILM COATED ORAL 2 TIMES DAILY
Qty: 180 TABLET | Refills: 3 | Status: SHIPPED | OUTPATIENT
Start: 2023-06-01

## 2023-06-01 NOTE — TELEPHONE ENCOUNTER
Caller: Michelle Wilson    Relationship: Self    Best call back number: [phone]    Requested Prescriptions:   Requested Prescriptions     Pending Prescriptions Disp Refills   • etodolac (LODINE) 400 MG tablet 60 tablet 2     Sig: Take 1 tablet by mouth 2 (Two) Times a Day.   • alendronate (FOSAMAX) 70 MG tablet 12 tablet 3     Sig: Take 1 tablet by mouth Every 7 (Seven) Days.        Pharmacy where request should be sent: [place] [id number] [address] - [phone] [facility] [phone] FX     Last office visit with prescribing clinician: 2/23/2023   Last telemedicine visit with prescribing clinician: Visit date not found   Next office visit with prescribing clinician: 8/25/2023     Additional details provided by patient: HAS MORE THEN 3 LEFT    Does the patient have less than a 3 day supply:  [] Yes  [x] No    Would you like a call back once the refill request has been completed: [x] Yes [] No    If the office needs to give you a call back, can they leave a voicemail: [x] Yes [] No    Vanessa Mcmahan Rep   06/01/23 09:30 CDT

## 2023-08-25 ENCOUNTER — OFFICE VISIT (OUTPATIENT)
Dept: INTERNAL MEDICINE | Facility: CLINIC | Age: 68
End: 2023-08-25
Payer: MEDICARE

## 2023-08-25 VITALS
SYSTOLIC BLOOD PRESSURE: 140 MMHG | WEIGHT: 120 LBS | HEART RATE: 69 BPM | DIASTOLIC BLOOD PRESSURE: 76 MMHG | RESPIRATION RATE: 18 BRPM | BODY MASS INDEX: 19.99 KG/M2 | OXYGEN SATURATION: 100 % | HEIGHT: 65 IN | TEMPERATURE: 98 F

## 2023-08-25 DIAGNOSIS — I10 ESSENTIAL HYPERTENSION: Primary | Chronic | ICD-10-CM

## 2023-08-25 DIAGNOSIS — E78.00 ELEVATED CHOLESTEROL: ICD-10-CM

## 2023-08-25 DIAGNOSIS — B07.9 WART OF FACE: ICD-10-CM

## 2023-08-25 DIAGNOSIS — F41.9 ANXIETY: ICD-10-CM

## 2023-08-25 PROCEDURE — 3077F SYST BP >= 140 MM HG: CPT

## 2023-08-25 PROCEDURE — 1159F MED LIST DOCD IN RCRD: CPT

## 2023-08-25 PROCEDURE — 3078F DIAST BP <80 MM HG: CPT

## 2023-08-25 PROCEDURE — 99214 OFFICE O/P EST MOD 30 MIN: CPT

## 2023-08-25 PROCEDURE — 1160F RVW MEDS BY RX/DR IN RCRD: CPT

## 2023-08-25 NOTE — PROGRESS NOTES
Subjective   Michelle Wilson is a 68 y.o. female.   Chief Complaint   Patient presents with    Hypertension     6 month follow-up.      Anxiety    Hyperlipidemia     Patient is fasting.       History of Present Illness   Michelle presents to the office today for 6-month follow-up and management of anxiety, hypertension and hyperlipidemia  She reports her blood pressures have been running normotensive at home, typically 120s over 80s.  No reports of chest pain.  She has had an increased incidence of some allergies but overall states that her breathing is without complication, she continues on maintenance inhaler and continues to follow with pulmonology.  She states that her anxiety continues to be well controlled with use of Xanax 0.5 mg once daily, she states that there are no negative side effects and the medication helps keep her calm, without it she states that she gets irritable very easily.  She continues to adhere to a keto diets, states that she also recently picked up a part-time job so that she could get out of the house more often and socialize.  She states the job is very flexible and has been a great experience overall.  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  The following portions of the patient's history were reviewed and updated as appropriate: allergies, current medications, past family history, past medical history, past social history, past surgical history and problem list.    Review of Systems    Objective   Past Medical History:   Diagnosis Date    Anxiety     Arthritis     Bursitis and tendinitis of shoulder region     Left;  w/ small, partial tear to tendon.    Cancer     skin    Depression     HYATT (dyspnea on exertion)     Family history of early CAD     Full dentures     Hypertension     LAFB (left anterior fascicular block)     RBBB       Past Surgical History:   Procedure Laterality Date    BREAST BIOPSY Right      CHOLECYSTECTOMY  1978    SKIN CANCER EXCISION  2019    TUBAL ABDOMINAL LIGATION Bilateral         Current Outpatient Medications:     albuterol sulfate  (90 Base) MCG/ACT inhaler, Inhale 2 puffs Every 4 (Four) Hours As Needed for Wheezing., Disp: 6.7 g, Rfl: 5    alendronate (FOSAMAX) 70 MG tablet, Take 1 tablet by mouth Every 7 (Seven) Days., Disp: 12 tablet, Rfl: 3    ALPRAZolam XR (XANAX XR) 0.5 MG 24 hr tablet, Take 1 tablet by mouth Every Morning., Disp: 30 tablet, Rfl: 2    aspirin 81 MG EC tablet, Take 1 tablet by mouth Daily., Disp: , Rfl:     Azelastine HCl 137 MCG/SPRAY solution, 2 sprays into the nostril(s) as directed by provider 2 (Two) Times a Day., Disp: 30 mL, Rfl: 11    Calcium Carbonate (CALCIUM 600 PO), Take 1 tablet by mouth Daily., Disp: , Rfl:     etodolac (LODINE) 400 MG tablet, Take 1 tablet by mouth 2 (Two) Times a Day., Disp: 180 tablet, Rfl: 3    fluticasone (Flonase Allergy Relief) 50 MCG/ACT nasal spray, 2 sprays into the nostril(s) as directed by provider Daily., Disp: 16 g, Rfl: 11    Fluticasone-Salmeterol (Wixela Inhub) 250-50 MCG/ACT DISKUS, Inhale 1 puff 2 (Two) Times a Day., Disp: 60 each, Rfl: 5    lisinopril (PRINIVIL,ZESTRIL) 20 MG tablet, Take 1 tablet by mouth Daily., Disp: 90 tablet, Rfl: 3    loratadine (CLARITIN) 10 MG tablet, Take 1 tablet by mouth Daily., Disp: 90 tablet, Rfl: 3    Multiple Vitamins-Minerals (MULTIVITAMIN ADULT PO), Take 1 tablet by mouth Daily., Disp: , Rfl:     Omega 3 1200 MG capsule, Take 1 tablet by mouth Daily., Disp: , Rfl:     rosuvastatin (CRESTOR) 5 MG tablet, Take 1 tablet by mouth Daily., Disp: 90 tablet, Rfl: 3    vitamin C (ASCORBIC ACID) 500 MG tablet, Take 1 tablet by mouth Daily., Disp: , Rfl:     VITAMIN E PO, Take 1 capsule by mouth Daily., Disp: , Rfl:     Zinc 50 MG tablet, Take 1 tablet by mouth Daily., Disp: , Rfl:       /76 (BP Location: Left arm, Patient Position: Sitting, Cuff Size: Adult)   Pulse 69   Temp 98 øF  "(36.7 øC) (Infrared)   Resp 18   Ht 165.1 cm (65\")   Wt 54.4 kg (120 lb)   LMP  (LMP Unknown)   SpO2 100%   BMI 19.97 kg/mý      Body mass index is 19.97 kg/mý.  BMI is within normal parameters. No other follow-up for BMI required.       Physical Exam  Vitals and nursing note reviewed.   Constitutional:       Appearance: Normal appearance.   HENT:      Head: Normocephalic and atraumatic.        Comments: Wart, benign appearing   Eyes:      Extraocular Movements: Extraocular movements intact.      Conjunctiva/sclera: Conjunctivae normal.      Pupils: Pupils are equal, round, and reactive to light.   Cardiovascular:      Rate and Rhythm: Normal rate and regular rhythm.      Pulses: Normal pulses.      Heart sounds: Normal heart sounds.   Pulmonary:      Effort: Pulmonary effort is normal. No respiratory distress.      Breath sounds: Normal breath sounds. No wheezing.   Abdominal:      General: Bowel sounds are normal.      Palpations: Abdomen is soft.   Musculoskeletal:         General: Normal range of motion.      Cervical back: Normal range of motion and neck supple.   Skin:     General: Skin is warm and dry.   Neurological:      General: No focal deficit present.      Mental Status: She is alert and oriented to person, place, and time. Mental status is at baseline.   Psychiatric:         Mood and Affect: Mood normal.         Behavior: Behavior normal.         Thought Content: Thought content normal.         Judgment: Judgment normal.             Assessment & Plan   Diagnoses and all orders for this visit:    1. Essential hypertension (Primary)  -     Basic metabolic panel    2. Anxiety    3. Elevated cholesterol  -     Lipid panel    4. Wart of face               Plan of care reviewed with Michelle  She did having a lesion beside the right ear that she wanted me to look at, appears to be a benign wart, cryotherapy was applied.  Advised that she may need additional treatment for complete removal, will readdress " at her next visit or she can be seen prior to that if she wants to.  Advised to keep this area out of the sun.  Continue with use of sunscreen when outside  Anxiety is currently well controlled with use of Xanax 0.5 mg daily, she is up-to-date on her CSA and UDS, continue with current treatment.  Blood pressure today is little bit elevated at 140/76 however is running normotensive at home 120s over 80s, will continue with lisinopril 20 mg daily.  Recheck BMP today, she has had previously had GFR just below 60.  Continue with adequate hydration, avoid NSAIDs if able.  Hyperlipidemia on last assessment revealed HDL of 149, she has been cutting back on some of her fats and increased physical exercise, reassess today as she is fasting  Discussed the new COVID-vaccine that will be available end of September, recommended getting it at the beginning of October along with influenza vaccine, she states she will return to the office for this.  Unless otherwise indicated we will return to the office in 6 months for her Medicare wellness.    Cryotherapy, Skin Lesion    Date/Time: 8/25/2023 9:52 AM  Performed by: Krissy Cardoso APRN  Authorized by: Krissy Cardoso APRN   Preparation: Patient was prepped and draped in the usual sterile fashion.  Local anesthesia used: no    Anesthesia:  Local anesthesia used: no    Sedation:  Patient sedated: no    Patient tolerance: patient tolerated the procedure well with no immediate complications         Please note that portions of this note were completed with a voice recognition program.     Electronically signed by LUAN Lemus, 08/25/23, 09:51 CDT.

## 2023-08-26 LAB
BUN SERPL-MCNC: 29 MG/DL (ref 8–23)
BUN/CREAT SERPL: 33 (ref 7–25)
CALCIUM SERPL-MCNC: 9.6 MG/DL (ref 8.6–10.5)
CHLORIDE SERPL-SCNC: 100 MMOL/L (ref 98–107)
CHOLEST SERPL-MCNC: 174 MG/DL (ref 0–200)
CO2 SERPL-SCNC: 25.6 MMOL/L (ref 22–29)
CREAT SERPL-MCNC: 0.88 MG/DL (ref 0.57–1)
EGFRCR SERPLBLD CKD-EPI 2021: 71.2 ML/MIN/1.73
GLUCOSE SERPL-MCNC: 76 MG/DL (ref 65–99)
HDLC SERPL-MCNC: 128 MG/DL (ref 40–60)
LDLC SERPL CALC-MCNC: 38 MG/DL (ref 0–100)
POTASSIUM SERPL-SCNC: 4.5 MMOL/L (ref 3.5–5.2)
SODIUM SERPL-SCNC: 140 MMOL/L (ref 136–145)
TRIGL SERPL-MCNC: 30 MG/DL (ref 0–150)
VLDLC SERPL CALC-MCNC: 8 MG/DL (ref 5–40)

## 2023-08-28 NOTE — PROGRESS NOTES
Your basic metabolic panel is normal except for slight elevation in BUN, would recommend incorporating more water.  Cholesterol has improved overall, total cholesterol is 174, HDL was previously 149 is now 128, keep up the great work.

## 2023-08-30 ENCOUNTER — HOSPITAL ENCOUNTER (OUTPATIENT)
Dept: CT IMAGING | Facility: HOSPITAL | Age: 68
Discharge: HOME | End: 2023-08-30
Admitting: INTERNAL MEDICINE
Payer: MEDICARE

## 2023-08-30 ENCOUNTER — OFFICE VISIT (OUTPATIENT)
Dept: PULMONOLOGY | Facility: CLINIC | Age: 68
End: 2023-08-30
Payer: MEDICARE

## 2023-08-30 VITALS
HEIGHT: 65 IN | DIASTOLIC BLOOD PRESSURE: 82 MMHG | OXYGEN SATURATION: 99 % | BODY MASS INDEX: 20.06 KG/M2 | SYSTOLIC BLOOD PRESSURE: 124 MMHG | HEART RATE: 79 BPM | WEIGHT: 120.4 LBS

## 2023-08-30 DIAGNOSIS — U09.9 POST-COVID-19 CONDITION: Chronic | ICD-10-CM

## 2023-08-30 DIAGNOSIS — R91.8 LUNG NODULES: Primary | Chronic | ICD-10-CM

## 2023-08-30 DIAGNOSIS — R91.8 LUNG NODULES: ICD-10-CM

## 2023-08-30 DIAGNOSIS — J43.2 CENTRILOBULAR EMPHYSEMA: Chronic | ICD-10-CM

## 2023-08-30 DIAGNOSIS — Z87.891 PERSONAL HISTORY OF NICOTINE DEPENDENCE: Chronic | ICD-10-CM

## 2023-08-30 DIAGNOSIS — J44.9 COPD, MODERATE: Chronic | ICD-10-CM

## 2023-08-30 PROCEDURE — 71250 CT THORAX DX C-: CPT

## 2023-08-30 NOTE — ASSESSMENT & PLAN NOTE
Her Nodify CDT test was negative and her XL2 test was indeterminate and I had notified her of these by phone and we will continue to follow her with serial imaging studies.

## 2023-08-30 NOTE — PROGRESS NOTES
Chief Complaint  Lung nodules and COPD    Subjective    History of Present Illness {CC  Problem List  Visit Diagnosis   Encounters  Notes  Medications  Labs  Result Review Imaging  Media: 23}    Michelle Wilson presents to [facility] for lung nodules and COPD.    History of Present Illness   The patient's chest CT showed slight improvement in her 2 larger nodules which were approximate 8 mm in size.  I did review the scan with her.  There were some new small nodules noted largest of which was 4 mm in size and slight increase in the size of a 2 mm right middle lobe nodule to 4 mm.  I did tell the patient that we will plan on follow-up CT in several months and we could perform a PET scan in the future but generally this was a limited utility for nodules under 8 mm in size.  She expressed understanding of this plan.  I told her if any of these nodules did show a significant increase in size where a PET scan could be considered and the PET scan showed positivity an option certainly would be stereotactic radiation therapy based on her coexisting COPD.  Again I reviewed the scan with her and went over these plans with her and she expressed understanding.  She has had the COVID-19 vaccine including two standard boosters in the form of the Moderna vaccine.  She had the flu shot this past fall and has had both a Prevnar 13 and Pneumovax in the past as well.  Prior to Admission medications    Medication Sig Start Date End Date Taking? Authorizing Provider   albuterol sulfate  (90 Base) MCG/ACT inhaler Inhale 2 puffs Every 4 (Four) Hours As Needed for Wheezing. 5/6/22  Yes Geo Duggan MD   alendronate (FOSAMAX) 70 MG tablet Take 1 tablet by mouth Every 7 (Seven) Days. 6/1/23  Yes Krissy Cardoso APRN   ALPRAZolam XR (XANAX XR) 0.5 MG 24 hr tablet Take 1 tablet by mouth Every Morning. 7/12/23  Yes Sathya Burden MD   aspirin 81 MG EC tablet  Take 1 tablet by mouth Daily.   Yes Sabrina Garcia MD   Azelastine HCl 137 MCG/SPRAY solution 2 sprays into the nostril(s) as directed by provider 2 (Two) Times a Day. 22  Yes Geo Duggan MD   Calcium Carbonate (CALCIUM 600 PO) Take 1 tablet by mouth Daily.   Yes Sabrina Garcia MD   etodolac (LODINE) 400 MG tablet Take 1 tablet by mouth 2 (Two) Times a Day. 23  Yes Krissy Cardoso APRN   fluticasone (Flonase Allergy Relief) 50 MCG/ACT nasal spray 2 sprays into the nostril(s) as directed by provider Daily. 22  Yes Geo Duggan MD   Fluticasone-Salmeterol (Wixela Inhub) 250-50 MCG/ACT DISKUS Inhale 1 puff 2 (Two) Times a Day. 23  Yes Keshawn Martin MD   lisinopril (PRINIVIL,ZESTRIL) 20 MG tablet Take 1 tablet by mouth Daily. 22  Yes Krissy Cardoso APRN   loratadine (CLARITIN) 10 MG tablet Take 1 tablet by mouth Daily. 22  Yes Geo Duggan MD   Multiple Vitamins-Minerals (MULTIVITAMIN ADULT PO) Take 1 tablet by mouth Daily.   Yes Sabrina Garcia MD   Omega 3 1200 MG capsule Take 1 tablet by mouth Daily.   Yes Sabrina Garcia MD   rosuvastatin (CRESTOR) 5 MG tablet Take 1 tablet by mouth Daily. 23  Yes Jessica Magana APRN   vitamin C (ASCORBIC ACID) 500 MG tablet Take 1 tablet by mouth Daily.   Yes Sabrina Garcia MD   VITAMIN E PO Take 1 capsule by mouth Daily.   Yes Sabrina Garcia MD   Zinc 50 MG tablet Take 1 tablet by mouth Daily.   Yes Sabrina Garcia MD       Social History     Socioeconomic History    Marital status:    Tobacco Use    Smoking status: Former     Packs/day: 1.00     Years: 49.00     Pack years: 49.00     Types: Cigarettes     Start date:      Quit date: 2022     Years since quittin.5     Passive exposure: Never    Smokeless tobacco: Never   Vaping Use    Vaping Use: Never used   Substance and Sexual Activity    Alcohol use: Not Currently     Comment: occ    Drug  "use: Never    Sexual activity: Not Currently       Objective   Vital Signs:   /82   Pulse 79   Ht 165.1 cm (65\")   Wt 54.6 kg (120 lb 6.4 oz)   SpO2 99% Comment: RA  BMI 20.04 kg/mý     Physical Exam  Vitals and nursing note reviewed.   HENT:      Head: Normocephalic.   Eyes:      Extraocular Movements: Extraocular movements intact.      Pupils: Pupils are equal, round, and reactive to light.   Cardiovascular:      Rate and Rhythm: Normal rate and regular rhythm.   Pulmonary:      Effort: Pulmonary effort is normal.      Comments: Breath sounds are diminished but no adventitious sounds are heard.  Musculoskeletal:         General: Normal range of motion.   Skin:     General: Skin is warm and dry.   Neurological:      General: No focal deficit present.      Mental Status: She is alert and oriented to person, place, and time.   Psychiatric:         Mood and Affect: Mood normal.         Behavior: Behavior normal.      Result Review :    PFT Values          5/10/2023    15:30   Pre Drug PFT Results   FVC 99   FEV1 61   FEF 25-75% 24   FEV1/FVC 48   Other Tests PFT Results         DLCO 79   D/VAsb 74         Results for orders placed in visit on 05/10/23    Full Pulmonary Function Test Without Bronchodilator    Narrative  Full Pulmonary Function Test Without Bronchodilator  Performed by: Madison Siddiqui, RRT  Authorized by: Keshawn Martin MD    Pre Drug % Predicted  FVC: 99%  FEV1: 61%  FEF 25-75%: 24%  FEV1/FVC: 48%  T%  RV: 168%  DLCO: 79%  D/VAsb: 74%    Interpretation  Spirometry  Spirometry shows moderate obstruction.  Lung Volume Measurements  Measurements show elevated residual volume consistent with gas trapping.  Diffusion Capacity  The patient's diffusion capacity is mildly reduced.  Diffusion capacity is mildly reduced when corrected for alveolar volume.  Overall comments: The patient's spirometry is consistent with a moderate obstructive ventilatory defect.  Lung " volumes reveal hyperinflation.  There is a mild diffusion impairment even when corrected for alveolar volume.  When current studies are compared to studies performed at [facility] on May 3, 2022, the patient's current baseline spirometry reveals a slight drop in her FVC but some improvement in her FEV1 compared to previous prebronchodilator values.  Her current baseline spirometry reveals a decline in her FVC and a slight decline in her FEV1 compared to previous postbronchodilator values.  Her current lung volumes reveal improvement in the degree of hyperinflation compared to previous studies.      Results for orders placed during the hospital encounter of 05/03/22    Full Pulmonary Function Test With Bronchodilator    Narrative  [facility] - Pulmonary Function Test    [facility]  [place]  [address]  [phone]    Patient : Edel Wilson  MRN : [id number]  CSN : [id number]  Pulmonologist : Keshawn Martin MD  Date : 5/3/2022    ______________________________________________________________________    Interpretation :  1.  Spirometry is consistent with a moderate bordering on severe obstructive ventilatory defect.  2.  There is improvement in spirometry postbronchodilator but a moderate obstructive ventilatory defect is still present.  3.  Lung volumes reveal hyperinflation.      Keshawn Martin MD                 My interpretation of imaging:    CT Chest Without Contrast Diagnostic (08/30/2023 11:16)   My interpretation of labs:   SCANNED - LABS (05/10/2023 00:00)   SCANNED - LABS (05/10/2023 00:00)   Assessment and Plan {CC Problem List  Visit Diagnosis  ROS  Review (Popup)  Health Maintenance  Quality  BestPractice  Medications  SmartSets  SnapShot Encounters  Media : 23}    Diagnoses and all orders for this visit:    1. Lung nodules (Primary)  Assessment & Plan:  Her Nodify CDT test was negative and her XL2 test was indeterminate and I had notified her of  these by phone and we will continue to follow her with serial imaging studies.    Orders:  -     CT Chest Without Contrast Diagnostic; Future    2. COPD, moderate  Assessment & Plan:  She will continue her Wixela Inhub and as needed albuterol HFA inhaler.          3. Centrilobular emphysema  Assessment & Plan:  This was again noted on her chest CT and is associated with her COPD.        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      5. Personal history of nicotine dependence  Assessment & Plan:  She quit smoking in late February 2022.            Keshawn Martin MD  8/30/2023  18:10 CDT    Follow Up   Return in about 20 weeks (around 1/17/2024) for To see me specifically.    Patient was given instructions and counseling regarding her condition or for health maintenance advice. Please see specific information pulled into the AVS if appropriate.

## 2023-08-30 NOTE — PATIENT INSTRUCTIONS
Reviewed the patient's chest CT with her.  Her larger 8 mm nodules had shown a slight decrease in size.  A few small new nodules were noted and the plan will be to obtain a follow-up CT in several months.

## 2023-08-30 NOTE — ASSESSMENT & PLAN NOTE
It is possible some of the nodular changes noted on her chest CT are related to her prior COVID-19 infection and again she will be followed with serial CT scans.

## 2023-10-16 DIAGNOSIS — F41.9 ANXIETY: ICD-10-CM

## 2023-10-16 RX ORDER — ALPRAZOLAM 0.5 MG/1
0.5 TABLET, EXTENDED RELEASE ORAL EVERY MORNING
Qty: 30 TABLET | Refills: 5 | Status: SHIPPED | OUTPATIENT
Start: 2023-10-16

## 2023-10-16 NOTE — TELEPHONE ENCOUNTER
[facility] patient    Last OV 8/25 w/[facility]  Next OV 2/26 w/[facility]  UTD on UDS, not UTD on CSA, note put on next OV to collect

## 2023-10-16 NOTE — TELEPHONE ENCOUNTER
Caller: Michelle Wilson    Relationship: Self    Best call back number: [phone]     Requested Prescriptions:   Requested Prescriptions     Pending Prescriptions Disp Refills    ALPRAZolam XR (XANAX XR) 0.5 MG 24 hr tablet 30 tablet 2     Sig: Take 1 tablet by mouth Every Morning.        Pharmacy where request should be sent: [facility] [address] . - [phone]  - [phone]     Last office visit with prescribing clinician: 8/25/2023   Last telemedicine visit with prescribing clinician: Visit date not found   Next office visit with prescribing clinician: 2/26/2024     Additional details provided by patient: TOOK LAST ONE THIS MORNING     Does the patient have less than a 3 day supply:  [x] Yes  [] No    Would you like a call back once the refill request has been completed: [x] Yes [] No    If the office needs to give you a call back, can they leave a voicemail: [x] Yes [] No    Vanessa Kenny Rep   10/16/23 09:16 CDT

## 2023-10-18 NOTE — PROGRESS NOTES
Subjective:     Encounter Date: 10/19/2023      Patient ID: Michelle Wilson is a 68 y.o. female     HPI: This patient presents today for routine follow-up. She has hypertension, left anterior fascicular block, right bundle branch block, anxiety, depression, skin cancer and former tobacco use. She reports improvement in shortness of breath since smoking cessation. Patient denies chest pain, palpitations, dizziness, syncope, orthopnea, PND, edema or decreased stamina.  Patient denies any signs of bleeding.    Chief Complaint: Routine follow-up  Hypertension  This is a chronic problem. The current episode started more than 1 year ago. The problem is controlled. Pertinent negatives include no anxiety, blurred vision, chest pain, headaches, malaise/fatigue, neck pain, orthopnea, palpitations, peripheral edema, PND, shortness of breath or sweats. Risk factors for coronary artery disease include post-menopausal state. Current antihypertension treatment includes ACE inhibitors. The current treatment provides significant improvement.     I have reviewed and confirmed the accuracy of the HPI LUAN Anderson      Previous Cardiac Testing:  Results for orders placed during the hospital encounter of 09/25/20    Adult Transthoracic Echo Complete W/ Cont if Necessary Per Protocol    Interpretation Summary  · Left ventricular ejection fraction appears to be 61 - 65%. Left ventricular systolic function is normal.  · Left ventricular diastolic function was normal  · Estimated right ventricular systolic pressure from tricuspid regurgitation is normal (<35 mmHg).        The following portions of the patient's history were reviewed and updated as appropriate: allergies, current medications, past family history, past medical history, past social history, past surgical history and problem list.    No Known Allergies    Current Outpatient Medications:     albuterol sulfate  (90 Base) MCG/ACT inhaler, Inhale 2 puffs Every 4  (Four) Hours As Needed for Wheezing., Disp: 6.7 g, Rfl: 5    alendronate (FOSAMAX) 70 MG tablet, Take 1 tablet by mouth Every 7 (Seven) Days., Disp: 12 tablet, Rfl: 3    ALPRAZolam XR (XANAX XR) 0.5 MG 24 hr tablet, Take 1 tablet by mouth Every Morning., Disp: 30 tablet, Rfl: 5    aspirin 81 MG EC tablet, Take 1 tablet by mouth Daily., Disp: , Rfl:     Azelastine HCl 137 MCG/SPRAY solution, 2 sprays into the nostril(s) as directed by provider 2 (Two) Times a Day. (Patient taking differently: 2 sprays into the nostril(s) as directed by provider As Needed.), Disp: 30 mL, Rfl: 11    Calcium Carbonate (CALCIUM 600 PO), Take 1 tablet by mouth Daily., Disp: , Rfl:     etodolac (LODINE) 400 MG tablet, Take 1 tablet by mouth 2 (Two) Times a Day., Disp: 180 tablet, Rfl: 3    Flaxseed, Linseed, (FLAX SEED OIL PO), Take  by mouth., Disp: , Rfl:     fluticasone (Flonase Allergy Relief) 50 MCG/ACT nasal spray, 2 sprays into the nostril(s) as directed by provider Daily., Disp: 16 g, Rfl: 11    Fluticasone-Salmeterol (Wixela Inhub) 250-50 MCG/ACT DISKUS, Inhale 1 puff 2 (Two) Times a Day., Disp: 60 each, Rfl: 5    lisinopril (PRINIVIL,ZESTRIL) 20 MG tablet, Take 1 tablet by mouth Daily., Disp: 90 tablet, Rfl: 3    loratadine (CLARITIN) 10 MG tablet, Take 1 tablet by mouth Daily., Disp: 90 tablet, Rfl: 3    Multiple Vitamins-Minerals (MULTIVITAMIN ADULT PO), Take 1 tablet by mouth Daily., Disp: , Rfl:     rosuvastatin (CRESTOR) 5 MG tablet, Take 1 tablet by mouth Daily., Disp: 90 tablet, Rfl: 3    vitamin C (ASCORBIC ACID) 500 MG tablet, Take 1 tablet by mouth Daily., Disp: , Rfl:     VITAMIN E PO, Take 1 capsule by mouth Daily., Disp: , Rfl:     Zinc 50 MG tablet, Take 1 tablet by mouth Daily., Disp: , Rfl:   Past Medical History:   Diagnosis Date    Anxiety     Arthritis     Bursitis and tendinitis of shoulder region     Left;  w/ small, partial tear to tendon.    Cancer     skin    Depression     HYATT (dyspnea on exertion)      Family history of early CAD     Full dentures     Hypertension     LAFB (left anterior fascicular block)     RBBB      Past Surgical History:   Procedure Laterality Date    BREAST BIOPSY Right     CHOLECYSTECTOMY      SKIN CANCER EXCISION  2019    TUBAL ABDOMINAL LIGATION Bilateral      Family History   Problem Relation Age of Onset    Arthritis Mother     Hypertension Mother     Depression Mother     Cancer Maternal Grandmother     Arthritis Paternal Grandmother     Cancer Paternal Grandmother     Breast cancer Paternal Grandmother     Cancer Other      Social History     Socioeconomic History    Marital status:    Tobacco Use    Smoking status: Former     Packs/day: 1.00     Years: 49.00     Additional pack years: 0.00     Total pack years: 49.00     Types: Cigarettes     Start date:      Quit date: 2022     Years since quittin.6     Passive exposure: Never    Smokeless tobacco: Never   Vaping Use    Vaping Use: Never used   Substance and Sexual Activity    Alcohol use: Not Currently     Comment: occ    Drug use: Never    Sexual activity: Not Currently       Review of Systems   Constitutional: Negative for chills, decreased appetite, fever, malaise/fatigue, night sweats, weight gain and weight loss.   HENT:  Negative for nosebleeds.    Eyes:  Negative for blurred vision and visual disturbance.   Cardiovascular:  Negative for chest pain, dyspnea on exertion, leg swelling, near-syncope, orthopnea, palpitations, paroxysmal nocturnal dyspnea and syncope.   Respiratory:  Negative for cough, hemoptysis, shortness of breath, snoring and wheezing.    Endocrine: Negative for cold intolerance and heat intolerance.   Hematologic/Lymphatic: Does not bruise/bleed easily.   Skin:  Negative for rash.   Musculoskeletal:  Negative for back pain, falls and neck pain.   Gastrointestinal:  Negative for abdominal pain, change in bowel habit, constipation, diarrhea, dysphagia, heartburn, nausea and vomiting.    Genitourinary:  Negative for hematuria.   Neurological:  Negative for dizziness, headaches, light-headedness and weakness.   Psychiatric/Behavioral:  Negative for altered mental status.    Allergic/Immunologic: Negative for persistent infections.       I have reviewed and confirmed the accuracy of the ROS  LUAN Anderson               Objective:     Vitals and nursing note reviewed.   Constitutional:       General: Not in acute distress.     Appearance: Normal and healthy appearance. Well-developed, normal weight and not in distress. Not diaphoretic.   Eyes:      General: Lids are normal.         Right eye: No discharge.         Left eye: No discharge.      Conjunctiva/sclera: Conjunctivae normal.      Pupils: Pupils are equal, round, and reactive to light.   HENT:      Head: Normocephalic and atraumatic.      Jaw: There is normal jaw occlusion.      Right Ear: External ear normal.      Left Ear: External ear normal.      Nose: Nose normal.   Neck:      Thyroid: No thyromegaly.      Vascular: No carotid bruit, JVD or JVR. JVD normal.      Trachea: Trachea normal. No tracheal deviation.   Pulmonary:      Effort: Pulmonary effort is normal. No respiratory distress.      Breath sounds: No decreased breath sounds. No wheezing. No rhonchi. No rales.   Chest:      Chest wall: Not tender to palpatation.   Cardiovascular:      PMI at left midclavicular line. Normal rate. Regular rhythm. Normal S1. Normal S2.       Murmurs: There is no murmur.      No gallop.  No click. No rub.   Pulses:     Intact distal pulses. No decreased pulses.   Edema:     Peripheral edema absent.   Abdominal:      General: Bowel sounds are normal. There is no distension.      Palpations: Abdomen is soft.      Tenderness: There is no abdominal tenderness.   Musculoskeletal: Normal range of motion.         General: No tenderness or deformity.      Cervical back: Normal range of motion and neck supple. Skin:     General: Skin is warm and dry.  "     Coloration: Skin is not pale.      Findings: No erythema or rash.   Neurological:      General: No focal deficit present.      Mental Status: Alert, oriented to person, place, and time and oriented to person, place and time.   Psychiatric:         Attention and Perception: Attention and perception normal.         Mood and Affect: Mood and affect normal.         Speech: Speech normal.         Behavior: Behavior normal.         Thought Content: Thought content normal.         Cognition and Memory: Cognition and memory normal.         Judgment: Judgment normal.             ECG 12 Lead    Date/Time: 10/19/2023 10:34 AM  Performed by: Jessica Magana APRN    Authorized by: Jessica Magana APRN  Comparison: compared with previous ECG from 4/13/2023  Similar to previous ECG  Rhythm: sinus rhythm  Rate: normal  BPM: 68  Conduction: right bundle branch block and left anterior fascicular block  QRS axis: left    Clinical impression: abnormal EKG        /72   Pulse 68   Ht 165.1 cm (65\")   Wt 56.7 kg (125 lb)   LMP  (LMP Unknown)   SpO2 97%   BMI 20.80 kg/m²   Lab Review:   Lab Results   Component Value Date    WBC 9.18 02/17/2023    HGB 12.1 02/17/2023    HCT 36.2 02/17/2023    MCV 94.8 02/17/2023     02/17/2023     Lab Results   Component Value Date    GLUCOSE 76 08/25/2023    BUN 29 (H) 08/25/2023    CREATININE 0.88 08/25/2023    EGFRIFNONA 57 (L) 11/24/2021    EGFRIFAFRI 69 11/24/2021    BCR 33.0 (H) 08/25/2023    K 4.5 08/25/2023    CO2 25.6 08/25/2023    CALCIUM 9.6 08/25/2023    PROTENTOTREF 6.9 02/17/2023    ALBUMIN 4.6 02/17/2023    LABIL2 2.0 02/17/2023    AST 23 02/17/2023    ALT 16 02/17/2023     Lab Results   Component Value Date    CHLPL 174 08/25/2023    CHLPL 204 (H) 02/17/2023    CHLPL 201 (H) 12/01/2022     Lab Results   Component Value Date    TRIG 30 08/25/2023    TRIG 41 02/17/2023    TRIG 40 12/01/2022     Lab Results   Component Value Date     (H) 08/25/2023    HDL " 149 (H) 02/17/2023     (H) 12/01/2022     Lab Results   Component Value Date    LDL 38 08/25/2023    LDL 47 02/17/2023    LDL 64 12/01/2022       I have reviewed the most recent lab results.       Assessment:          Diagnosis Plan   1. Essential hypertension   blood pressure is trivially elevated in office today. Pt reports consistently lower than 130/80 at home.  Continue lisinopril.  Monitor and record daily blood pressure. Report readings consistently higher than 130/80 or consistently lower than 100/60.    2. LAFB (left anterior fascicular block)  Chronic. Stable.     3. RBBB  Chronic. Stable.      4. AV block, 1st degree Asymptomatic. Not present on EKG today.           Plan:         1. Continue medications as previously prescribed.  2. Report any worsening symptoms.  3. Report any signs of bleeding.  4. Continue heart healthy diet and regular exercise as tolerated.   5. Follow up with PCP for blood pressure and cholesterol management and routine lab work.  6. Follow up in one year, or sooner if needed.   7.  Monitor and record daily blood pressure. Report readings consistently higher than 130/80 or consistently lower than 100/60.

## 2023-10-19 ENCOUNTER — OFFICE VISIT (OUTPATIENT)
Dept: CARDIOLOGY | Facility: CLINIC | Age: 68
End: 2023-10-19
Payer: MEDICARE

## 2023-10-19 VITALS
BODY MASS INDEX: 20.83 KG/M2 | SYSTOLIC BLOOD PRESSURE: 132 MMHG | HEIGHT: 65 IN | OXYGEN SATURATION: 97 % | DIASTOLIC BLOOD PRESSURE: 72 MMHG | HEART RATE: 68 BPM | WEIGHT: 125 LBS

## 2023-10-19 DIAGNOSIS — I44.0 AV BLOCK, 1ST DEGREE: ICD-10-CM

## 2023-10-19 DIAGNOSIS — I10 ESSENTIAL HYPERTENSION: Primary | Chronic | ICD-10-CM

## 2023-10-19 DIAGNOSIS — I44.4 LAFB (LEFT ANTERIOR FASCICULAR BLOCK): Chronic | ICD-10-CM

## 2023-10-19 DIAGNOSIS — I45.10 RBBB: Chronic | ICD-10-CM

## 2023-10-19 PROCEDURE — 3075F SYST BP GE 130 - 139MM HG: CPT | Performed by: NURSE PRACTITIONER

## 2023-10-19 PROCEDURE — 1160F RVW MEDS BY RX/DR IN RCRD: CPT | Performed by: NURSE PRACTITIONER

## 2023-10-19 PROCEDURE — 1159F MED LIST DOCD IN RCRD: CPT | Performed by: NURSE PRACTITIONER

## 2023-10-19 PROCEDURE — 99214 OFFICE O/P EST MOD 30 MIN: CPT | Performed by: NURSE PRACTITIONER

## 2023-10-19 PROCEDURE — 3078F DIAST BP <80 MM HG: CPT | Performed by: NURSE PRACTITIONER

## 2023-10-19 PROCEDURE — 93000 ELECTROCARDIOGRAM COMPLETE: CPT | Performed by: NURSE PRACTITIONER

## 2023-10-19 RX ORDER — ROSUVASTATIN CALCIUM 5 MG/1
5 TABLET, COATED ORAL DAILY
Qty: 90 TABLET | Refills: 3 | Status: SHIPPED | OUTPATIENT
Start: 2023-10-19

## 2023-10-24 DIAGNOSIS — J44.9 COPD, MODERATE: ICD-10-CM

## 2023-10-24 RX ORDER — FLUTICASONE PROPIONATE AND SALMETEROL 250; 50 UG/1; UG/1
1 POWDER RESPIRATORY (INHALATION) 2 TIMES DAILY
Qty: 60 EACH | Refills: 5 | Status: SHIPPED | OUTPATIENT
Start: 2023-10-24

## 2023-10-24 NOTE — TELEPHONE ENCOUNTER
Rx Refill Note  Requested Prescriptions     Pending Prescriptions Disp Refills    Fluticasone-Salmeterol (Wixela Inhub) 250-50 MCG/ACT DISKUS 60 each 5     Sig: Inhale 1 puff 2 (Two) Times a Day.      Last office visit with prescribing clinician: 8/30/2023   Last telemedicine visit with prescribing clinician: Visit date not found   Next office visit with prescribing clinician: 1/24/2024                         Would you like a call back once the refill request has been completed: [] Yes [] No    If the office needs to give you a call back, can they leave a voicemail: [] Yes [] No    Madison Yañez MA  10/24/23, 16:52 CDT    ZAIN out of office until 11/2

## 2023-12-20 LAB
NCCN CRITERIA FLAG: NORMAL
TYRER CUZICK SCORE: 5.6

## 2023-12-26 RX ORDER — LISINOPRIL 20 MG/1
20 TABLET ORAL DAILY
Qty: 90 TABLET | Refills: 3 | Status: SHIPPED | OUTPATIENT
Start: 2023-12-26

## 2023-12-28 ENCOUNTER — HOSPITAL ENCOUNTER (OUTPATIENT)
Dept: MAMMOGRAPHY | Facility: HOSPITAL | Age: 68
Discharge: HOME | End: 2023-12-28
Payer: MEDICARE

## 2023-12-28 ENCOUNTER — HOSPITAL ENCOUNTER (OUTPATIENT)
Dept: BONE DENSITY | Facility: HOSPITAL | Age: 68
Discharge: HOME | End: 2023-12-28
Payer: MEDICARE

## 2023-12-28 DIAGNOSIS — M81.0 AGE-RELATED OSTEOPOROSIS WITHOUT CURRENT PATHOLOGICAL FRACTURE: ICD-10-CM

## 2023-12-28 DIAGNOSIS — Z78.0 POST-MENOPAUSAL: ICD-10-CM

## 2023-12-28 DIAGNOSIS — Z12.31 ENCOUNTER FOR SCREENING MAMMOGRAM FOR MALIGNANT NEOPLASM OF BREAST: ICD-10-CM

## 2023-12-28 PROCEDURE — 77080 DXA BONE DENSITY AXIAL: CPT

## 2023-12-28 PROCEDURE — 77063 BREAST TOMOSYNTHESIS BI: CPT

## 2023-12-28 PROCEDURE — 77067 SCR MAMMO BI INCL CAD: CPT

## 2023-12-28 NOTE — PROGRESS NOTES
Mammography is negative for signs of malignancy, recommendations are to continue with annual screening.

## 2023-12-28 NOTE — PROGRESS NOTES
Bone density scan does show improved bone density, previous T-score was -2.9, now currently at 2.4, bones are still fragile and she is still at increased risk of fracture but risk is slightly reduced, continue with weightbearing exercises, fall precautions, Fosamax, adequate vitamin D and calcium intake.

## 2024-01-24 ENCOUNTER — HOSPITAL ENCOUNTER (OUTPATIENT)
Dept: CT IMAGING | Facility: HOSPITAL | Age: 69
Discharge: HOME | End: 2024-01-24
Payer: MEDICARE

## 2024-01-24 ENCOUNTER — OFFICE VISIT (OUTPATIENT)
Dept: PULMONOLOGY | Facility: CLINIC | Age: 69
End: 2024-01-24
Payer: MEDICARE

## 2024-01-24 ENCOUNTER — HOSPITAL ENCOUNTER (OUTPATIENT)
Dept: CT IMAGING | Facility: HOSPITAL | Age: 69
Discharge: HOME | End: 2024-01-24
Admitting: INTERNAL MEDICINE
Payer: MEDICARE

## 2024-01-24 VITALS
SYSTOLIC BLOOD PRESSURE: 120 MMHG | HEART RATE: 88 BPM | OXYGEN SATURATION: 96 % | WEIGHT: 128.6 LBS | BODY MASS INDEX: 21.43 KG/M2 | HEIGHT: 65 IN | DIASTOLIC BLOOD PRESSURE: 72 MMHG

## 2024-01-24 DIAGNOSIS — J43.2 CENTRILOBULAR EMPHYSEMA: Chronic | ICD-10-CM

## 2024-01-24 DIAGNOSIS — R91.8 LUNG NODULES: Chronic | ICD-10-CM

## 2024-01-24 DIAGNOSIS — J44.9 COPD, MODERATE: Chronic | ICD-10-CM

## 2024-01-24 DIAGNOSIS — Z87.891 PERSONAL HISTORY OF NICOTINE DEPENDENCE: Chronic | ICD-10-CM

## 2024-01-24 DIAGNOSIS — U09.9 POST-COVID-19 CONDITION: Chronic | ICD-10-CM

## 2024-01-24 DIAGNOSIS — R91.8 LUNG NODULES: Primary | Chronic | ICD-10-CM

## 2024-01-24 PROCEDURE — 71250 CT THORAX DX C-: CPT

## 2024-01-24 NOTE — PATIENT INSTRUCTIONS
The patient's official chest CT report is pending.  She does have lung nodules and to my review I do not see any significant change but there could possibly a subtle change particularly in her right upper lobe nodule.  I will call with the official report when available and if there is a question of any increased size of these nodules we could consider a follow-up PET scan and then depending on the PET scan results possibly empiric radiation versus navigational bronchoscopy per Dr. Amanda if any of the nodules did not show significant increased activity.  I did provide her information on the RSV vaccine and I will see her back in 3 months otherwise with PFTs but again I will call with the official CT results when available.

## 2024-01-24 NOTE — PROGRESS NOTES
Chief Complaint  Lung nodules and COPD    Subjective    History of Present Illness {CC  Problem List  Visit Diagnosis   Encounters  Notes  Medications  Labs  Result Review Imaging  Media: 23}    Michelle Wilson presents to [facility] for lung nodules and COPD.    History of Present Illness   The patient CT was reviewed with her.  The official report is not available but I did not see any definite change in lung nodules although I told her I wanted to make sure there was no subtle change in her right upper lobe nodule in particular per the official report.  The patient reports subsidy came back after she left the office and indeed it was felt that the 8 mm nodule on the right was stable and a left lobe nodule in particular may have shown a slight decrease in size.  There were some other smaller stable nodules 3 mm in size.  I did contact her by phone with these results and advised her we will get a follow-up CT in 6 months.  I will be seeing her back otherwise in about 3 months for her pulmonary functions.  She inquired about the RSV vaccine and I did encourage her to have it administered.  I provided her some information on this and told her one of her local pharmacies likely would have it available.  She has had the COVID-19 vaccine in the form of the Moderna vaccine including 2 standard boosters and more recently the spike booster.  She had the flu shot this past fall and has had a Prevnar 13 and Pneumovax in the past as well.  Prior to Admission medications    Medication Sig Start Date End Date Taking? Authorizing Provider   albuterol sulfate  (90 Base) MCG/ACT inhaler Inhale 2 puffs Every 4 (Four) Hours As Needed for Wheezing. 5/6/22  Yes Geo Duggan MD   alendronate (FOSAMAX) 70 MG tablet Take 1 tablet by mouth Every 7 (Seven) Days. 6/1/23  Yes Krissy Cardoso APRN   ALPRAZolam XR (XANAX XR) 0.5 MG 24 hr tablet Take 1 tablet by  mouth Every Morning. 10/16/23  Yes JANICE Smith DO   aspirin 81 MG EC tablet Take 1 tablet by mouth Daily.   Yes ProviderSabrina MD   Azelastine HCl 137 MCG/SPRAY solution 2 sprays into the nostril(s) as directed by provider 2 (Two) Times a Day.  Patient taking differently: 2 sprays into the nostril(s) as directed by provider As Needed. 5/6/22  Yes Geo Duggan MD   Calcium Carbonate (CALCIUM 600 PO) Take 1 tablet by mouth Daily.   Yes Sabrina Garcia MD   etodolac (LODINE) 400 MG tablet Take 1 tablet by mouth 2 (Two) Times a Day. 6/1/23  Yes Krissy Cardoso APRN   Flaxseed, Linseed, (FLAX SEED OIL PO) Take  by mouth.   Yes Sabrina Garcia MD   fluticasone (Flonase Allergy Relief) 50 MCG/ACT nasal spray 2 sprays into the nostril(s) as directed by provider Daily. 5/6/22  Yes Geo Duggan MD   Fluticasone-Salmeterol (Wixela Inhub) 250-50 MCG/ACT DISKUS Inhale 1 puff 2 (Two) Times a Day. 10/24/23  Yes Netta Payton APRN   lisinopril (PRINIVIL,ZESTRIL) 20 MG tablet TAKE ONE TABLET BY MOUTH DAILY 12/26/23  Yes Krissy Cardoso APRN   loratadine (CLARITIN) 10 MG tablet Take 1 tablet by mouth Daily. 5/6/22  Yes Geo Duggan MD   Multiple Vitamins-Minerals (MULTIVITAMIN ADULT PO) Take 1 tablet by mouth Daily.   Yes ProviderSabrina MD   rosuvastatin (CRESTOR) 5 MG tablet Take 1 tablet by mouth Daily. 10/19/23  Yes Jessica Magana APRN   vitamin C (ASCORBIC ACID) 500 MG tablet Take 1 tablet by mouth Daily.   Yes Sabrina Garcia MD   VITAMIN E PO Take 1 capsule by mouth Daily.   Yes Sabrina Garcia MD   Zinc 50 MG tablet Take 1 tablet by mouth Daily.   Yes Sabrina Garcia MD       Social History     Socioeconomic History    Marital status:    Tobacco Use    Smoking status: Former     Packs/day: 1.00     Years: 49.00     Additional pack years: 0.00     Total pack years: 49.00     Types: Cigarettes     Start date: 1973     Quit date:  "2022     Years since quittin.9     Passive exposure: Never    Smokeless tobacco: Never   Vaping Use    Vaping Use: Never used   Substance and Sexual Activity    Alcohol use: Not Currently     Comment: occ    Drug use: Never    Sexual activity: Not Currently       Objective   Vital Signs:   /72   Pulse 88   Ht 165.1 cm (65\")   Wt 58.3 kg (128 lb 9.6 oz)   SpO2 96% Comment: RA  BMI 21.40 kg/m²     Physical Exam  Vitals and nursing note reviewed.   HENT:      Head: Normocephalic.   Eyes:      Extraocular Movements: Extraocular movements intact.      Pupils: Pupils are equal, round, and reactive to light.   Cardiovascular:      Rate and Rhythm: Normal rate and regular rhythm.   Pulmonary:      Effort: Pulmonary effort is normal.      Comments: Breath sounds are diminished but no adventitious sounds are heard.  Musculoskeletal:         General: Normal range of motion.   Skin:     General: Skin is warm and dry.   Neurological:      General: No focal deficit present.      Mental Status: She is alert and oriented to person, place, and time.   Psychiatric:         Mood and Affect: Mood normal.         Behavior: Behavior normal.        Result Review :    PFT Values          5/10/2023    15:30   Pre Drug PFT Results   FVC 99   FEV1 61   FEF 25-75% 24   FEV1/FVC 48   Other Tests PFT Results         DLCO 79   D/VAsb 74         Results for orders placed in visit on 05/10/23    Full Pulmonary Function Test Without Bronchodilator    Narrative  Full Pulmonary Function Test Without Bronchodilator  Performed by: Madison Siddiqui, RRT  Authorized by: Keshawn Martin MD    Pre Drug % Predicted  FVC: 99%  FEV1: 61%  FEF 25-75%: 24%  FEV1/FVC: 48%  T%  RV: 168%  DLCO: 79%  D/VAsb: 74%    Interpretation  Spirometry  Spirometry shows moderate obstruction.  Lung Volume Measurements  Measurements show elevated residual volume consistent with gas trapping.  Diffusion Capacity  The patient's " diffusion capacity is mildly reduced.  Diffusion capacity is mildly reduced when corrected for alveolar volume.  Overall comments: The patient's spirometry is consistent with a moderate obstructive ventilatory defect.  Lung volumes reveal hyperinflation.  There is a mild diffusion impairment even when corrected for alveolar volume.  When current studies are compared to studies performed at [facility] on May 3, 2022, the patient's current baseline spirometry reveals a slight drop in her FVC but some improvement in her FEV1 compared to previous prebronchodilator values.  Her current baseline spirometry reveals a decline in her FVC and a slight decline in her FEV1 compared to previous postbronchodilator values.  Her current lung volumes reveal improvement in the degree of hyperinflation compared to previous studies.      Results for orders placed during the hospital encounter of 05/03/22    Full Pulmonary Function Test With Bronchodilator    Narrative  [facility] - Pulmonary Function Test    [address] [place] [address]  [place]  [address]  [phone]    Patient : Edel Wilson  MRN : [id number]  CSN : [id number]  Pulmonologist : Keshawn Martin MD  Date : 5/3/2022    ______________________________________________________________________    Interpretation :  1.  Spirometry is consistent with a moderate bordering on severe obstructive ventilatory defect.  2.  There is improvement in spirometry postbronchodilator but a moderate obstructive ventilatory defect is still present.  3.  Lung volumes reveal hyperinflation.      Keshawn Martin MD                 My interpretation of imaging:    CT Chest Without Contrast Diagnostic (01/24/2024 12:46)         Assessment and Plan {CC Problem List  Visit Diagnosis  ROS  Review (Popup)  Health Maintenance  Quality  BestPractice  Medications  SmartSets  SnapShot Encounters  Media : 23}    Diagnoses and all orders for this visit:    1. Lung  nodules (Primary)  Assessment & Plan:  These were stable on her chest CT performed earlier today and I will repeat the scan in 6 months.      2. COPD, moderate  Assessment & Plan:  She will continue her Wixela Inhub and as needed albuterol HFA and I will repeat pulmonary functions on her return visit.      Orders:  -     Spirometry with Diffusion Capacity & Lung Volumes; Future    3. Centrilobular emphysema  Assessment & Plan:  This is associated with her COPD and has been noted on prior imaging studies.          4. Post-COVID-19 condition  Assessment & Plan:  It is possible some of the nodular changes on her chest CT are related to her prior COVID-19 infection.  Her chest CT today was stable in terms of right upper lobe nodule with slight improvement in her left upper lobe nodule.      5. Personal history of nicotine dependence  Assessment & Plan:  She quit smoking in late February 2022.            Keshawn Martin MD  1/24/2024  17:20 CST    Follow Up   Return in about 3 months (around 4/24/2024) for To see me specifically, Complete PFT.    Patient was given instructions and counseling regarding her condition or for health maintenance advice. Please see specific information pulled into the AVS if appropriate.

## 2024-01-24 NOTE — ASSESSMENT & PLAN NOTE
It is possible some of the nodular changes on her chest CT are related to her prior COVID-19 infection.  Her chest CT today was stable in terms of right upper lobe nodule with slight improvement in her left upper lobe nodule.

## 2024-02-26 ENCOUNTER — OFFICE VISIT (OUTPATIENT)
Dept: INTERNAL MEDICINE | Facility: CLINIC | Age: 69
End: 2024-02-26
Payer: MEDICARE

## 2024-02-26 VITALS
HEART RATE: 67 BPM | SYSTOLIC BLOOD PRESSURE: 132 MMHG | HEIGHT: 65 IN | BODY MASS INDEX: 21.76 KG/M2 | DIASTOLIC BLOOD PRESSURE: 76 MMHG | WEIGHT: 130.6 LBS | OXYGEN SATURATION: 99 % | TEMPERATURE: 97.6 F

## 2024-02-26 DIAGNOSIS — J43.2 CENTRILOBULAR EMPHYSEMA: Primary | ICD-10-CM

## 2024-02-26 DIAGNOSIS — I10 ESSENTIAL HYPERTENSION: Chronic | ICD-10-CM

## 2024-02-26 DIAGNOSIS — J44.9 COPD, MODERATE: ICD-10-CM

## 2024-02-26 DIAGNOSIS — E78.89 ELEVATED HDL: ICD-10-CM

## 2024-02-26 DIAGNOSIS — F32.A ANXIETY AND DEPRESSION: ICD-10-CM

## 2024-02-26 DIAGNOSIS — I44.0 AV BLOCK, 1ST DEGREE: ICD-10-CM

## 2024-02-26 DIAGNOSIS — R53.83 OTHER FATIGUE: ICD-10-CM

## 2024-02-26 DIAGNOSIS — Z79.899 ENCOUNTER FOR LONG-TERM (CURRENT) USE OF MEDICATIONS: ICD-10-CM

## 2024-02-26 DIAGNOSIS — M81.0 AGE-RELATED OSTEOPOROSIS WITHOUT CURRENT PATHOLOGICAL FRACTURE: ICD-10-CM

## 2024-02-26 DIAGNOSIS — F41.9 ANXIETY AND DEPRESSION: ICD-10-CM

## 2024-02-26 DIAGNOSIS — E55.9 VITAMIN D DEFICIENCY: ICD-10-CM

## 2024-02-26 DIAGNOSIS — C44.719 BASAL CELL CARCINOMA (BCC) OF LEFT LOWER EXTREMITY: ICD-10-CM

## 2024-02-26 DIAGNOSIS — Z00.00 ENCOUNTER FOR SUBSEQUENT ANNUAL WELLNESS VISIT (AWV) IN MEDICARE PATIENT: Primary | ICD-10-CM

## 2024-02-26 LAB
AMPHET+METHAMPHET UR QL: NEGATIVE
AMPHETAMINE INTERNAL CONTROL: NORMAL
AMPHETAMINES UR QL: NEGATIVE
BARBITURATE INTERNAL CONTROL: NORMAL
BARBITURATES UR QL SCN: NEGATIVE
BENZODIAZ UR QL SCN: NEGATIVE
BENZODIAZEPINE INTERNAL CONTROL: NORMAL
BUPRENORPHINE INTERNAL CONTROL: NORMAL
BUPRENORPHINE SERPL-MCNC: NEGATIVE NG/ML
CANNABINOIDS SERPL QL: NEGATIVE
COCAINE INTERNAL CONTROL: NORMAL
COCAINE UR QL: NEGATIVE
EXPIRATION DATE: NORMAL
Lab: NORMAL
MDMA (ECSTASY) INTERNAL CONTROL: NORMAL
MDMA UR QL SCN: NEGATIVE
METHADONE INTERNAL CONTROL: NORMAL
METHADONE UR QL SCN: NEGATIVE
METHAMPHETAMINE INTERNAL CONTROL: NORMAL
MORPHINE INTERNAL CONTROL: NORMAL
MORPHINE/OPIATES SCREEN, URINE: NEGATIVE
OXYCODONE INTERNAL CONTROL: NORMAL
OXYCODONE UR QL SCN: NEGATIVE
PCP UR QL SCN: NEGATIVE
PHENCYCLIDINE INTERNAL CONTROL: NORMAL
PROPOXYPH UR QL SCN: NEGATIVE
PROPOXYPHENE INTERNAL CONTROL: NORMAL
THC INTERNAL CONTROL: NORMAL
TRICYCLIC ANTIDEPRESSANTS INTERNAL CONTROL: NORMAL
TRICYCLICS UR QL SCN: NEGATIVE

## 2024-02-26 PROCEDURE — 1159F MED LIST DOCD IN RCRD: CPT

## 2024-02-26 PROCEDURE — 80305 DRUG TEST PRSMV DIR OPT OBS: CPT

## 2024-02-26 PROCEDURE — G0439 PPPS, SUBSEQ VISIT: HCPCS

## 2024-02-26 PROCEDURE — 3078F DIAST BP <80 MM HG: CPT

## 2024-02-26 PROCEDURE — 1160F RVW MEDS BY RX/DR IN RCRD: CPT

## 2024-02-26 PROCEDURE — 3075F SYST BP GE 130 - 139MM HG: CPT

## 2024-02-26 PROCEDURE — 1170F FXNL STATUS ASSESSED: CPT

## 2024-02-26 NOTE — PROGRESS NOTES
The ABCs of the Annual Wellness Visit  Subsequent Medicare Wellness Visit    Subjective    Michelle Wilson is a 68 y.o. female who presents for a Subsequent Medicare Wellness Visit.    The following portions of the patient's history were reviewed and   updated as appropriate: allergies, current medications, past family history, past medical history, past social history, past surgical history, and problem list.    Compared to one year ago, the patient feels her physical   health is better.    Compared to one year ago, the patient feels her mental   health is the same.    Recent Hospitalizations:  She was not admitted to the hospital during the last year.       Current Medical Providers:  Patient Care Team:  Krissy Cardoso APRN as PCP - General (Internal Medicine)  Giovanni Lino MD as Cardiologist (Cardiology)  Geo Duggan MD as Consulting Physician (Pulmonary Disease)  Keshawn Martin MD as Consulting Physician (Pulmonary Disease)    Outpatient Medications Prior to Visit   Medication Sig Dispense Refill    albuterol sulfate  (90 Base) MCG/ACT inhaler Inhale 2 puffs Every 4 (Four) Hours As Needed for Wheezing. 6.7 g 5    alendronate (FOSAMAX) 70 MG tablet Take 1 tablet by mouth Every 7 (Seven) Days. 12 tablet 3    ALPRAZolam XR (XANAX XR) 0.5 MG 24 hr tablet Take 1 tablet by mouth Every Morning. 30 tablet 5    aspirin 81 MG EC tablet Take 1 tablet by mouth Daily.      Azelastine HCl 137 MCG/SPRAY solution 2 sprays into the nostril(s) as directed by provider 2 (Two) Times a Day. (Patient taking differently: 2 sprays into the nostril(s) as directed by provider As Needed.) 30 mL 11    Calcium Carbonate (CALCIUM 600 PO) Take 1 tablet by mouth Daily.      etodolac (LODINE) 400 MG tablet Take 1 tablet by mouth 2 (Two) Times a Day. 180 tablet 3    Flaxseed, Linseed, (FLAX SEED OIL PO) Take  by mouth.      fluticasone (Flonase Allergy Relief) 50 MCG/ACT nasal spray 2 sprays into the nostril(s) as  directed by provider Daily. 16 g 11    Fluticasone-Salmeterol (Wixela Inhub) 250-50 MCG/ACT DISKUS Inhale 1 puff 2 (Two) Times a Day. 60 each 5    lisinopril (PRINIVIL,ZESTRIL) 20 MG tablet TAKE ONE TABLET BY MOUTH DAILY 90 tablet 3    loratadine (CLARITIN) 10 MG tablet Take 1 tablet by mouth Daily. 90 tablet 3    Multiple Vitamins-Minerals (MULTIVITAMIN ADULT PO) Take 1 tablet by mouth Daily.      rosuvastatin (CRESTOR) 5 MG tablet Take 1 tablet by mouth Daily. 90 tablet 3    vitamin C (ASCORBIC ACID) 500 MG tablet Take 1 tablet by mouth Daily.      VITAMIN E PO Take 1 capsule by mouth Daily.      Zinc 50 MG tablet Take 1 tablet by mouth Daily.       No facility-administered medications prior to visit.       No opioid medication identified on active medication list. I have reviewed chart for other potential  high risk medication/s and harmful drug interactions in the elderly.        Aspirin is on active medication list. Aspirin use is indicated based on review of current medical condition/s. Pros and cons of this therapy have been discussed today. Benefits of this medication outweigh potential harm.  Patient has been encouraged to continue taking this medication.  .      Patient Active Problem List   Diagnosis    Essential hypertension    SOB (shortness of breath) on exertion    RBBB    LAFB (left anterior fascicular block)    Anxiety and depression    Chronic midline low back pain without sciatica    Family history of early CAD    Atherosclerosis neck vessels     Ex-smoker for less than 1 year    COPD, moderate    Non-seasonal allergic rhinitis    Granulomatous lung disease    Centrilobular emphysema    Basal cell carcinoma (BCC) of left lower extremity    Age-related osteoporosis without current pathological fracture    Lung nodules    Personal history of nicotine dependence    Post-COVID-19 condition    AV block, 1st degree     Advance Care Planning  Advance Directive is not on file.  ACP discussion was held  "with the patient during this visit. Patient does not have an advance directive, information provided.     Objective    Vitals:    24 1303   BP: 132/76   BP Location: Left arm   Patient Position: Sitting   Cuff Size: Adult   Pulse: 67   Temp: 97.6 °F (36.4 °C)   TempSrc: Infrared   SpO2: 99%   Weight: 59.2 kg (130 lb 9.6 oz)   Height: 165.1 cm (65\")   PainSc: 0-No pain     Estimated body mass index is 21.73 kg/m² as calculated from the following:    Height as of this encounter: 165.1 cm (65\").    Weight as of this encounter: 59.2 kg (130 lb 9.6 oz).    BMI is within normal parameters. No other follow-up for BMI required.      Does the patient have evidence of cognitive impairment? No          HEALTH RISK ASSESSMENT    Smoking Status:  Social History     Tobacco Use   Smoking Status Former    Packs/day: 1.00    Years: 49.00    Additional pack years: 0.00    Total pack years: 49.00    Types: Cigarettes    Start date:     Quit date: 2022    Years since quittin.9    Passive exposure: Never   Smokeless Tobacco Never   Tobacco Comments    Quit 2 year ago .     Alcohol Consumption:  Social History     Substance and Sexual Activity   Alcohol Use Yes    Comment: occ     Fall Risk Screen:    STEADI Fall Risk Assessment was completed, and patient is at LOW risk for falls.Assessment completed on:2024    Depression Screenin/26/2024     1:06 PM   PHQ-2/PHQ-9 Depression Screening   Little Interest or Pleasure in Doing Things 0-->not at all   Feeling Down, Depressed or Hopeless 0-->not at all   PHQ-9: Brief Depression Severity Measure Score 0       Health Habits and Functional and Cognitive Screenin/26/2024     1:07 PM   Functional & Cognitive Status   Do you have difficulty preparing food and eating? No   Do you have difficulty bathing yourself, getting dressed or grooming yourself? No   Do you have difficulty using the toilet? No   Do you have difficulty moving around from place to " place? No   Do you have trouble with steps or getting out of a bed or a chair? No   Current Diet Well Balanced Diet   Dental Exam Up to date   Eye Exam Up to date   Exercise (times per week) 5 times per week   Current Exercises Include Walking   Do you need help using the phone?  No   Are you deaf or do you have serious difficulty hearing?  No   Do you need help to go to places out of walking distance? No   Do you need help shopping? No   Do you need help preparing meals?  No   Do you need help with housework?  No   Do you need help with laundry? No   Do you need help taking your medications? No   Do you need help managing money? No   Do you ever drive or ride in a car without wearing a seat belt? No   Have you felt unusual stress, anger or loneliness in the last month? No   Who do you live with? Child   If you need help, do you have trouble finding someone available to you? No   Have you been bothered in the last four weeks by sexual problems? No   Do you have difficulty concentrating, remembering or making decisions? No       Age-appropriate Screening Schedule:  Refer to the list below for future screening recommendations based on patient's age, sex and/or medical conditions. Orders for these recommended tests are listed in the plan section. The patient has been provided with a written plan.    Health Maintenance   Topic Date Due    RSV Vaccine - Adults (1 - 1-dose 60+ series) 03/26/2024 (Originally 7/3/2014)    TDAP/TD VACCINES (1 - Tdap) 02/26/2025 (Originally 7/3/1974)    LIPID PANEL  08/25/2024    LUNG CANCER SCREENING  01/24/2025    ANNUAL WELLNESS VISIT  02/26/2025    MAMMOGRAM  12/28/2025    DXA SCAN  12/28/2025    COLORECTAL CANCER SCREENING  11/01/2029    HEPATITIS C SCREENING  Completed    COVID-19 Vaccine  Completed    INFLUENZA VACCINE  Completed    Pneumococcal Vaccine 65+  Completed    ZOSTER VACCINE  Completed                CMS Preventative Services Quick Reference  Risk Factors Identified During  "Encounter  Immunizations Discussed/Encouraged: RSV (Respiratory Syncytial Virus)  Dental Screening Recommended  Vision Screening Recommended  The above risks/problems have been discussed with the patient.  Pertinent information has been shared with the patient in the After Visit Summary.  An After Visit Summary and PPPS were made available to the patient.    Follow Up:   Next Medicare Wellness visit to be scheduled in 1 year.       Additional E&M Note during same encounter follows:  Patient has multiple medical problems which are significant and separately identifiable that require additional work above and beyond the Medicare Wellness Visit.      Chief Complaint  Medicare Wellness-subsequent (Needs labs ), Med Refill (Patient will need a refill on Xanax. ), and Immunizations (Patient would like to discuss the RSV vaccine. )    Subjective                 Objective   Vital Signs:  /76 (BP Location: Left arm, Patient Position: Sitting, Cuff Size: Adult)   Pulse 67   Temp 97.6 °F (36.4 °C) (Infrared)   Ht 165.1 cm (65\")   Wt 59.2 kg (130 lb 9.6 oz)   SpO2 99%   BMI 21.73 kg/m²     Physical Exam  Vitals and nursing note reviewed.   Constitutional:       General: She is not in acute distress.     Appearance: Normal appearance. She is normal weight. She is not ill-appearing, toxic-appearing or diaphoretic.   HENT:      Head: Normocephalic and atraumatic.      Right Ear: Tympanic membrane, ear canal and external ear normal. There is no impacted cerumen.      Left Ear: Tympanic membrane, ear canal and external ear normal. There is no impacted cerumen.      Nose: Nose normal.      Mouth/Throat:      Mouth: Mucous membranes are moist.      Pharynx: Oropharynx is clear. No oropharyngeal exudate or posterior oropharyngeal erythema.   Eyes:      Extraocular Movements: Extraocular movements intact.      Conjunctiva/sclera: Conjunctivae normal.      Pupils: Pupils are equal, round, and reactive to light.   Neck:      " Thyroid: No thyroid mass, thyromegaly or thyroid tenderness.      Vascular: No carotid bruit.   Cardiovascular:      Rate and Rhythm: Normal rate and regular rhythm.      Pulses: Normal pulses.      Heart sounds: Normal heart sounds.   Pulmonary:      Effort: Pulmonary effort is normal.      Breath sounds: Normal breath sounds.   Abdominal:      General: Bowel sounds are normal.      Palpations: Abdomen is soft.   Musculoskeletal:         General: Normal range of motion.      Cervical back: Normal range of motion and neck supple.      Right lower leg: No edema.      Left lower leg: No edema.   Skin:     General: Skin is warm and dry.      Capillary Refill: Capillary refill takes less than 2 seconds.   Neurological:      General: No focal deficit present.      Mental Status: She is alert and oriented to person, place, and time. Mental status is at baseline.   Psychiatric:         Mood and Affect: Mood normal.         Behavior: Behavior normal.         Thought Content: Thought content normal.         Judgment: Judgment normal.                     Assessment and Plan   Diagnoses and all orders for this visit:    1. Encounter for subsequent annual wellness visit (AWV) in Medicare patient (Primary)    2. Essential hypertension  -     Urinalysis With Microscopic - Urine, Clean Catch  -     Comprehensive Metabolic Panel    3. AV block, 1st degree    4. Anxiety and depression  -     POC Medline 14 Panel Urine Drug Screen    5. Basal cell carcinoma (BCC) of left lower extremity    6. Age-related osteoporosis without current pathological fracture  -     Vitamin D,25-Hydroxy    7. COPD, moderate    8. Encounter for long-term (current) use of medications  -     POC Medline 14 Panel Urine Drug Screen    9. Vitamin D deficiency  -     Vitamin D,25-Hydroxy    10. Other fatigue  -     TSH Rfx On Abnormal To Free T4  -     CBC & Differential    11. Elevated HDL  -     Lipid Panel         Plan of care reviewed with Ms. Ashton  She  reports maintaining an active lifestyle, she is working an average of 4 days weekly currently is something to do to stay busy.  She denies any issues of chest pain, headaches, vision changes or activity intolerance, she reports her pulmonary function is at baseline.  She is currently following annually with cardiology and pulmonology.  She has now been 2 years free of tobacco use.  She continues to adhere to a low carbohydrate diet.  She continues to take 0.5 mg of Xanax once daily for management of anxiety/irritability, she states every now and then she will go a day without using it but notices increased irritability especially by afternoon.  She has not noticed any negative side effects related to the use of the Xanax, she has been maintained on this low-dose for quite some time, we are going to continue with current management for now.  PDMP appears appropriate, UDS and CSA updated in office today.  She has not noted any new skin lesions except for slight discoloration on the tip of her left nare, no bothersome symptoms such as odd sensation, pain, has not noted any changes in characteristics since initially noting.  I recommended taking a photo of this abnormality and monitoring for now, she does have history of basal cell.  We discussed the importance of implementing protection by utilization of sunscreen, longsleeve clothing and hat when out in the sun for prolonged periods of time.  If there is continued changes/concern in regards to this recommend letting me know, because of location that would likely refer to Dr. Armenta.  Her hypertension is currently well-managed with current regimen, continue.  She will complete fasting lab work today and we will communicate results as they become available.  She is currently up-to-date on mammography, recommend continuing on cell breast exams in between annual mammography  Up-to-date on DEXA, has known history of osteoporosis, currently on Fosamax, I believe this is  the fifth year for being on a total, did have a break in between taking it, I advised this is the last year I would want her on it as Fosamax is known to reach maximal benefit after 3 to 5 years of use.  We discussed the importance of continuing current calcium, vitamin D supplementation, weightbearing exercises and fall prevention measures.  She did have [place] in 2019, [place], she states she was put on a 5-year recall, this will be this upcoming November, I have set a reminder for myself in September to go ahead and refer to gastroenterology to get this scheduled for her.  We discussed RSV vaccine, recommended to go ahead and get it  Discussed importance of continuing with routine orthodontic and ophthalmology exams  Remember to utilize a seatbelt when in motorized vehicle  Encouraged to return to the office as needed, otherwise we will follow-up in 6 months.      Follow Up   Return in about 6 months (around 8/26/2024) for Recheck.  Patient was given instructions and counseling regarding her condition or for health maintenance advice. Please see specific information pulled into the AVS if appropriate.       Please note that portions of this note were completed with a voice recognition program.     Electronically signed by LUAN Lemus, 02/26/24, 16:13 CST.

## 2024-02-26 NOTE — PROGRESS NOTES
Subjective   Michelle Wilson is a 68 y.o. female.   No chief complaint on file.      History of Present Illness     The following portions of the patient's history were reviewed and updated as appropriate: allergies, current medications, past family history, past medical history, past social history, past surgical history and problem list.    Review of Systems    Objective   Past Medical History:   Diagnosis Date    Anxiety     Arthritis     Atherosclerosis neck vessels  04/13/2022    Bursitis and tendinitis of shoulder region     Left;  w/ small, partial tear to tendon.    Cancer     skin    Depression     HYATT (dyspnea on exertion)     Family history of early CAD     Full dentures     Hypertension     LAFB (left anterior fascicular block)     RBBB       Past Surgical History:   Procedure Laterality Date    BREAST BIOPSY Right     CHOLECYSTECTOMY  1978    SKIN CANCER EXCISION  2019    TUBAL ABDOMINAL LIGATION Bilateral         Current Outpatient Medications:     albuterol sulfate  (90 Base) MCG/ACT inhaler, Inhale 2 puffs Every 4 (Four) Hours As Needed for Wheezing., Disp: 6.7 g, Rfl: 5    alendronate (FOSAMAX) 70 MG tablet, Take 1 tablet by mouth Every 7 (Seven) Days., Disp: 12 tablet, Rfl: 3    ALPRAZolam XR (XANAX XR) 0.5 MG 24 hr tablet, Take 1 tablet by mouth Every Morning., Disp: 30 tablet, Rfl: 5    aspirin 81 MG EC tablet, Take 1 tablet by mouth Daily., Disp: , Rfl:     Azelastine HCl 137 MCG/SPRAY solution, 2 sprays into the nostril(s) as directed by provider 2 (Two) Times a Day. (Patient taking differently: 2 sprays into the nostril(s) as directed by provider As Needed.), Disp: 30 mL, Rfl: 11    Calcium Carbonate (CALCIUM 600 PO), Take 1 tablet by mouth Daily., Disp: , Rfl:     etodolac (LODINE) 400 MG tablet, Take 1 tablet by mouth 2 (Two) Times a Day., Disp: 180 tablet, Rfl: 3    Flaxseed, Linseed, (FLAX SEED OIL PO), Take  by mouth., Disp: , Rfl:     fluticasone (Flonase Allergy Relief) 50  MCG/ACT nasal spray, 2 sprays into the nostril(s) as directed by provider Daily., Disp: 16 g, Rfl: 11    Fluticasone-Salmeterol (Wixela Inhub) 250-50 MCG/ACT DISKUS, Inhale 1 puff 2 (Two) Times a Day., Disp: 60 each, Rfl: 5    lisinopril (PRINIVIL,ZESTRIL) 20 MG tablet, TAKE ONE TABLET BY MOUTH DAILY, Disp: 90 tablet, Rfl: 3    loratadine (CLARITIN) 10 MG tablet, Take 1 tablet by mouth Daily., Disp: 90 tablet, Rfl: 3    Multiple Vitamins-Minerals (MULTIVITAMIN ADULT PO), Take 1 tablet by mouth Daily., Disp: , Rfl:     rosuvastatin (CRESTOR) 5 MG tablet, Take 1 tablet by mouth Daily., Disp: 90 tablet, Rfl: 3    vitamin C (ASCORBIC ACID) 500 MG tablet, Take 1 tablet by mouth Daily., Disp: , Rfl:     VITAMIN E PO, Take 1 capsule by mouth Daily., Disp: , Rfl:     Zinc 50 MG tablet, Take 1 tablet by mouth Daily., Disp: , Rfl:       LMP  (LMP Unknown)      There is no height or weight on file to calculate BMI.  BMI is within normal parameters. No other follow-up for BMI required.       Physical Exam          Assessment & Plan   Diagnoses and all orders for this visit:    1. Encounter for subsequent annual wellness visit (AWV) in Medicare patient (Primary)    2. Essential hypertension    3. AV block, 1st degree    4. Anxiety and depression    5. Basal cell carcinoma (BCC) of left lower extremity    6. Chronic midline low back pain without sciatica    7. Age-related osteoporosis without current pathological fracture    8. COPD, moderate                     Please note that portions of this note were completed with a voice recognition program.     Electronically signed by LUAN Lemus, 02/26/24, 07:28 CST.

## 2024-02-27 LAB
25(OH)D3+25(OH)D2 SERPL-MCNC: 83.6 NG/ML (ref 30–100)
ALBUMIN SERPL-MCNC: 4.6 G/DL (ref 3.5–5.2)
ALBUMIN/GLOB SERPL: 2.3 G/DL
ALP SERPL-CCNC: 53 U/L (ref 39–117)
ALT SERPL-CCNC: 30 U/L (ref 1–33)
APPEARANCE UR: CLEAR
AST SERPL-CCNC: 28 U/L (ref 1–32)
BACTERIA #/AREA URNS HPF: NORMAL /HPF
BASOPHILS # BLD AUTO: 0.06 10*3/MM3 (ref 0–0.2)
BASOPHILS NFR BLD AUTO: 0.9 % (ref 0–1.5)
BILIRUB SERPL-MCNC: 0.6 MG/DL (ref 0–1.2)
BILIRUB UR QL STRIP: NEGATIVE
BUN SERPL-MCNC: 16 MG/DL (ref 8–23)
BUN/CREAT SERPL: 16.2 (ref 7–25)
CALCIUM SERPL-MCNC: 9.9 MG/DL (ref 8.6–10.5)
CASTS URNS MICRO: NORMAL
CHLORIDE SERPL-SCNC: 99 MMOL/L (ref 98–107)
CHOLEST SERPL-MCNC: 201 MG/DL (ref 0–200)
CO2 SERPL-SCNC: 24.9 MMOL/L (ref 22–29)
COLOR UR: YELLOW
CREAT SERPL-MCNC: 0.99 MG/DL (ref 0.57–1)
EGFRCR SERPLBLD CKD-EPI 2021: 61.9 ML/MIN/1.73
EOSINOPHIL # BLD AUTO: 0.17 10*3/MM3 (ref 0–0.4)
EOSINOPHIL NFR BLD AUTO: 2.7 % (ref 0.3–6.2)
EPI CELLS #/AREA URNS HPF: NORMAL /HPF
ERYTHROCYTE [DISTWIDTH] IN BLOOD BY AUTOMATED COUNT: 12.5 % (ref 12.3–15.4)
GLOBULIN SER CALC-MCNC: 2 GM/DL
GLUCOSE SERPL-MCNC: 77 MG/DL (ref 65–99)
GLUCOSE UR QL STRIP: NEGATIVE
HCT VFR BLD AUTO: 39.5 % (ref 34–46.6)
HDLC SERPL-MCNC: 146 MG/DL (ref 40–60)
HGB BLD-MCNC: 13 G/DL (ref 12–15.9)
HGB UR QL STRIP: NEGATIVE
IMM GRANULOCYTES # BLD AUTO: 0.02 10*3/MM3 (ref 0–0.05)
IMM GRANULOCYTES NFR BLD AUTO: 0.3 % (ref 0–0.5)
KETONES UR QL STRIP: NEGATIVE
LDLC SERPL CALC-MCNC: 49 MG/DL (ref 0–100)
LEUKOCYTE ESTERASE UR QL STRIP: ABNORMAL
LYMPHOCYTES # BLD AUTO: 1.97 10*3/MM3 (ref 0.7–3.1)
LYMPHOCYTES NFR BLD AUTO: 30.9 % (ref 19.6–45.3)
MCH RBC QN AUTO: 31 PG (ref 26.6–33)
MCHC RBC AUTO-ENTMCNC: 32.9 G/DL (ref 31.5–35.7)
MCV RBC AUTO: 94 FL (ref 79–97)
MONOCYTES # BLD AUTO: 0.67 10*3/MM3 (ref 0.1–0.9)
MONOCYTES NFR BLD AUTO: 10.5 % (ref 5–12)
NEUTROPHILS # BLD AUTO: 3.48 10*3/MM3 (ref 1.7–7)
NEUTROPHILS NFR BLD AUTO: 54.7 % (ref 42.7–76)
NITRITE UR QL STRIP: NEGATIVE
NRBC BLD AUTO-RTO: 0 /100 WBC (ref 0–0.2)
PH UR STRIP: 6.5 [PH] (ref 5–8)
PLATELET # BLD AUTO: 299 10*3/MM3 (ref 140–450)
POTASSIUM SERPL-SCNC: 5 MMOL/L (ref 3.5–5.2)
PROT SERPL-MCNC: 6.6 G/DL (ref 6–8.5)
PROT UR QL STRIP: NEGATIVE
RBC # BLD AUTO: 4.2 10*6/MM3 (ref 3.77–5.28)
RBC #/AREA URNS HPF: NORMAL /HPF
SODIUM SERPL-SCNC: 137 MMOL/L (ref 136–145)
SP GR UR STRIP: ABNORMAL (ref 1–1.03)
TRIGL SERPL-MCNC: 22 MG/DL (ref 0–150)
TSH SERPL DL<=0.005 MIU/L-ACNC: 0.64 UIU/ML (ref 0.27–4.2)
UROBILINOGEN UR STRIP-MCNC: ABNORMAL MG/DL
VLDLC SERPL CALC-MCNC: 6 MG/DL (ref 5–40)
WBC # BLD AUTO: 6.37 10*3/MM3 (ref 3.4–10.8)
WBC #/AREA URNS HPF: NORMAL /HPF

## 2024-02-27 RX ORDER — ALBUTEROL SULFATE 90 UG/1
2 AEROSOL, METERED RESPIRATORY (INHALATION) EVERY 4 HOURS PRN
Qty: 6.7 G | Refills: 11 | Status: SHIPPED | OUTPATIENT
Start: 2024-02-27

## 2024-02-27 NOTE — PROGRESS NOTES
Overall labs look good/normal.  Thyroid function appears appropriate, vitamin D in normal range, no anemia.  Cholesterol reveals normal triglyceride and LDL count, HDL is too elevated at 146, would recommend cutting back some on fats, incorporating in moderation. Otherwise, keep up the great work!

## 2024-02-27 NOTE — TELEPHONE ENCOUNTER
Pharmacy sent a request for refills on Albuterol HFA. Refill request routed to Dr. Martin.  Rx Refill Note  Requested Prescriptions     Pending Prescriptions Disp Refills    albuterol sulfate  (90 Base) MCG/ACT inhaler 6.7 g 5     Sig: Inhale 2 puffs Every 4 (Four) Hours As Needed for Wheezing.      Last office visit with prescribing clinician: 01/24/2024  Last telemedicine visit with prescribing clinician: Visit date not found   Next office visit with prescribing clinician: 04/24/2024                        Would you like a call back once the refill request has been completed: [] Yes [] No    If the office needs to give you a call back, can they leave a voicemail: [] Yes [] No    Jacques Adkins CMA  02/27/24, 09:04 CST

## 2024-04-12 DIAGNOSIS — F41.9 ANXIETY: ICD-10-CM

## 2024-04-12 RX ORDER — ALPRAZOLAM 0.5 MG/1
0.5 TABLET, EXTENDED RELEASE ORAL EVERY MORNING
Qty: 30 TABLET | Refills: 5 | Status: SHIPPED | OUTPATIENT
Start: 2024-04-12

## 2024-04-12 NOTE — TELEPHONE ENCOUNTER
Caller: Michelle Wilson    Relationship: Self    Best call back number: [phone]    Requested Prescriptions:   Requested Prescriptions     Pending Prescriptions Disp Refills    ALPRAZolam XR (XANAX XR) 0.5 MG 24 hr tablet 30 tablet 5     Sig: Take 1 tablet by mouth Every Morning.        Pharmacy where request should be sent: [facility] [address] . - [phone]  - [phone]     Last office visit with prescribing clinician: 2/26/2024   Last telemedicine visit with prescribing clinician: Visit date not found   Next office visit with prescribing clinician: 8/26/2024     Does the patient have less than a 3 day supply:  [x] Yes  [] No    Would you like a call back once the refill request has been completed: [x] Yes [] No    If the office needs to give you a call back, can they leave a voicemail: [x] Yes [] No    Vanessa Leroy Rep   04/12/24 09:24 CDT

## 2024-04-21 DIAGNOSIS — J44.9 COPD, MODERATE: ICD-10-CM

## 2024-04-22 RX ORDER — FLUTICASONE PROPIONATE AND SALMETEROL 250; 50 UG/1; UG/1
1 POWDER RESPIRATORY (INHALATION) 2 TIMES DAILY
Qty: 60 EACH | Refills: 11 | Status: SHIPPED | OUTPATIENT
Start: 2024-04-22

## 2024-04-23 ENCOUNTER — OFFICE VISIT (OUTPATIENT)
Dept: PULMONOLOGY | Facility: CLINIC | Age: 69
End: 2024-04-23
Payer: MEDICARE

## 2024-04-23 VITALS
HEIGHT: 65 IN | BODY MASS INDEX: 21.76 KG/M2 | DIASTOLIC BLOOD PRESSURE: 66 MMHG | SYSTOLIC BLOOD PRESSURE: 120 MMHG | WEIGHT: 130.6 LBS | HEART RATE: 86 BPM | OXYGEN SATURATION: 97 %

## 2024-04-23 DIAGNOSIS — J44.9 COPD, MODERATE: Primary | Chronic | ICD-10-CM

## 2024-04-23 DIAGNOSIS — Z87.891 PERSONAL HISTORY OF NICOTINE DEPENDENCE: ICD-10-CM

## 2024-04-23 DIAGNOSIS — U09.9 POST-COVID-19 CONDITION: Chronic | ICD-10-CM

## 2024-04-23 DIAGNOSIS — J44.9 COPD, MODERATE: Chronic | ICD-10-CM

## 2024-04-23 DIAGNOSIS — R91.8 LUNG NODULES: Chronic | ICD-10-CM

## 2024-04-23 DIAGNOSIS — J30.89 NON-SEASONAL ALLERGIC RHINITIS, UNSPECIFIED TRIGGER: Chronic | ICD-10-CM

## 2024-04-23 PROCEDURE — 1159F MED LIST DOCD IN RCRD: CPT | Performed by: INTERNAL MEDICINE

## 2024-04-23 PROCEDURE — 3074F SYST BP LT 130 MM HG: CPT | Performed by: INTERNAL MEDICINE

## 2024-04-23 PROCEDURE — 94375 RESPIRATORY FLOW VOLUME LOOP: CPT | Performed by: INTERNAL MEDICINE

## 2024-04-23 PROCEDURE — 1160F RVW MEDS BY RX/DR IN RCRD: CPT | Performed by: INTERNAL MEDICINE

## 2024-04-23 PROCEDURE — 94727 GAS DIL/WSHOT DETER LNG VOL: CPT | Performed by: INTERNAL MEDICINE

## 2024-04-23 PROCEDURE — 99214 OFFICE O/P EST MOD 30 MIN: CPT | Performed by: INTERNAL MEDICINE

## 2024-04-23 PROCEDURE — 3078F DIAST BP <80 MM HG: CPT | Performed by: INTERNAL MEDICINE

## 2024-04-23 PROCEDURE — 94729 DIFFUSING CAPACITY: CPT | Performed by: INTERNAL MEDICINE

## 2024-04-23 RX ORDER — AZELASTINE HYDROCHLORIDE 137 UG/1
2 SPRAY, METERED NASAL 2 TIMES DAILY
Qty: 30 ML | Refills: 11 | Status: SHIPPED | OUTPATIENT
Start: 2024-04-23

## 2024-04-23 RX ORDER — FLUTICASONE PROPIONATE 50 MCG
2 SPRAY, SUSPENSION (ML) NASAL DAILY
Qty: 16 G | Refills: 11 | Status: SHIPPED | OUTPATIENT
Start: 2024-04-23

## 2024-04-23 NOTE — PATIENT INSTRUCTIONS
The patient's spirometry today is virtually unchanged from last year.  Hyperinflation is present and I did discuss [place] valve therapy with her should she have worsening issues in the future from the standpoint of her COPD but clearly that would not be indicated at present.  I did E prescribe refills of azelastine and fluticasone nasal spray and I will see her back in 3 months with a chest CT.

## 2024-04-23 NOTE — PROGRESS NOTES
Chief Complaint  Lung nodules and COPD    Subjective    History of Present Illness {CC  Problem List  Visit Diagnosis   Encounters  Notes  Medications  Labs  Result Review Imaging  Media: 23}    Michelle Wilson presents to [facility]INE for lung nodules and COPD.    History of Present Illness   The patient is having no acute respiratory tract complaints at this time.  She does not complain of any significant problems with shortness of breath on a regular basis.  Pulmonary functions do show a moderate obstructive ventilatory defect with spirometry virtually unchanged compared to studies performed on May 10 of last year.  She does have worsening hyperinflation.  When corrected for alveolar volume there is been a decline in diffusion capacity compared to previous as well.  I did advise her that patients who have COPD with significant hyperinflation with an emphysematous component would be potential candidates for evaluation regarding [place] valve therapy.  I just wanted her to be aware of such potential options if she had worsening symptoms or worsening pulmonary functions in the future but clearly at present her clinical status is stable but I again went over that future possibility with her.  I did E prescribe refills of her generic Astelin and generic fluticasone nasal spray she has a lot of problems with allergies this time of the year.  She will continue her inhaled medications for her COPD and regarding her lung nodule I will see her back in 3 months with a follow-up chest CT.  She has had the COVID-19 vaccine including 2 standard boosters and a Spikevax booster in the form of the Moderna vaccine.  She had the flu shot this past fall and has had a Prevnar 13 and Pneumovax in the past as well.  Prior to Admission medications    Medication Sig Start Date End Date Taking? Authorizing Provider   albuterol sulfate  (90 Base) MCG/ACT inhaler Inhale 2  puffs Every 4 (Four) Hours As Needed for Wheezing. 2/27/24  Yes Keshawn Martin MD   alendronate (FOSAMAX) 70 MG tablet Take 1 tablet by mouth Every 7 (Seven) Days. 6/1/23  Yes Krissy Cardoso APRN   ALPRAZolam XR (XANAX XR) 0.5 MG 24 hr tablet Take 1 tablet by mouth Every Morning. 4/12/24  Yes JANICE Smith DO   aspirin 81 MG EC tablet Take 1 tablet by mouth Daily.   Yes ProviderSabrina MD   Azelastine HCl 137 MCG/SPRAY solution 2 sprays into the nostril(s) as directed by provider 2 (Two) Times a Day. 4/23/24  Yes Keshawn Martin MD   Calcium Carbonate (CALCIUM 600 PO) Take 1 tablet by mouth Daily.   Yes ProviderSabrina MD   etodolac (LODINE) 400 MG tablet Take 1 tablet by mouth 2 (Two) Times a Day. 6/1/23  Yes Krissy Cardoso APRN   Flaxseed, Linseed, (FLAX SEED OIL PO) Take  by mouth.   Yes ProviderSabrina MD   fluticasone (Flonase Allergy Relief) 50 MCG/ACT nasal spray 2 sprays into the nostril(s) as directed by provider Daily. 4/23/24  Yes Keshawn Martin MD   Fluticasone-Salmeterol (Wixela Inhub) 250-50 MCG/ACT DISKUS Inhale 1 puff 2 (Two) Times a Day. Rinse and spit after using. 4/22/24  Yes Keshawn Martin MD   lisinopril (PRINIVIL,ZESTRIL) 20 MG tablet TAKE ONE TABLET BY MOUTH DAILY 12/26/23  Yes Krissy Cardoso APRN   loratadine (CLARITIN) 10 MG tablet Take 1 tablet by mouth Daily. 5/6/22  Yes Geo Duggan MD   Multiple Vitamins-Minerals (MULTIVITAMIN ADULT PO) Take 1 tablet by mouth Daily.   Yes ProviderSabrina MD   rosuvastatin (CRESTOR) 5 MG tablet Take 1 tablet by mouth Daily. 10/19/23  Yes Jessica Magana APRN   vitamin C (ASCORBIC ACID) 500 MG tablet Take 1 tablet by mouth Daily.   Yes ProviderSabrina, MD   VITAMIN E PO Take 1 capsule by mouth Daily.   Yes Provider, Historical, MD   Zinc 50 MG tablet Take 1 tablet by mouth Daily.   Yes Provider, MD Sabrina   Azelastine HCl 137 MCG/SPRAY solution 2 sprays  "into the nostril(s) as directed by provider 2 (Two) Times a Day.  Patient taking differently: 2 sprays into the nostril(s) as directed by provider As Needed. 22 Yes Geo Duggan MD   fluticasone (Flonase Allergy Relief) 50 MCG/ACT nasal spray 2 sprays into the nostril(s) as directed by provider Daily. 22 Yes Geo Duggan MD       Social History     Socioeconomic History    Marital status:    Tobacco Use    Smoking status: Former     Current packs/day: 0.00     Average packs/day: 1 pack/day for 49.2 years (49.2 ttl pk-yrs)     Types: Cigarettes     Start date:      Quit date: 2022     Years since quittin.1     Passive exposure: Never    Smokeless tobacco: Never    Tobacco comments:     Quit 2 year ago .   Vaping Use    Vaping status: Never Used   Substance and Sexual Activity    Alcohol use: Yes     Comment: occ    Drug use: Never    Sexual activity: Not Currently       Objective   Vital Signs:   /66   Pulse 86   Ht 165.1 cm (65\")   Wt 59.2 kg (130 lb 9.6 oz)   SpO2 97% Comment: RA  BMI 21.73 kg/m²     Physical Exam  Vitals and nursing note reviewed.   HENT:      Head: Normocephalic.   Eyes:      Extraocular Movements: Extraocular movements intact.      Pupils: Pupils are equal, round, and reactive to light.   Cardiovascular:      Rate and Rhythm: Normal rate and regular rhythm.   Pulmonary:      Effort: Pulmonary effort is normal.      Comments: Breath sounds are diminished but no adventitious sounds are heard.  Musculoskeletal:         General: Normal range of motion.   Skin:     General: Skin is warm and dry.   Neurological:      General: No focal deficit present.      Mental Status: She is alert and oriented to person, place, and time.   Psychiatric:         Mood and Affect: Mood normal.         Behavior: Behavior normal.        Result Review :    PFT Values          5/10/2023    15:30 2024    15:00   Pre Drug PFT Results   FVC 99 101 "   FEV1 61 61   FEF 25-75% 24 21   FEV1/FVC 48 47   Other Tests PFT Results    167    260   DLCO 79 68   D/VAsb 74 63         Results for orders placed in visit on 24    Spirometry with Diffusion Capacity & Lung Volumes    Narrative  Spirometry with Diffusion Capacity & Lung Volumes    Performed by: Madison Siddiqui, RRT  Authorized by: Keshawn Martin MD  Pre Drug % Predicted  FVC: 101%  FEV1: 61%  FEF 25-75%: 21%  FEV1/FVC: 47%  T%  RV: 260%  DLCO: 68%  D/VAsb: 63%    Interpretation  Spirometry  Spirometry shows moderate obstruction.  Lung Volume Measurements  Measurements show elevated residual volume consistent with gas trapping.  Diffusion Capacity  The patient's diffusion capacity is mildly reduced.  Diffusion capacity is mildly reduced when corrected for alveolar volume.  Overall comments: The patient's spirometry is consistent with a moderate obstructive ventilatory defect.  Lung volumes reveal severe hyperinflation.  There is a mild diffusion impairment particularly when corrected for alveolar volume.  When current studies are compared to studies from May 10, 2023, there is no significant change in the patient's spirometry compared to previous.  The degree of hyperinflation has worsened compared to previous.  When corrected for alveolar volume there has been a decline in diffusion capacity.  To previous as well.      Results for orders placed in visit on 05/10/23    Full Pulmonary Function Test Without Bronchodilator    Narrative  Full Pulmonary Function Test Without Bronchodilator  Performed by: Madison Siddiqui, RRT  Authorized by: Keshawn Martin MD    Pre Drug % Predicted  FVC: 99%  FEV1: 61%  FEF 25-75%: 24%  FEV1/FVC: 48%  T%  RV: 168%  DLCO: 79%  D/VAsb: 74%    Interpretation  Spirometry  Spirometry shows moderate obstruction.  Lung Volume Measurements  Measurements show elevated residual volume consistent with gas trapping.  Diffusion  Capacity  The patient's diffusion capacity is mildly reduced.  Diffusion capacity is mildly reduced when corrected for alveolar volume.  Overall comments: The patient's spirometry is consistent with a moderate obstructive ventilatory defect.  Lung volumes reveal hyperinflation.  There is a mild diffusion impairment even when corrected for alveolar volume.  When current studies are compared to studies performed at [facility] on May 3, 2022, the patient's current baseline spirometry reveals a slight drop in her FVC but some improvement in her FEV1 compared to previous prebronchodilator values.  Her current baseline spirometry reveals a decline in her FVC and a slight decline in her FEV1 compared to previous postbronchodilator values.  Her current lung volumes reveal improvement in the degree of hyperinflation compared to previous studies.      Results for orders placed during the hospital encounter of 05/03/22    Full Pulmonary Function Test With Bronchodilator    Narrative  [facility] - Pulmonary Function Test    [address] [place] [address]  [place]  [address]  [phone]    Patient : Edel Wilson  MRN : [id number]  CSN : [id number]  Pulmonologist : Keshawn Martin MD  Date : 5/3/2022    ______________________________________________________________________    Interpretation :  1.  Spirometry is consistent with a moderate bordering on severe obstructive ventilatory defect.  2.  There is improvement in spirometry postbronchodilator but a moderate obstructive ventilatory defect is still present.  3.  Lung volumes reveal hyperinflation.      Keshawn Martin MD                 My interpretation of imaging:    CT Chest Without Contrast Diagnostic (01/24/2024 12:46)         Assessment and Plan {CC Problem List  Visit Diagnosis  ROS  Review (Popup)  Health Maintenance  Quality  BestPractice  Medications  SmartSets  SnapShot Encounters  Media : 23}    Diagnoses and all orders for this  visit:    1. COPD, moderate (Primary)  Assessment & Plan:  PFTs today show no change in her spirometry compared to previous although the degree of hyperinflation has worsened from previous.  She will continue her Wixela Inhub and as needed albuterol HFA.      2. Lung nodules  Assessment & Plan:  I will obtain a follow-up CT when she returns in about 3 months.    Orders:  -     CT Chest Without Contrast Diagnostic; Future    3. Post-COVID-19 condition  Assessment & Plan:  She does not appear to have any significant sequela of her prior COVID-19 infection.      4. Non-seasonal allergic rhinitis, unspecified trigger  Assessment & Plan:  I did E prescribe refills of her generic Astelin and generic fluticasone nasal sprays.    Orders:  -     Azelastine HCl 137 MCG/SPRAY solution; 2 sprays into the nostril(s) as directed by provider 2 (Two) Times a Day.  Dispense: 30 mL; Refill: 11  -     fluticasone (Flonase Allergy Relief) 50 MCG/ACT nasal spray; 2 sprays into the nostril(s) as directed by provider Daily.  Dispense: 16 g; Refill: 11    5. Personal history of nicotine dependence  Assessment & Plan:  She quit smoking in February 2022.            Keshawn Martin MD  4/23/2024  16:32 CDT    Follow Up   Return in about 3 months (around 7/23/2024) for To see me specifically.    Patient was given instructions and counseling regarding her condition or for health maintenance advice. Please see specific information pulled into the AVS if appropriate.

## 2024-04-23 NOTE — PROCEDURES
Spirometry with Diffusion Capacity & Lung Volumes    Performed by: Madison Siddiqui, RRT  Authorized by: Keshawn Martin MD     Pre Drug % Predicted    FVC: 101%   FEV1: 61%   FEF 25-75%: 21%   FEV1/FVC: 47%   T%   RV: 260%   DLCO: 68%   D/VAsb: 63%    Interpretation   Spirometry   Spirometry shows moderate obstruction.   Lung Volume Measurements  Measurements show elevated residual volume consistent with gas trapping.   Diffusion Capacity  The patient's diffusion capacity is mildly reduced.  Diffusion capacity is mildly reduced when corrected for alveolar volume.   Overall comments: The patient's spirometry is consistent with a moderate obstructive ventilatory defect.  Lung volumes reveal severe hyperinflation.  There is a mild diffusion impairment particularly when corrected for alveolar volume.  When current studies are compared to studies from May 10, 2023, there is no significant change in the patient's spirometry compared to previous.  The degree of hyperinflation has worsened compared to previous.  When corrected for alveolar volume there has been a decline in diffusion capacity.  To previous as well.

## 2024-04-23 NOTE — ASSESSMENT & PLAN NOTE
PFTs today show no change in her spirometry compared to previous although the degree of hyperinflation has worsened from previous.  She will continue her Wixela Inhub and as needed albuterol HFA.
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
## 2024-06-17 RX ORDER — ETODOLAC 400 MG/1
400 TABLET, FILM COATED ORAL 2 TIMES DAILY
Qty: 180 TABLET | Refills: 1 | Status: SHIPPED | OUTPATIENT
Start: 2024-06-17

## 2024-06-25 DIAGNOSIS — M81.0 AGE-RELATED OSTEOPOROSIS WITHOUT CURRENT PATHOLOGICAL FRACTURE: ICD-10-CM

## 2024-06-25 RX ORDER — ALENDRONATE SODIUM 70 MG/1
70 TABLET ORAL
Qty: 12 TABLET | Refills: 3 | Status: SHIPPED | OUTPATIENT
Start: 2024-06-25

## 2024-07-09 ENCOUNTER — TELEPHONE (OUTPATIENT)
Dept: INTERNAL MEDICINE | Facility: CLINIC | Age: 69
End: 2024-07-09

## 2024-07-09 DIAGNOSIS — U07.1 COVID-19 VIRUS INFECTION: Primary | ICD-10-CM

## 2024-07-09 NOTE — TELEPHONE ENCOUNTER
Caller: [place] Michelle VICKIE    Relationship: Self    Best call back number: [phone]     What is the best time to reach you: ANYTIME    Who are you requesting to speak with (clinical staff, provider,  specific staff member): CLINICAL    What was the call regarding: PATIENT TESTED POSITIVE FOR COVID TODAY, 07.09.24. HER CURRENT SYMPTOMS ARE HEAD CONGESTION, SORE THROAT, CHILLS, ETC. SHE IS WONDERING WHAT THE PROTOCOL ON THIS IS. PLEASE ADVISE.     Is it okay if the provider responds through [facility]: YES

## 2024-07-09 NOTE — TELEPHONE ENCOUNTER
I called, her symptoms are primarily more sinus (she is aware how to symptom manage at this time), she has been doing okay from a respiratory standpoint at baseline has chronic lung issues.  I was initially going to go ahead and send in the Paxlovid however is a strong contraindication with her maintenance inhaler, we discussed this further and she will continue with her current medications, no Paxlovid.  She is aware to call with any additional concerns such as worsening symptoms.

## 2024-07-09 NOTE — TELEPHONE ENCOUNTER
Patient states sx started 7/8.   Experiencing nasal and head congestion, slight cough, sore throat, body chills.  Denies muscle aches, fever.     Taking OTC plain Mucinex.

## 2024-07-22 ENCOUNTER — HOSPITAL ENCOUNTER (OUTPATIENT)
Dept: CT IMAGING | Facility: HOSPITAL | Age: 69
Discharge: HOME | End: 2024-07-22
Admitting: INTERNAL MEDICINE
Payer: MEDICARE

## 2024-07-22 DIAGNOSIS — R91.8 LUNG NODULES: Chronic | ICD-10-CM

## 2024-07-22 PROCEDURE — 71250 CT THORAX DX C-: CPT

## 2024-07-24 ENCOUNTER — OFFICE VISIT (OUTPATIENT)
Dept: PULMONOLOGY | Facility: CLINIC | Age: 69
End: 2024-07-24
Payer: MEDICARE

## 2024-07-24 VITALS
HEIGHT: 68 IN | DIASTOLIC BLOOD PRESSURE: 72 MMHG | BODY MASS INDEX: 19.16 KG/M2 | OXYGEN SATURATION: 99 % | WEIGHT: 126.4 LBS | HEART RATE: 72 BPM | SYSTOLIC BLOOD PRESSURE: 114 MMHG

## 2024-07-24 DIAGNOSIS — J43.2 CENTRILOBULAR EMPHYSEMA: Chronic | ICD-10-CM

## 2024-07-24 DIAGNOSIS — Z87.891 PERSONAL HISTORY OF NICOTINE DEPENDENCE: Chronic | ICD-10-CM

## 2024-07-24 DIAGNOSIS — J44.9 COPD, MODERATE: Chronic | ICD-10-CM

## 2024-07-24 DIAGNOSIS — U09.9 POST-COVID-19 CONDITION: Chronic | ICD-10-CM

## 2024-07-24 DIAGNOSIS — R91.8 LUNG NODULES: Primary | Chronic | ICD-10-CM

## 2024-07-24 PROCEDURE — 3074F SYST BP LT 130 MM HG: CPT | Performed by: INTERNAL MEDICINE

## 2024-07-24 PROCEDURE — 1160F RVW MEDS BY RX/DR IN RCRD: CPT | Performed by: INTERNAL MEDICINE

## 2024-07-24 PROCEDURE — 3078F DIAST BP <80 MM HG: CPT | Performed by: INTERNAL MEDICINE

## 2024-07-24 PROCEDURE — 1159F MED LIST DOCD IN RCRD: CPT | Performed by: INTERNAL MEDICINE

## 2024-07-24 PROCEDURE — 99214 OFFICE O/P EST MOD 30 MIN: CPT | Performed by: INTERNAL MEDICINE

## 2024-07-24 NOTE — PROGRESS NOTES
Chief Complaint  COPD and Lung nodules    Subjective    History of Present Illness {CC  Problem List  Visit Diagnosis   Encounters  Notes  Medications  Labs  Result Review Imaging  Media: 23}    Michelle Wilson presents to [facility]ARE MEDICINE for COPD and lung nodules.    History of Present Illness   The patient had a chest CT performed earlier this week and it showed stability of her 2 larger nodules with almost complete resolution of a smaller nodule and I reviewed the scan with her and demonstrated the size of the 2 larger nodules to her in the office lung nodule model.  We will plan on a follow-up scan in 6 months and if it shows continued stability we can then probably go to once yearly scans.  She is in agreement with this plan.  He unfortunately contracted COVID about 2 weeks ago and still has some cough but was not severely ill and mainly had upper respiratory symptoms initially and then some cough.  Again in spite of this her CT did not show any new nodules or acute infiltrates.  She has had the COVID-19 vaccine including 2 standard boosters and a Spikevax booster in the form of the Moderna vaccine.  She had the flu shot this past fall as had a Prevnar 13 and Pneumovax in the past as well.  Prior to Admission medications    Medication Sig Start Date End Date Taking? Authorizing Provider   albuterol sulfate  (90 Base) MCG/ACT inhaler Inhale 2 puffs Every 4 (Four) Hours As Needed for Wheezing. 2/27/24  Yes Keshawn Martin MD   alendronate (FOSAMAX) 70 MG tablet Take 1 tablet by mouth Every 7 (Seven) Days. 6/25/24  Yes Krissy Cardoso APRN   ALPRAZolam XR (XANAX XR) 0.5 MG 24 hr tablet Take 1 tablet by mouth Every Morning. 4/12/24  Yes JANICE Smith,    aspirin 81 MG EC tablet Take 1 tablet by mouth Daily.   Yes Provider, MD Sabrina   Azelastine HCl 137 MCG/SPRAY solution 2 sprays into the nostril(s) as directed by  provider 2 (Two) Times a Day. 24  Yes Keshawn Martin MD   Calcium Carbonate (CALCIUM 600 PO) Take 1 tablet by mouth Daily.   Yes Sabrina Garcia MD   etodolac (LODINE) 400 MG tablet Take 1 tablet by mouth 2 (Two) Times a Day. 24  Yes JANICE Smith,    Flaxseed, Linseed, (FLAX SEED OIL PO) Take  by mouth.   Yes Sabrina Garcia MD   fluticasone (Flonase Allergy Relief) 50 MCG/ACT nasal spray 2 sprays into the nostril(s) as directed by provider Daily. 24  Yes Keshawn Martin MD   Fluticasone-Salmeterol (Wixela Inhub) 250-50 MCG/ACT DISKUS Inhale 1 puff 2 (Two) Times a Day. Rinse and spit after using. 24  Yes Keshawn Martin MD   lisinopril (PRINIVIL,ZESTRIL) 20 MG tablet TAKE ONE TABLET BY MOUTH DAILY 23  Yes Krissy Cardoso APRN   loratadine (CLARITIN) 10 MG tablet Take 1 tablet by mouth Daily. 22  Yes Geo Duggan MD   Multiple Vitamins-Minerals (MULTIVITAMIN ADULT PO) Take 1 tablet by mouth Daily.   Yes Sabrina Garcia MD   mupirocin (BACTROBAN) 2 % ointment Apply 1 Application topically to the appropriate area as directed 3 (Three) Times a Day. 24  Yes Linnea Bolaños APRN   rosuvastatin (CRESTOR) 5 MG tablet Take 1 tablet by mouth Daily. 10/19/23  Yes Jessica Magana APRN   vitamin C (ASCORBIC ACID) 500 MG tablet Take 1 tablet by mouth Daily.   Yes Sabrina Garcia MD   VITAMIN E PO Take 1 capsule by mouth Daily.   Yes Sabrina Garcia MD   Zinc 50 MG tablet Take 1 tablet by mouth Daily.   Yes Sabrina Garcia MD       Social History     Socioeconomic History    Marital status:    Tobacco Use    Smoking status: Former     Current packs/day: 0.00     Average packs/day: 1 pack/day for 49.2 years (49.2 ttl pk-yrs)     Types: Cigarettes     Start date:      Quit date: 2022     Years since quittin.4     Passive exposure: Never    Smokeless tobacco: Never    Tobacco comments:     Quit 2  "year ago .   Vaping Use    Vaping status: Never Used   Substance and Sexual Activity    Alcohol use: Yes     Comment: occ    Drug use: Never    Sexual activity: Not Currently       Objective   Vital Signs:   /72   Pulse 72   Ht 172.7 cm (67.99\")   Wt 57.3 kg (126 lb 6.4 oz)   SpO2 99% Comment: RA  BMI 19.22 kg/m²     Physical Exam  Vitals and nursing note reviewed.   HENT:      Head: Normocephalic.   Eyes:      Extraocular Movements: Extraocular movements intact.      Pupils: Pupils are equal, round, and reactive to light.   Cardiovascular:      Rate and Rhythm: Normal rate and regular rhythm.   Pulmonary:      Effort: Pulmonary effort is normal.      Comments: She has reasonable air movement bilaterally with no adventitious sounds heard.  Musculoskeletal:         General: Normal range of motion.   Skin:     General: Skin is warm and dry.   Neurological:      General: No focal deficit present.      Mental Status: She is alert and oriented to person, place, and time.   Psychiatric:         Mood and Affect: Mood normal.         Behavior: Behavior normal.        Result Review :    PFT Values          5/10/2023    15:30 2024    15:00   Pre Drug PFT Results   FVC 99 101   FEV1 61 61   FEF 25-75% 24 21   FEV1/FVC 48 47   Other Tests PFT Results    167    260   DLCO 79 68   D/VAsb 74 63         Results for orders placed in visit on 24    Spirometry with Diffusion Capacity & Lung Volumes    Narrative  Spirometry with Diffusion Capacity & Lung Volumes    Performed by: Madison Siddiqui, RRT  Authorized by: Keshawn Martin MD  Pre Drug % Predicted  FVC: 101%  FEV1: 61%  FEF 25-75%: 21%  FEV1/FVC: 47%  T%  RV: 260%  DLCO: 68%  D/VAsb: 63%    Interpretation  Spirometry  Spirometry shows moderate obstruction.  Lung Volume Measurements  Measurements show elevated residual volume consistent with gas trapping.  Diffusion Capacity  The patient's diffusion capacity is mildly " reduced.  Diffusion capacity is mildly reduced when corrected for alveolar volume.  Overall comments: The patient's spirometry is consistent with a moderate obstructive ventilatory defect.  Lung volumes reveal severe hyperinflation.  There is a mild diffusion impairment particularly when corrected for alveolar volume.  When current studies are compared to studies from May 10, 2023, there is no significant change in the patient's spirometry compared to previous.  The degree of hyperinflation has worsened compared to previous.  When corrected for alveolar volume there has been a decline in diffusion capacity.  To previous as well.      Results for orders placed in visit on 05/10/23    Full Pulmonary Function Test Without Bronchodilator    Narrative  Full Pulmonary Function Test Without Bronchodilator  Performed by: Madison Siddiqui, RRT  Authorized by: Keshawn Martin MD    Pre Drug % Predicted  FVC: 99%  FEV1: 61%  FEF 25-75%: 24%  FEV1/FVC: 48%  T%  RV: 168%  DLCO: 79%  D/VAsb: 74%    Interpretation  Spirometry  Spirometry shows moderate obstruction.  Lung Volume Measurements  Measurements show elevated residual volume consistent with gas trapping.  Diffusion Capacity  The patient's diffusion capacity is mildly reduced.  Diffusion capacity is mildly reduced when corrected for alveolar volume.  Overall comments: The patient's spirometry is consistent with a moderate obstructive ventilatory defect.  Lung volumes reveal hyperinflation.  There is a mild diffusion impairment even when corrected for alveolar volume.  When current studies are compared to studies performed at [facility] on May 3, 2022, the patient's current baseline spirometry reveals a slight drop in her FVC but some improvement in her FEV1 compared to previous prebronchodilator values.  Her current baseline spirometry reveals a decline in her FVC and a slight decline in her FEV1 compared to previous postbronchodilator  values.  Her current lung volumes reveal improvement in the degree of hyperinflation compared to previous studies.      Results for orders placed during the hospital encounter of 05/03/22    Full Pulmonary Function Test With Bronchodilator    [facility] - Pulmonary Function Test    [address]  [place]  [place]  [address]  [phone]    Patient : Edel Wilson  MRN : [id number]  CSN : [id number]  Pulmonologist : Keshawn Martin MD  Date : 5/3/2022    ______________________________________________________________________    Interpretation :  1.  Spirometry is consistent with a moderate bordering on severe obstructive ventilatory defect.  2.  There is improvement in spirometry postbronchodilator but a moderate obstructive ventilatory defect is still present.  3.  Lung volumes reveal hyperinflation.      Keshawn Martin MD                 My interpretation of imaging:    CT Chest Without Contrast Diagnostic (07/22/2024 12:15)         Assessment and Plan {CC Problem List  Visit Diagnosis  ROS  Review (Popup)  Health Maintenance  Quality  BestPractice  Medications  SmartSets  SnapShot Encounters  Media : 23}    Diagnoses and all orders for this visit:    1. Lung nodules (Primary)  Assessment & Plan:  Again her 2 larger nodules were stable and her smaller nodule had virtually resolved.  I will get a follow-up scan when she returns in about 6 months.    Orders:  -     CT Chest Without Contrast Diagnostic; Future    2. Centrilobular emphysema  Assessment & Plan:  This has been noted on prior imaging studies and is associated with her COPD.      3. COPD, moderate  Assessment & Plan:  She will continue her Wixela Inhub and as needed albuterol HFA.      4. Post-COVID-19 condition  Assessment & Plan:  She contracted COVID again about 2 weeks ago and other than some residual cough and has no significant sequela of her recent infection clinically and her chest CT did not show any  acute abnormalities either.      5. Personal history of nicotine dependence  Assessment & Plan:  She quit smoking in February 2022.            Keshawn Martin MD  7/24/2024  17:21 CDT    Follow Up   Return in about 6 months (around 1/24/2025) for To see me specifically.    Patient was given instructions and counseling regarding her condition or for health maintenance advice. Please see specific information pulled into the AVS if appropriate.

## 2024-07-24 NOTE — ASSESSMENT & PLAN NOTE
She contracted COVID again about 2 weeks ago and other than some residual cough and has no significant sequela of her recent infection clinically and her chest CT did not show any acute abnormalities either.

## 2024-07-24 NOTE — PATIENT INSTRUCTIONS
The patient was diagnosed with COVID on July night and still has some cough but overall had fairly mild symptoms.  Her chest CT done earlier this week showed stability of 2 of her nodules and almost complete resolution of a thyroid nodule.  I reviewed the scan with her and demonstrated the size nodules to her on the office lung nodule model.  I will repeat a scan in 6 months.

## 2024-07-24 NOTE — ASSESSMENT & PLAN NOTE
Again her 2 larger nodules were stable and her smaller nodule had virtually resolved.  I will get a follow-up scan when she returns in about 6 months.

## 2024-08-26 ENCOUNTER — OFFICE VISIT (OUTPATIENT)
Dept: INTERNAL MEDICINE | Facility: CLINIC | Age: 69
End: 2024-08-26
Payer: MEDICARE

## 2024-08-26 VITALS
DIASTOLIC BLOOD PRESSURE: 72 MMHG | BODY MASS INDEX: 19.25 KG/M2 | TEMPERATURE: 97.3 F | WEIGHT: 127 LBS | OXYGEN SATURATION: 100 % | HEART RATE: 51 BPM | SYSTOLIC BLOOD PRESSURE: 126 MMHG | HEIGHT: 68 IN

## 2024-08-26 DIAGNOSIS — J44.9 COPD, MODERATE: ICD-10-CM

## 2024-08-26 DIAGNOSIS — M81.0 AGE-RELATED OSTEOPOROSIS WITHOUT CURRENT PATHOLOGICAL FRACTURE: ICD-10-CM

## 2024-08-26 DIAGNOSIS — I10 ESSENTIAL HYPERTENSION: Primary | Chronic | ICD-10-CM

## 2024-08-26 DIAGNOSIS — Z12.31 ENCOUNTER FOR SCREENING MAMMOGRAM FOR MALIGNANT NEOPLASM OF BREAST: ICD-10-CM

## 2024-08-26 DIAGNOSIS — F41.9 ANXIETY: ICD-10-CM

## 2024-08-26 DIAGNOSIS — E78.89 ELEVATED HDL: ICD-10-CM

## 2024-08-26 PROCEDURE — 1160F RVW MEDS BY RX/DR IN RCRD: CPT

## 2024-08-26 PROCEDURE — 3074F SYST BP LT 130 MM HG: CPT

## 2024-08-26 PROCEDURE — G2211 COMPLEX E/M VISIT ADD ON: HCPCS

## 2024-08-26 PROCEDURE — 99214 OFFICE O/P EST MOD 30 MIN: CPT

## 2024-08-26 PROCEDURE — 3078F DIAST BP <80 MM HG: CPT

## 2024-08-26 PROCEDURE — 1159F MED LIST DOCD IN RCRD: CPT

## 2024-08-26 PROCEDURE — 1126F AMNT PAIN NOTED NONE PRSNT: CPT

## 2024-08-26 NOTE — PROGRESS NOTES
Subjective   Michelle Wilson is a 69 y.o. female.   Chief Complaint   Patient presents with    Hypertension     6 month f/u;  Denies chest pains and SOB.         Michelle presents to the office today for a routine 6-month follow-up on management of her hypertension and anxiety as well as hyperlipidemia.  She reports she has been feeling well, she continues to work part-time and stays active otherwise working in the yard maintaining the pool.  She reports from a respiratory standpoint she has been doing well without any acute exacerbations of her symptoms.  She is continue to follow closely with pulmonology.  She is continue to overall adhere to a low-carb high-protein diet.  She is denying occurrences of chest pain or activity intolerance  She reports that Xanax continues to be very beneficial for management of her anxiety, she has not noted any negative side effects related to the use of this medication, reports her mood is currently stable.    The following portions of the patient's history were reviewed and updated as appropriate: allergies, current medications, past family history, past medical history, past social history, past surgical history and problem list.    Review of Systems    Objective   Past Medical History:   Diagnosis Date    Anxiety     Arthritis     Atherosclerosis neck vessels  04/13/2022    Bursitis and tendinitis of shoulder region     Left;  w/ small, partial tear to tendon.    Cancer     skin    Depression     HYATT (dyspnea on exertion)     Family history of early CAD     Full dentures     Hypertension     LAFB (left anterior fascicular block)     RBBB       Past Surgical History:   Procedure Laterality Date    BREAST BIOPSY Right     CHOLECYSTECTOMY  1978    SKIN CANCER EXCISION  2019    TUBAL ABDOMINAL LIGATION Bilateral         Current Outpatient Medications:     albuterol sulfate  (90 Base) MCG/ACT inhaler, Inhale 2 puffs Every 4 (Four) Hours As Needed for Wheezing., Disp: 6.7 g,  Rfl: 11    alendronate (FOSAMAX) 70 MG tablet, Take 1 tablet by mouth Every 7 (Seven) Days., Disp: 12 tablet, Rfl: 3    ALPRAZolam XR (XANAX XR) 0.5 MG 24 hr tablet, Take 1 tablet by mouth Every Morning., Disp: 30 tablet, Rfl: 5    aspirin 81 MG EC tablet, Take 1 tablet by mouth Daily., Disp: , Rfl:     Azelastine HCl 137 MCG/SPRAY solution, 2 sprays into the nostril(s) as directed by provider 2 (Two) Times a Day., Disp: 30 mL, Rfl: 11    Calcium Carbonate (CALCIUM 600 PO), Take 1 tablet by mouth Daily., Disp: , Rfl:     etodolac (LODINE) 400 MG tablet, Take 1 tablet by mouth 2 (Two) Times a Day., Disp: 180 tablet, Rfl: 1    Flaxseed, Linseed, (FLAX SEED OIL PO), Take  by mouth., Disp: , Rfl:     fluticasone (Flonase Allergy Relief) 50 MCG/ACT nasal spray, 2 sprays into the nostril(s) as directed by provider Daily., Disp: 16 g, Rfl: 11    Fluticasone-Salmeterol (Wixela Inhub) 250-50 MCG/ACT DISKUS, Inhale 1 puff 2 (Two) Times a Day. Rinse and spit after using., Disp: 60 each, Rfl: 11    lisinopril (PRINIVIL,ZESTRIL) 20 MG tablet, TAKE ONE TABLET BY MOUTH DAILY, Disp: 90 tablet, Rfl: 3    loratadine (CLARITIN) 10 MG tablet, Take 1 tablet by mouth Daily., Disp: 90 tablet, Rfl: 3    Multiple Vitamins-Minerals (MULTIVITAMIN ADULT PO), Take 1 tablet by mouth Daily., Disp: , Rfl:     rosuvastatin (CRESTOR) 5 MG tablet, Take 1 tablet by mouth Daily., Disp: 90 tablet, Rfl: 3    vitamin C (ASCORBIC ACID) 500 MG tablet, Take 1 tablet by mouth Daily., Disp: , Rfl:     VITAMIN E PO, Take 1 capsule by mouth Daily., Disp: , Rfl:     Zinc 50 MG tablet, Take 1 tablet by mouth Daily., Disp: , Rfl:     mupirocin (BACTROBAN) 2 % ointment, Apply 1 Application topically to the appropriate area as directed 3 (Three) Times a Day. (Patient not taking: Reported on 8/26/2024), Disp: 22 g, Rfl: 0      /72 (BP Location: Left arm, Patient Position: Sitting, Cuff Size: Adult)   Pulse 51   Temp 97.3 °F (36.3 °C) (Infrared)   Ht 172.7 cm  "(67.99\")   Wt 57.6 kg (127 lb)   LMP  (LMP Unknown)   SpO2 100%   BMI 19.32 kg/m²      Body mass index is 19.32 kg/m².  BMI is within normal parameters. No other follow-up for BMI required.       Physical Exam  Vitals and nursing note reviewed.   Constitutional:       General: She is not in acute distress.     Appearance: Normal appearance. She is normal weight. She is not ill-appearing, toxic-appearing or diaphoretic.   HENT:      Head: Normocephalic and atraumatic.   Eyes:      Extraocular Movements: Extraocular movements intact.      Conjunctiva/sclera: Conjunctivae normal.      Pupils: Pupils are equal, round, and reactive to light.   Cardiovascular:      Rate and Rhythm: Normal rate and regular rhythm.      Pulses: Normal pulses.      Heart sounds: Normal heart sounds.   Pulmonary:      Effort: Pulmonary effort is normal.      Breath sounds: Normal breath sounds.   Abdominal:      General: Bowel sounds are normal.      Palpations: Abdomen is soft.   Musculoskeletal:         General: No tenderness. Normal range of motion.      Cervical back: Normal range of motion.      Right lower leg: No edema.      Left lower leg: No edema.   Skin:     General: Skin is warm and dry.   Neurological:      General: No focal deficit present.      Mental Status: She is alert and oriented to person, place, and time. Mental status is at baseline.      Gait: Gait normal.   Psychiatric:         Mood and Affect: Mood normal.         Behavior: Behavior normal.         Thought Content: Thought content normal.         Judgment: Judgment normal.               Assessment & Plan   Diagnoses and all orders for this visit:    1. Essential hypertension (Primary)  -     Basic metabolic panel    2. Age-related osteoporosis without current pathological fracture    3. COPD, moderate    4. Elevated HDL  -     Lipid panel    5. Anxiety    6. Encounter for screening mammogram for malignant neoplasm of breast  -     Mammo Screening Digital " Tomosynthesis Bilateral With CAD; Future               Plan of care reviewed with Michelle  Blood pressure is normotensive today 126/72, will continue with lisinopril, continue with lifestyle management as well which has included active lifestyle along with healthier diet.  Ensure appropriate hydration, at least 64 ounces of water daily.  Per her report her anxiety is appropriately managed at this time with use of Xanax 0.5 mg extended release daily, she does utilize this pretty much every morning, she has not noted any negative side effects report her mood is stable currently would like to continue with current measures.  Will continue with current treatment regimen for this.  She is currently up-to-date on her UDS, CSA, her PDMP appears appropriate.  Continue with fall precaution measures, adequate vitamin D and calcium supplementation, and weightbearing exercises in addition to the Fosamax for management of osteoporosis.  Is not yet due for repeat DEXA.  COPD currently stable, she will continue to follow with pulmonology as she has been.  Discussed soon upcoming need for repeat mammography, continue with self breast exams in the interim and report abnormalities.  We discussed getting RSV vaccine at her convenience at her pharmacy or other pharmacy that supplies this vaccine, discussed getting updated COVID-19 vaccine and influenza vaccine in October/early November.  Advised to return to the office as needed for acute issues/concerns, otherwise anticipate returning in 6 months for annual Medicare wellness visit.    Please note that portions of this note were completed with a voice recognition program.     Electronically signed by LUAN Lemus, 08/26/24, 13:13 CDT.

## 2024-08-27 LAB
BUN SERPL-MCNC: 26 MG/DL (ref 8–23)
BUN/CREAT SERPL: 27.1 (ref 7–25)
CALCIUM SERPL-MCNC: 9.5 MG/DL (ref 8.6–10.5)
CHLORIDE SERPL-SCNC: 100 MMOL/L (ref 98–107)
CHOLEST SERPL-MCNC: 177 MG/DL (ref 0–200)
CO2 SERPL-SCNC: 26.1 MMOL/L (ref 22–29)
CREAT SERPL-MCNC: 0.96 MG/DL (ref 0.57–1)
EGFRCR SERPLBLD CKD-EPI 2021: 63.8 ML/MIN/1.73
GLUCOSE SERPL-MCNC: 88 MG/DL (ref 65–99)
HDLC SERPL-MCNC: 119 MG/DL (ref 40–60)
LDLC SERPL CALC-MCNC: 49 MG/DL (ref 0–100)
POTASSIUM SERPL-SCNC: 4.9 MMOL/L (ref 3.5–5.2)
SODIUM SERPL-SCNC: 137 MMOL/L (ref 136–145)
TRIGL SERPL-MCNC: 39 MG/DL (ref 0–150)
VLDLC SERPL CALC-MCNC: 9 MG/DL (ref 5–40)

## 2024-08-27 NOTE — PROGRESS NOTES
Cholesterol looks good, improved from previous evaluation, keep up the great work  Renal function is stable as are electrolytes.

## 2024-09-27 DIAGNOSIS — Z12.11 SCREENING FOR COLON CANCER: Primary | ICD-10-CM

## 2024-10-07 DIAGNOSIS — F41.9 ANXIETY: ICD-10-CM

## 2024-10-07 RX ORDER — ALPRAZOLAM 0.5 MG/1
0.5 TABLET, EXTENDED RELEASE ORAL EVERY MORNING
Qty: 30 TABLET | Refills: 5 | Status: SHIPPED | OUTPATIENT
Start: 2024-10-09

## 2024-10-17 NOTE — PROGRESS NOTES
Subjective:     Encounter Date: 10/21/2024      Patient ID: Michelle Wilson is a 69 y.o. female     HPI: This patient presents today for routine follow-up. She has hypertension, left anterior fascicular block, right bundle branch block, anxiety, depression, skin cancer and former tobacco use. She reports improvement in shortness of breath since smoking cessation. Patient denies chest pain, palpitations, dizziness, syncope, orthopnea, PND, edema or decreased stamina.  Patient denies any signs of bleeding.    Chief Complaint: Routine follow-up  Hypertension  This is a chronic problem. The current episode started more than 1 year ago. The problem is controlled. Pertinent negatives include no anxiety, blurred vision, chest pain, headaches, malaise/fatigue, neck pain, orthopnea, palpitations, peripheral edema, PND, shortness of breath or sweats. Risk factors for coronary artery disease include post-menopausal state. Current antihypertension treatment includes ACE inhibitors. The current treatment provides significant improvement.     I have reviewed and confirmed the accuracy of the HPI LUAN Anderson        Previous Cardiac Testing:  Results for orders placed during the hospital encounter of 09/25/20    Adult Transthoracic Echo Complete W/ Cont if Necessary Per Protocol    Interpretation Summary  · Left ventricular ejection fraction appears to be 61 - 65%. Left ventricular systolic function is normal.  · Left ventricular diastolic function was normal  · Estimated right ventricular systolic pressure from tricuspid regurgitation is normal (<35 mmHg).        The following portions of the patient's history were reviewed and updated as appropriate: allergies, current medications, past family history, past medical history, past social history, past surgical history and problem list.    No Known Allergies    Current Outpatient Medications:     albuterol sulfate  (90 Base) MCG/ACT inhaler, Inhale 2 puffs Every  4 (Four) Hours As Needed for Wheezing., Disp: 6.7 g, Rfl: 11    alendronate (FOSAMAX) 70 MG tablet, Take 1 tablet by mouth Every 7 (Seven) Days., Disp: 12 tablet, Rfl: 3    ALPRAZolam XR (XANAX XR) 0.5 MG 24 hr tablet, Take 1 tablet by mouth Every Morning., Disp: 30 tablet, Rfl: 5    aspirin 81 MG EC tablet, Take 1 tablet by mouth Daily., Disp: , Rfl:     Azelastine HCl 137 MCG/SPRAY solution, 2 sprays into the nostril(s) as directed by provider 2 (Two) Times a Day., Disp: 30 mL, Rfl: 11    Calcium Carbonate (CALCIUM 600 PO), Take 1 tablet by mouth Daily., Disp: , Rfl:     etodolac (LODINE) 400 MG tablet, Take 1 tablet by mouth 2 (Two) Times a Day., Disp: 180 tablet, Rfl: 1    Flaxseed, Linseed, (FLAX SEED OIL PO), Take  by mouth., Disp: , Rfl:     fluticasone (Flonase Allergy Relief) 50 MCG/ACT nasal spray, 2 sprays into the nostril(s) as directed by provider Daily., Disp: 16 g, Rfl: 11    Fluticasone-Salmeterol (Wixela Inhub) 250-50 MCG/ACT DISKUS, Inhale 1 puff 2 (Two) Times a Day. Rinse and spit after using., Disp: 60 each, Rfl: 11    lisinopril (PRINIVIL,ZESTRIL) 20 MG tablet, TAKE ONE TABLET BY MOUTH DAILY, Disp: 90 tablet, Rfl: 3    loratadine (CLARITIN) 10 MG tablet, Take 1 tablet by mouth Daily., Disp: 90 tablet, Rfl: 3    Multiple Vitamins-Minerals (MULTIVITAMIN ADULT PO), Take 1 tablet by mouth Daily., Disp: , Rfl:     mupirocin (BACTROBAN) 2 % ointment, Apply 1 Application topically to the appropriate area as directed 3 (Three) Times a Day., Disp: 22 g, Rfl: 0    rosuvastatin (CRESTOR) 5 MG tablet, Take 1 tablet by mouth Daily., Disp: 90 tablet, Rfl: 3    vitamin C (ASCORBIC ACID) 500 MG tablet, Take 1 tablet by mouth Daily., Disp: , Rfl:     VITAMIN E PO, Take 1 capsule by mouth Daily., Disp: , Rfl:     Zinc 50 MG tablet, Take 1 tablet by mouth Daily., Disp: , Rfl:   Past Medical History:   Diagnosis Date    Anxiety     Arthritis     Atherosclerosis neck vessels  04/13/2022    Bursitis and tendinitis of  shoulder region     Left;  w/ small, partial tear to tendon.    Cancer     skin    Depression     HYATT (dyspnea on exertion)     Family history of early CAD     Full dentures     Hypertension     LAFB (left anterior fascicular block)     RBBB      Past Surgical History:   Procedure Laterality Date    BREAST BIOPSY Right     CHOLECYSTECTOMY      SKIN CANCER EXCISION  2019    TUBAL ABDOMINAL LIGATION Bilateral      Family History   Problem Relation Age of Onset    Arthritis Mother     Hypertension Mother     Depression Mother     Cancer Maternal Grandmother     Arthritis Paternal Grandmother     Cancer Paternal Grandmother     Breast cancer Paternal Grandmother     Cancer Other      Social History     Socioeconomic History    Marital status:    Tobacco Use    Smoking status: Former     Current packs/day: 0.00     Average packs/day: 1 pack/day for 49.2 years (49.2 ttl pk-yrs)     Types: Cigarettes     Start date:      Quit date: 2022     Years since quittin.6     Passive exposure: Never    Smokeless tobacco: Never    Tobacco comments:     Quit 2 year ago .   Vaping Use    Vaping status: Never Used   Substance and Sexual Activity    Alcohol use: Yes     Comment: occ    Drug use: Never    Sexual activity: Not Currently       Review of Systems   Constitutional: Negative for chills, decreased appetite, fever, malaise/fatigue, night sweats, weight gain and weight loss.   HENT:  Negative for nosebleeds.    Eyes:  Negative for blurred vision and visual disturbance.   Cardiovascular:  Negative for chest pain, dyspnea on exertion, leg swelling, near-syncope, orthopnea, palpitations, paroxysmal nocturnal dyspnea and syncope.   Respiratory:  Negative for cough, hemoptysis, shortness of breath, snoring and wheezing.    Endocrine: Negative for cold intolerance and heat intolerance.   Hematologic/Lymphatic: Does not bruise/bleed easily.   Skin:  Negative for rash.   Musculoskeletal:  Negative for back  pain, falls and neck pain.   Gastrointestinal:  Negative for abdominal pain, change in bowel habit, constipation, diarrhea, dysphagia, heartburn, nausea and vomiting.   Genitourinary:  Negative for hematuria.   Neurological:  Negative for dizziness, headaches, light-headedness and weakness.   Psychiatric/Behavioral:  Negative for altered mental status.    Allergic/Immunologic: Negative for persistent infections.       I have reviewed and confirmed the accuracy of the ROS LUAN Anderson                 Objective:     Vitals and nursing note reviewed.   Constitutional:       General: Not in acute distress.     Appearance: Normal and healthy appearance. Well-developed, normal weight and not in distress. Not diaphoretic.   Eyes:      General: Lids are normal.         Right eye: No discharge.         Left eye: No discharge.      Conjunctiva/sclera: Conjunctivae normal.      Pupils: Pupils are equal, round, and reactive to light.   HENT:      Head: Normocephalic and atraumatic.      Jaw: There is normal jaw occlusion.      Right Ear: External ear normal.      Left Ear: External ear normal.      Nose: Nose normal.   Neck:      Thyroid: No thyromegaly.      Vascular: No carotid bruit, JVD or JVR. JVD normal.      Trachea: Trachea normal. No tracheal deviation.   Pulmonary:      Effort: Pulmonary effort is normal. No respiratory distress.      Breath sounds: No decreased breath sounds. No wheezing. No rhonchi. No rales.   Chest:      Chest wall: Not tender to palpatation.   Cardiovascular:      PMI at left midclavicular line. Normal rate. Regular rhythm. Normal S1. Normal S2.       Murmurs: There is no murmur.      No gallop.  No click. No rub.   Pulses:     Intact distal pulses. No decreased pulses.   Edema:     Peripheral edema absent.   Abdominal:      General: Bowel sounds are normal. There is no distension.      Palpations: Abdomen is soft.      Tenderness: There is no abdominal tenderness.   Musculoskeletal:  "Normal range of motion.         General: No tenderness or deformity.      Cervical back: Normal range of motion and neck supple. Skin:     General: Skin is warm and dry.      Coloration: Skin is not pale.      Findings: No erythema or rash.   Neurological:      General: No focal deficit present.      Mental Status: Alert, oriented to person, place, and time and oriented to person, place and time.   Psychiatric:         Attention and Perception: Attention and perception normal.         Mood and Affect: Mood and affect normal.         Speech: Speech normal.         Behavior: Behavior normal.         Thought Content: Thought content normal.         Cognition and Memory: Cognition and memory normal.         Judgment: Judgment normal.             ECG 12 Lead    Date/Time: 10/21/2024 10:42 AM  Performed by: Jessica Magana APRN    Authorized by: Jessica Magana APRN  Comparison: compared with previous ECG from 10/19/2024  Similar to previous ECG  Rhythm: sinus rhythm  Rate: normal  Conduction: right bundle branch block and left anterior fascicular block  T inversion: aVL  QRS axis: left    Clinical impression: abnormal EKG        /82   Ht 172.7 cm (68\")   Wt 56.7 kg (125 lb)   LMP  (LMP Unknown)   SpO2 98%   BMI 19.01 kg/m²   Lab Review:   Lab Results   Component Value Date    WBC 6.37 02/26/2024    HGB 13.0 02/26/2024    HCT 39.5 02/26/2024    MCV 94.0 02/26/2024     02/26/2024     Lab Results   Component Value Date    GLUCOSE 88 08/26/2024    BUN 26 (H) 08/26/2024    CREATININE 0.96 08/26/2024    EGFRIFNONA 57 (L) 11/24/2021    EGFRIFAFRI 69 11/24/2021    BCR 27.1 (H) 08/26/2024    K 4.9 08/26/2024    CO2 26.1 08/26/2024    CALCIUM 9.5 08/26/2024    PROTENTOTREF 6.6 02/26/2024    ALBUMIN 4.6 02/26/2024    LABIL2 2.3 02/26/2024    AST 28 02/26/2024    ALT 30 02/26/2024     Lab Results   Component Value Date    CHLPL 177 08/26/2024    CHLPL 201 (H) 02/26/2024    CHLPL 174 08/25/2023     Lab " Results   Component Value Date    TRIG 39 08/26/2024    TRIG 22 02/26/2024    TRIG 30 08/25/2023     Lab Results   Component Value Date     (H) 08/26/2024     (H) 02/26/2024     (H) 08/25/2023     Lab Results   Component Value Date    LDL 49 08/26/2024    LDL 49 02/26/2024    LDL 38 08/25/2023       I have reviewed the most recent lab results.       Assessment:          Diagnosis Plan   1. Essential hypertension   blood pressure is elevated in office today. Pt reports that she had a fall last week resulting in rib injuries that have caused increased pain. She states that prior to this event, her blood pressure had been well controlled. Continue lisinopril for now.  Monitor and record daily blood pressure. Report readings consistently higher than 130/80 or consistently lower than 100/60.    2. LAFB (left anterior fascicular block)  Chronic. Stable.     3. RBBB  Chronic. Stable.      4. AV block, 1st degree Asymptomatic. Not present on EKG today.           Plan:         1. Continue medications as previously prescribed.  2. Report any worsening symptoms.  3. Report any signs of bleeding.  4. Continue heart healthy diet and regular exercise as tolerated.   5. Follow up with PCP for blood pressure and cholesterol management and routine lab work.  6. Follow up in one year, or sooner if needed.   7.  Monitor and record daily blood pressure. Report readings consistently higher than 130/80 or consistently lower than 100/60.

## 2024-10-21 ENCOUNTER — OFFICE VISIT (OUTPATIENT)
Dept: CARDIOLOGY | Facility: CLINIC | Age: 69
End: 2024-10-21
Payer: MEDICARE

## 2024-10-21 VITALS
DIASTOLIC BLOOD PRESSURE: 82 MMHG | BODY MASS INDEX: 18.94 KG/M2 | WEIGHT: 125 LBS | OXYGEN SATURATION: 98 % | SYSTOLIC BLOOD PRESSURE: 146 MMHG | HEIGHT: 68 IN

## 2024-10-21 DIAGNOSIS — I10 ESSENTIAL HYPERTENSION: Primary | Chronic | ICD-10-CM

## 2024-10-21 DIAGNOSIS — I44.0 AV BLOCK, 1ST DEGREE: ICD-10-CM

## 2024-10-21 DIAGNOSIS — I45.10 RBBB: Chronic | ICD-10-CM

## 2024-10-21 DIAGNOSIS — I44.4 LAFB (LEFT ANTERIOR FASCICULAR BLOCK): Chronic | ICD-10-CM

## 2024-10-21 PROCEDURE — 99214 OFFICE O/P EST MOD 30 MIN: CPT | Performed by: NURSE PRACTITIONER

## 2024-10-21 PROCEDURE — 93000 ELECTROCARDIOGRAM COMPLETE: CPT | Performed by: NURSE PRACTITIONER

## 2024-10-21 PROCEDURE — 3077F SYST BP >= 140 MM HG: CPT | Performed by: NURSE PRACTITIONER

## 2024-10-21 PROCEDURE — 3079F DIAST BP 80-89 MM HG: CPT | Performed by: NURSE PRACTITIONER

## 2024-10-21 PROCEDURE — 1159F MED LIST DOCD IN RCRD: CPT | Performed by: NURSE PRACTITIONER

## 2024-10-21 PROCEDURE — 1160F RVW MEDS BY RX/DR IN RCRD: CPT | Performed by: NURSE PRACTITIONER

## 2024-11-06 ENCOUNTER — OFFICE VISIT (OUTPATIENT)
Dept: GASTROENTEROLOGY | Facility: CLINIC | Age: 69
End: 2024-11-06
Payer: MEDICARE

## 2024-11-06 VITALS
SYSTOLIC BLOOD PRESSURE: 110 MMHG | WEIGHT: 127.4 LBS | OXYGEN SATURATION: 100 % | TEMPERATURE: 97.1 F | HEIGHT: 68 IN | BODY MASS INDEX: 19.31 KG/M2 | DIASTOLIC BLOOD PRESSURE: 80 MMHG | HEART RATE: 80 BPM

## 2024-11-06 DIAGNOSIS — I10 HTN (HYPERTENSION), BENIGN: ICD-10-CM

## 2024-11-06 DIAGNOSIS — R19.5 POSITIVE COLORECTAL CANCER SCREENING USING COLOGUARD TEST: ICD-10-CM

## 2024-11-06 DIAGNOSIS — Z86.0101 HX OF ADENOMATOUS COLONIC POLYPS: Primary | ICD-10-CM

## 2024-11-06 RX ORDER — SODIUM, POTASSIUM,MAG SULFATES 17.5-3.13G
SOLUTION, RECONSTITUTED, ORAL ORAL
Qty: 177 ML | Refills: 0 | Status: SHIPPED | OUTPATIENT
Start: 2024-11-06

## 2024-11-06 NOTE — PROGRESS NOTES
Michelle Wilson  1955      11/6/2024  Chief Complaint   Patient presents with    GI Problem     Positive cologuard thru is--colon recall-hx of polyps     Subjective   HPI  Michelle Wilson is a 69 y.o. female who presents as a referral for positive cologuard test incidental finding. She has had a colonoscopy in [place]. She moved here 4 years ago. Hx of polyps noted below. No associated symptoms. She has no complaints of nausea or vomiting. No change in bowels. No wt loss. No BRBPR. No melena. There is no family hx for colon cancer. No abdominal pain.  Past Medical History:   Diagnosis Date    Anxiety     Arthritis     Atherosclerosis neck vessels  04/13/2022    Bursitis and tendinitis of shoulder region     Left;  w/ small, partial tear to tendon.    Cancer     skin    Depression     HYATT (dyspnea on exertion)     Family history of early CAD     Full dentures     Hypertension     LAFB (left anterior fascicular block)     RBBB      Past Surgical History:   Procedure Laterality Date    APPENDECTOMY      BREAST BIOPSY Right     CHOLECYSTECTOMY  1978    COLONOSCOPY  11/13/2019    Dr. Colon-one 12 mm   sessile serrated adenoma polyp found to be benigh in the ascending colon, 6  polyps in the rectum ranging from 2 to 4 mm in size-4 were removed that were hyperplastic    SKIN CANCER EXCISION  2019    TUBAL ABDOMINAL LIGATION Bilateral      Outpatient Medications Marked as Taking for the 11/6/24 encounter (Office Visit) with Brandy Meier APRN   Medication Sig Dispense Refill    albuterol sulfate  (90 Base) MCG/ACT inhaler Inhale 2 puffs Every 4 (Four) Hours As Needed for Wheezing. 6.7 g 11    alendronate (FOSAMAX) 70 MG tablet Take 1 tablet by mouth Every 7 (Seven) Days. 12 tablet 3    ALPRAZolam XR (XANAX XR) 0.5 MG 24 hr tablet Take 1 tablet by mouth Every Morning. 30 tablet 5    aspirin 81 MG EC tablet Take 1 tablet by mouth Daily.      Azelastine HCl 137 MCG/SPRAY solution 2 sprays into the  nostril(s) as directed by provider 2 (Two) Times a Day. 30 mL 11    Calcium Carbonate (CALCIUM 600 PO) Take 1 tablet by mouth Daily.      etodolac (LODINE) 400 MG tablet Take 1 tablet by mouth 2 (Two) Times a Day. 180 tablet 1    Flaxseed, Linseed, (FLAX SEED OIL PO) Take  by mouth.      fluticasone (Flonase Allergy Relief) 50 MCG/ACT nasal spray 2 sprays into the nostril(s) as directed by provider Daily. 16 g 11    Fluticasone-Salmeterol (Wixela Inhub) 250-50 MCG/ACT DISKUS Inhale 1 puff 2 (Two) Times a Day. Rinse and spit after using. 60 each 11    lisinopril (PRINIVIL,ZESTRIL) 20 MG tablet TAKE ONE TABLET BY MOUTH DAILY 90 tablet 3    loratadine (CLARITIN) 10 MG tablet Take 1 tablet by mouth Daily. 90 tablet 3    Multiple Vitamins-Minerals (MULTIVITAMIN ADULT PO) Take 1 tablet by mouth Daily.      mupirocin (BACTROBAN) 2 % ointment Apply 1 Application topically to the appropriate area as directed 3 (Three) Times a Day. 22 g 0    rosuvastatin (CRESTOR) 5 MG tablet Take 1 tablet by mouth Daily. 90 tablet 3    vitamin C (ASCORBIC ACID) 500 MG tablet Take 1 tablet by mouth Daily.      VITAMIN E PO Take 1 capsule by mouth Daily.      Zinc 50 MG tablet Take 1 tablet by mouth Daily.       No Known Allergies  Social History     Socioeconomic History    Marital status:    Tobacco Use    Smoking status: Former     Current packs/day: 0.00     Average packs/day: 1 pack/day for 49.2 years (49.2 ttl pk-yrs)     Types: Cigarettes     Start date:      Quit date: 2022     Years since quittin.6     Passive exposure: Never    Smokeless tobacco: Never    Tobacco comments:     Quit 2 year ago .   Vaping Use    Vaping status: Never Used   Substance and Sexual Activity    Alcohol use: Yes     Comment: occ    Drug use: Never    Sexual activity: Not Currently     Family History   Problem Relation Age of Onset    Arthritis Mother     Hypertension Mother     Depression Mother     Cancer Maternal Grandmother      "Arthritis Paternal Grandmother     Cancer Paternal Grandmother     Breast cancer Paternal Grandmother     Cancer Other     Colon cancer Neg Hx     Colon polyps Neg Hx      Health Maintenance   Topic Date Due    COVID-19 Vaccine (7 - 2024-25 season) 09/01/2024    ANNUAL WELLNESS VISIT  02/26/2025    LUNG CANCER SCREENING  07/22/2025    LIPID PANEL  08/26/2025    MAMMOGRAM  12/28/2025    DXA SCAN  12/28/2025    COLORECTAL CANCER SCREENING  11/13/2029    TDAP/TD VACCINES (2 - Td or Tdap) 06/07/2034    HEPATITIS C SCREENING  Completed    INFLUENZA VACCINE  Completed    Pneumococcal Vaccine 65+  Completed    ZOSTER VACCINE  Completed       REVIEW OF SYSTEMS  General: well appearing, no fever chills or sweats, no unexplained wt loss  HEENT: no acute visual or hearing disturbances  Cardiovascular: No chest pain or palpitations  Pulmonary: No shortness of breath, coughing, wheezing or hemoptysis  : No burning, urgency, hematuria, or dysuria  Musculoskeletal: No joint pain or stiffness  Peripheral: no edema  Skin: No lesions or rashes  Neuro: No dizziness, headaches, stroke, syncope  Endocrine: No hot or cold intolerances  Hematological: No blood dyscrasias    Objective   Vitals:    11/06/24 1256   BP: 110/80   BP Location: Left arm   Pulse: 80   Temp: 97.1 °F (36.2 °C)   TempSrc: Temporal   SpO2: 100%   Weight: 57.8 kg (127 lb 6.4 oz)   Height: 172.7 cm (67.99\")     Body mass index is 19.38 kg/m².  BMI is within normal parameters. No other follow-up for BMI required.      PHYSICAL EXAM  General: age appropriate well nourished well appearing, no acute distress  Head: normocephalic and atraumatic  Global assessment-supple  Neck-No JVD noted, no lymphadenopathy  Pulmonary-clear to auscultation bilaterally, normal respiratory effort  Cardiovascular-normal rate and rhythm, normal heart sounds, S1 and S2 noted  Abdomen-soft, non tender, non distended, normal bowel sounds all 4 quadrants, no hepatosplenomegaly " noted  Extremities-No clubbing cyanosis or edema  Neuro-Non focal, converses appropriately, awake, alert, oriented    Assessment & Plan     Diagnoses and all orders for this visit:    1. Hx of adenomatous colonic polyps (Primary)  -     Case Request; Standing  -     Case Request    2. Positive colorectal cancer screening using Cologuard test  -     sodium-potassium-magnesium sulfates (Suprep Bowel Prep Kit) 17.5-3.13-1.6 GM/177ML solution oral solution; Take as directed by office instructions provided  Dispense: 177 mL; Refill: 0    3. HTN (hypertension), benign  Comments:  cont BP medication the day of procedure    Other orders  -     Implement Anesthesia Orders Day of Procedure; Standing  -     Follow Anesthesia Guidelines / Protocol; Future  -     Verify bowel prep was successful; Standing        COLONOSCOPY WITH ANESTHESIA (N/A)  Body mass index is 19.38 kg/m².    Patient instructions on prep prior to procedure provided to the patient.    All risks, benefits, alternatives, and indications of colonoscopy procedure have been discussed with the patient. Risks to include perforation of the colon requiring possible surgery or colostomy, risk of bleeding from biopsies or removal of colon tissue, possibility of missing a colon polyp or cancer, or adverse drug reaction.  Benefits to include the diagnosis and management of disease of the colon and rectum. Alternatives to include barium enema, radiographic evaluation, lab testing or no intervention. Pt verbalizes understanding and agrees.     Brandy Meier, APRN  2024  13:22 CST      IF YOU SMOKE OR USE TOBACCO PLEASE READ THE FOLLOWIN minutes reading provided    Why is smoking bad for me?  Smoking increases the risk of heart disease, lung disease, vascular disease, stroke, and cancer.     If you smoke, STOP!    If you would like more information on quitting smoking, please visit the [email or web address] website:  [email or web address]   This link will provide additional resources including the QUIT line and the Beat the Pack support groups.     For more information:    Quit Now [facility]  1-800-QUIT-NOW  [email or web address]

## 2024-11-06 NOTE — H&P (VIEW-ONLY)
Michelle Wilson  1955      11/6/2024  Chief Complaint   Patient presents with    GI Problem     Positive cologuard thru is--colon recall-hx of polyps     Subjective   HPI  Michelle Wilson is a 69 y.o. female who presents as a referral for positive cologuard test incidental finding. She has had a [place] in [place]. She moved here 4 years ago. Hx of polyps noted below. No associated symptoms. She has no complaints of nausea or vomiting. No change in bowels. No wt loss. No BRBPR. No melena. There is no family hx for colon cancer. No abdominal pain.  Past Medical History:   Diagnosis Date    Anxiety     Arthritis     Atherosclerosis neck vessels  04/13/2022    Bursitis and tendinitis of shoulder region     Left;  w/ small, partial tear to tendon.    Cancer     skin    Depression     HYATT (dyspnea on exertion)     Family history of early CAD     Full dentures     Hypertension     LAFB (left anterior fascicular block)     RBBB      Past Surgical History:   Procedure Laterality Date    APPENDECTOMY      BREAST BIOPSY Right     CHOLECYSTECTOMY  1978    COLONOSCOPY  11/13/2019    Dr. Colon-one 12 mm   sessile serrated adenoma polyp found to be benigh in the ascending colon, 6  polyps in the rectum ranging from 2 to 4 mm in size-4 were removed that were hyperplastic    SKIN CANCER EXCISION  2019    TUBAL ABDOMINAL LIGATION Bilateral      Outpatient Medications Marked as Taking for the 11/6/24 encounter (Office Visit) with Brandy Meier APRN   Medication Sig Dispense Refill    albuterol sulfate  (90 Base) MCG/ACT inhaler Inhale 2 puffs Every 4 (Four) Hours As Needed for Wheezing. 6.7 g 11    alendronate (FOSAMAX) 70 MG tablet Take 1 tablet by mouth Every 7 (Seven) Days. 12 tablet 3    ALPRAZolam XR (XANAX XR) 0.5 MG 24 hr tablet Take 1 tablet by mouth Every Morning. 30 tablet 5    aspirin 81 MG EC tablet Take 1 tablet by mouth Daily.      Azelastine HCl 137 MCG/SPRAY solution 2 sprays into the  nostril(s) as directed by provider 2 (Two) Times a Day. 30 mL 11    Calcium Carbonate (CALCIUM 600 PO) Take 1 tablet by mouth Daily.      etodolac (LODINE) 400 MG tablet Take 1 tablet by mouth 2 (Two) Times a Day. 180 tablet 1    Flaxseed, Linseed, (FLAX SEED OIL PO) Take  by mouth.      fluticasone (Flonase Allergy Relief) 50 MCG/ACT nasal spray 2 sprays into the nostril(s) as directed by provider Daily. 16 g 11    Fluticasone-Salmeterol (Wixela Inhub) 250-50 MCG/ACT DISKUS Inhale 1 puff 2 (Two) Times a Day. Rinse and spit after using. 60 each 11    lisinopril (PRINIVIL,ZESTRIL) 20 MG tablet TAKE ONE TABLET BY MOUTH DAILY 90 tablet 3    loratadine (CLARITIN) 10 MG tablet Take 1 tablet by mouth Daily. 90 tablet 3    Multiple Vitamins-Minerals (MULTIVITAMIN ADULT PO) Take 1 tablet by mouth Daily.      mupirocin (BACTROBAN) 2 % ointment Apply 1 Application topically to the appropriate area as directed 3 (Three) Times a Day. 22 g 0    rosuvastatin (CRESTOR) 5 MG tablet Take 1 tablet by mouth Daily. 90 tablet 3    vitamin C (ASCORBIC ACID) 500 MG tablet Take 1 tablet by mouth Daily.      VITAMIN E PO Take 1 capsule by mouth Daily.      Zinc 50 MG tablet Take 1 tablet by mouth Daily.       No Known Allergies  Social History     Socioeconomic History    Marital status:    Tobacco Use    Smoking status: Former     Current packs/day: 0.00     Average packs/day: 1 pack/day for 49.2 years (49.2 ttl pk-yrs)     Types: Cigarettes     Start date:      Quit date: 2022     Years since quittin.6     Passive exposure: Never    Smokeless tobacco: Never    Tobacco comments:     Quit 2 year ago .   Vaping Use    Vaping status: Never Used   Substance and Sexual Activity    Alcohol use: Yes     Comment: occ    Drug use: Never    Sexual activity: Not Currently     Family History   Problem Relation Age of Onset    Arthritis Mother     Hypertension Mother     Depression Mother     Cancer Maternal Grandmother      "Arthritis Paternal Grandmother     Cancer Paternal Grandmother     Breast cancer Paternal Grandmother     Cancer Other     Colon cancer Neg Hx     Colon polyps Neg Hx      Health Maintenance   Topic Date Due    COVID-19 Vaccine (7 - 2024-25 season) 09/01/2024    ANNUAL WELLNESS VISIT  02/26/2025    LUNG CANCER SCREENING  07/22/2025    LIPID PANEL  08/26/2025    MAMMOGRAM  12/28/2025    DXA SCAN  12/28/2025    COLORECTAL CANCER SCREENING  11/13/2029    TDAP/TD VACCINES (2 - Td or Tdap) 06/07/2034    HEPATITIS C SCREENING  Completed    INFLUENZA VACCINE  Completed    Pneumococcal Vaccine 65+  Completed    ZOSTER VACCINE  Completed       REVIEW OF SYSTEMS  General: well appearing, no fever chills or sweats, no unexplained wt loss  HEENT: no acute visual or hearing disturbances  Cardiovascular: No chest pain or palpitations  Pulmonary: No shortness of breath, coughing, wheezing or hemoptysis  : No burning, urgency, hematuria, or dysuria  Musculoskeletal: No joint pain or stiffness  Peripheral: no edema  Skin: No lesions or rashes  Neuro: No dizziness, headaches, stroke, syncope  Endocrine: No hot or cold intolerances  Hematological: No blood dyscrasias    Objective   Vitals:    11/06/24 1256   BP: 110/80   BP Location: Left arm   Pulse: 80   Temp: 97.1 °F (36.2 °C)   TempSrc: Temporal   SpO2: 100%   Weight: 57.8 kg (127 lb 6.4 oz)   Height: 172.7 cm (67.99\")     Body mass index is 19.38 kg/m².  BMI is within normal parameters. No other follow-up for BMI required.      PHYSICAL EXAM  General: age appropriate well nourished well appearing, no acute distress  Head: normocephalic and atraumatic  Global assessment-supple  Neck-No JVD noted, no lymphadenopathy  Pulmonary-clear to auscultation bilaterally, normal respiratory effort  Cardiovascular-normal rate and rhythm, normal heart sounds, S1 and S2 noted  Abdomen-soft, non tender, non distended, normal bowel sounds all 4 quadrants, no hepatosplenomegaly " noted  Extremities-No clubbing cyanosis or edema  Neuro-Non focal, converses appropriately, awake, alert, oriented    Assessment & Plan     Diagnoses and all orders for this visit:    1. Hx of adenomatous colonic polyps (Primary)  -     Case Request; Standing  -     Case Request    2. Positive colorectal cancer screening using Cologuard test  -     sodium-potassium-magnesium sulfates (Suprep Bowel Prep Kit) 17.5-3.13-1.6 GM/177ML solution oral solution; Take as directed by office instructions provided  Dispense: 177 mL; Refill: 0    3. HTN (hypertension), benign  Comments:  cont BP medication the day of procedure    Other orders  -     Implement Anesthesia Orders Day of Procedure; Standing  -     Follow Anesthesia Guidelines / Protocol; Future  -     Verify bowel prep was successful; Standing        COLONOSCOPY WITH ANESTHESIA (N/A)  Body mass index is 19.38 kg/m².    Patient instructions on prep prior to procedure provided to the patient.    All risks, benefits, alternatives, and indications of colonoscopy procedure have been discussed with the patient. Risks to include perforation of the colon requiring possible surgery or colostomy, risk of bleeding from biopsies or removal of colon tissue, possibility of missing a colon polyp or cancer, or adverse drug reaction.  Benefits to include the diagnosis and management of disease of the colon and rectum. Alternatives to include barium enema, radiographic evaluation, lab testing or no intervention. Pt verbalizes understanding and agrees.     Brandy Meier, APRN  2024  13:22 CST      IF YOU SMOKE OR USE TOBACCO PLEASE READ THE FOLLOWIN minutes reading provided    Why is smoking bad for me?  Smoking increases the risk of heart disease, lung disease, vascular disease, stroke, and cancer.     If you smoke, STOP!    If you would like more information on quitting smoking, please visit the [employer]" website:  [email or web address]   This link will provide additional resources including the QUIT line and the Beat the Pack support groups.     For more information:    Quit Now [facility]  1-800-QUIT-NOW  [email or web address]

## 2024-11-08 PROBLEM — Z86.0101 HX OF ADENOMATOUS COLONIC POLYPS: Status: ACTIVE | Noted: 2024-11-06

## 2024-12-03 ENCOUNTER — HOSPITAL ENCOUNTER (OUTPATIENT)
Facility: HOSPITAL | Age: 69
Setting detail: HOSPITAL OUTPATIENT SURGERY
Discharge: HOME | End: 2024-12-03
Attending: INTERNAL MEDICINE | Admitting: INTERNAL MEDICINE
Payer: MEDICARE

## 2024-12-03 ENCOUNTER — TELEPHONE (OUTPATIENT)
Dept: GASTROENTEROLOGY | Facility: CLINIC | Age: 69
End: 2024-12-03
Payer: MEDICARE

## 2024-12-03 ENCOUNTER — ANESTHESIA (OUTPATIENT)
Dept: GASTROENTEROLOGY | Facility: HOSPITAL | Age: 69
End: 2024-12-03
Payer: MEDICARE

## 2024-12-03 ENCOUNTER — ANESTHESIA EVENT (OUTPATIENT)
Dept: GASTROENTEROLOGY | Facility: HOSPITAL | Age: 69
End: 2024-12-03
Payer: MEDICARE

## 2024-12-03 VITALS
DIASTOLIC BLOOD PRESSURE: 69 MMHG | WEIGHT: 118.8 LBS | RESPIRATION RATE: 18 BRPM | HEIGHT: 68 IN | SYSTOLIC BLOOD PRESSURE: 115 MMHG | OXYGEN SATURATION: 99 % | HEART RATE: 84 BPM | TEMPERATURE: 98.1 F | BODY MASS INDEX: 18.01 KG/M2

## 2024-12-03 DIAGNOSIS — Z86.0101 HX OF ADENOMATOUS COLONIC POLYPS: ICD-10-CM

## 2024-12-03 PROCEDURE — 25010000002 PROPOFOL 10 MG/ML EMULSION: Performed by: NURSE ANESTHETIST, CERTIFIED REGISTERED

## 2024-12-03 PROCEDURE — 25010000002 LIDOCAINE PF 2% 2 % SOLUTION: Performed by: NURSE ANESTHETIST, CERTIFIED REGISTERED

## 2024-12-03 PROCEDURE — 45385 COLONOSCOPY W/LESION REMOVAL: CPT | Performed by: INTERNAL MEDICINE

## 2024-12-03 PROCEDURE — 88305 TISSUE EXAM BY PATHOLOGIST: CPT | Performed by: INTERNAL MEDICINE

## 2024-12-03 RX ORDER — LIDOCAINE HYDROCHLORIDE 10 MG/ML
0.5 INJECTION, SOLUTION EPIDURAL; INFILTRATION; INTRACAUDAL; PERINEURAL ONCE AS NEEDED
Status: DISCONTINUED | OUTPATIENT
Start: 2024-12-03 | End: 2024-12-03 | Stop reason: HOSPADM

## 2024-12-03 RX ORDER — PROPOFOL 10 MG/ML
VIAL (ML) INTRAVENOUS AS NEEDED
Status: DISCONTINUED | OUTPATIENT
Start: 2024-12-03 | End: 2024-12-03 | Stop reason: SURG

## 2024-12-03 RX ORDER — LIDOCAINE HYDROCHLORIDE 20 MG/ML
INJECTION, SOLUTION EPIDURAL; INFILTRATION; INTRACAUDAL; PERINEURAL AS NEEDED
Status: DISCONTINUED | OUTPATIENT
Start: 2024-12-03 | End: 2024-12-03 | Stop reason: SURG

## 2024-12-03 RX ORDER — SODIUM CHLORIDE 0.9 % (FLUSH) 0.9 %
10 SYRINGE (ML) INJECTION AS NEEDED
Status: DISCONTINUED | OUTPATIENT
Start: 2024-12-03 | End: 2024-12-03 | Stop reason: HOSPADM

## 2024-12-03 RX ORDER — SODIUM CHLORIDE 9 MG/ML
500 INJECTION, SOLUTION INTRAVENOUS ONCE
Status: DISCONTINUED | OUTPATIENT
Start: 2024-12-03 | End: 2024-12-03 | Stop reason: HOSPADM

## 2024-12-03 RX ADMIN — PROPOFOL 200 MG: 10 INJECTION, EMULSION INTRAVENOUS at 12:25

## 2024-12-03 RX ADMIN — LIDOCAINE HYDROCHLORIDE 100 MG: 20 INJECTION, SOLUTION EPIDURAL; INFILTRATION; INTRACAUDAL; PERINEURAL at 12:25

## 2024-12-03 RX ADMIN — Medication 10 ML: at 12:18

## 2024-12-03 NOTE — ANESTHESIA POSTPROCEDURE EVALUATION
"Patient: Michelle Wilson    Procedure Summary       Date: 12/03/24 Room / Location:  [facility] /  [facility]    Anesthesia Start: 1223 Anesthesia Stop: 1240    Procedure: COLONOSCOPY WITH ANESTHESIA Diagnosis:       Hx of adenomatous colonic polyps      (Hx of adenomatous colonic polyps [Z86.0101])    Surgeons: Christopher Alanis DO Provider: Clementine Cha CRNA    Anesthesia Type: MAC ASA Status: 2            Anesthesia Type: MAC    Vitals  Vitals Value Taken Time   /69 12/03/24 1256   Temp     Pulse 84 12/03/24 1257   Resp 18 12/03/24 1255   SpO2 99 % 12/03/24 1257   Vitals shown include unfiled device data.        Post Anesthesia Care and Evaluation    Patient location during evaluation: PHASE II  Patient participation: complete - patient participated  Level of consciousness: awake and awake and alert  Pain score: 0  Pain management: adequate    Airway patency: patent  Anesthetic complications: No anesthetic complications  PONV Status: none  Cardiovascular status: acceptable  Respiratory status: acceptable  Hydration status: acceptable    Comments: Patient discharged according to acceptable Demi score per RN assessment. See nursing records for further information.     Blood pressure 115/69, pulse 84, temperature 98.1 °F (36.7 °C), temperature source Temporal, resp. rate 18, height 172.7 cm (68\"), weight 53.9 kg (118 lb 12.8 oz), SpO2 99%, not currently breastfeeding.      "

## 2024-12-03 NOTE — ANESTHESIA PREPROCEDURE EVALUATION
Anesthesia Evaluation     Patient summary reviewed   no history of anesthetic complications:   NPO Solid Status: > 8 hours  NPO Liquid Status: > 4 hours           Airway   Mallampati: II  Dental      Pulmonary    (+) a smoker,  (-) COPD, asthma, sleep apnea  Cardiovascular   Exercise tolerance: excellent (>7 METS)    (+) hypertension, HYATT  (-) pacemaker, past MI, angina, cardiac stents      Neuro/Psych  (-) seizures, TIA, CVA  GI/Hepatic/Renal/Endo    (-) GERD, liver disease, no renal disease, diabetes    Musculoskeletal     Abdominal    Substance History      OB/GYN          Other   arthritis,                   Anesthesia Plan    ASA 2     MAC       Anesthetic plan, risks, benefits, and alternatives have been provided, discussed and informed consent has been obtained with: patient.

## 2024-12-04 RX ORDER — LISINOPRIL 20 MG/1
20 TABLET ORAL DAILY
Qty: 90 TABLET | Refills: 3 | Status: SHIPPED | OUTPATIENT
Start: 2024-12-04

## 2024-12-05 LAB
CYTO UR: NORMAL
LAB AP CASE REPORT: NORMAL
Lab: NORMAL
PATH REPORT.FINAL DX SPEC: NORMAL
PATH REPORT.GROSS SPEC: NORMAL

## 2024-12-05 RX ORDER — ETODOLAC 400 MG/1
400 TABLET, FILM COATED ORAL 2 TIMES DAILY
Qty: 180 TABLET | Refills: 1 | Status: SHIPPED | OUTPATIENT
Start: 2024-12-05

## 2024-12-06 RX ORDER — ROSUVASTATIN CALCIUM 5 MG/1
5 TABLET, COATED ORAL DAILY
Qty: 90 TABLET | Refills: 3 | Status: SHIPPED | OUTPATIENT
Start: 2024-12-06

## 2024-12-06 RX ORDER — ROSUVASTATIN CALCIUM 5 MG/1
5 TABLET, COATED ORAL DAILY
Qty: 90 TABLET | Refills: 3 | OUTPATIENT
Start: 2024-12-06

## 2024-12-18 ENCOUNTER — APPOINTMENT (OUTPATIENT)
Dept: GENERAL RADIOLOGY | Facility: HOSPITAL | Age: 69
End: 2024-12-18
Payer: MEDICARE

## 2024-12-18 PROCEDURE — 71046 X-RAY EXAM CHEST 2 VIEWS: CPT

## 2024-12-27 LAB
NCCN CRITERIA FLAG: NORMAL
TYRER CUZICK SCORE: 4.1

## 2024-12-30 ENCOUNTER — HOSPITAL ENCOUNTER (OUTPATIENT)
Dept: MAMMOGRAPHY | Facility: HOSPITAL | Age: 69
Discharge: HOME | End: 2024-12-30
Payer: MEDICARE

## 2024-12-30 DIAGNOSIS — Z12.31 ENCOUNTER FOR SCREENING MAMMOGRAM FOR MALIGNANT NEOPLASM OF BREAST: ICD-10-CM

## 2024-12-30 PROCEDURE — 77067 SCR MAMMO BI INCL CAD: CPT

## 2024-12-30 PROCEDURE — 77063 BREAST TOMOSYNTHESIS BI: CPT

## 2025-01-20 ENCOUNTER — HOSPITAL ENCOUNTER (OUTPATIENT)
Dept: CT IMAGING | Facility: HOSPITAL | Age: 70
Discharge: HOME | End: 2025-01-20
Admitting: INTERNAL MEDICINE
Payer: MEDICARE

## 2025-01-20 DIAGNOSIS — R91.8 LUNG NODULES: Chronic | ICD-10-CM

## 2025-01-20 PROCEDURE — 71250 CT THORAX DX C-: CPT

## 2025-01-21 ENCOUNTER — TELEPHONE (OUTPATIENT)
Dept: PULMONOLOGY | Facility: CLINIC | Age: 70
End: 2025-01-21
Payer: MEDICARE

## 2025-01-21 NOTE — TELEPHONE ENCOUNTER
----- Message from Keshawn Martin sent at 1/21/2025  1:26 PM CST -----  Please call the patient let her know I reviewed her CAT scan performed yesterday and the main nodule we have been following in the right upper lobe actually has decreased in size compared to her scan from 6 months ago and otherwise her scan was stable again with no new or suspicious nodules.  I will review it with her in much more detail when she comes in to see me later this week.

## 2025-01-24 ENCOUNTER — OFFICE VISIT (OUTPATIENT)
Dept: PULMONOLOGY | Facility: CLINIC | Age: 70
End: 2025-01-24
Payer: MEDICARE

## 2025-01-24 VITALS
BODY MASS INDEX: 18.19 KG/M2 | OXYGEN SATURATION: 95 % | DIASTOLIC BLOOD PRESSURE: 68 MMHG | HEIGHT: 68 IN | SYSTOLIC BLOOD PRESSURE: 110 MMHG | HEART RATE: 119 BPM | WEIGHT: 120 LBS

## 2025-01-24 DIAGNOSIS — J44.9 COPD, MODERATE: Chronic | ICD-10-CM

## 2025-01-24 DIAGNOSIS — J43.2 CENTRILOBULAR EMPHYSEMA: Chronic | ICD-10-CM

## 2025-01-24 DIAGNOSIS — R91.8 LUNG NODULES: Primary | Chronic | ICD-10-CM

## 2025-01-24 DIAGNOSIS — R63.6 UNDERWEIGHT: Chronic | ICD-10-CM

## 2025-01-24 DIAGNOSIS — Z87.891 PERSONAL HISTORY OF NICOTINE DEPENDENCE: Chronic | ICD-10-CM

## 2025-01-24 NOTE — PROGRESS NOTES
Chief Complaint  Lung Nodule and COPD    Subjective    History of Present Illness {  Problem List  Visit Diagnosis   Encounters  Notes  Medications  Labs  Result Review Imaging  Media: 23}    Michelle Wilson presents to [facility] for lung nodule and COPD.    History of Present Illness   The patient had some respiratory infection symptoms recently like due to viral infection but eventually did improve.  She is quite short of breath had some cough but this has improved significantly.  Her CT performed earlier this week revealed decrease in the size of her right upper lobe nodule I reviewed the scan with her.  It was stable otherwise.  At this point I think we go to once yearly scans.  I will plan a follow-up visit in several months with pulmonary functions.  She has had the COVID-19 vaccine including 2 standard boosters and a Spikevax booster in the form of the Moderna vaccine and is also received a community Pfizer booster.  She had the flu shot this past October and has had a Prevnar 13 and Pneumovax in the past as well.Michelle Wilson  reports that she quit smoking about 2 years ago. Her smoking use included cigarettes. She started smoking about 52 years ago. She has a 49.2 pack-year smoking history. She has never been exposed to tobacco smoke. She has never used smokeless tobacco.           Current Outpatient Medications:     albuterol sulfate  (90 Base) MCG/ACT inhaler, Inhale 2 puffs Every 4 (Four) Hours As Needed for Wheezing or Shortness of Air., Disp: 6.7 g, Rfl: 0    alendronate (FOSAMAX) 70 MG tablet, Take 1 tablet by mouth Every 7 (Seven) Days., Disp: 12 tablet, Rfl: 3    ALPRAZolam XR (XANAX XR) 0.5 MG 24 hr tablet, Take 1 tablet by mouth Every Morning., Disp: 30 tablet, Rfl: 5    aspirin 81 MG EC tablet, Take 1 tablet by mouth Daily., Disp: , Rfl:     Azelastine HCl 137 MCG/SPRAY solution, 2 sprays into the nostril(s) as directed  "by provider 2 (Two) Times a Day., Disp: 30 mL, Rfl: 11    Calcium Carbonate (CALCIUM 600 PO), Take 1 tablet by mouth Daily., Disp: , Rfl:     etodolac (LODINE) 400 MG tablet, TAKE 1 TABLET BY MOUTH 2 (TWO) TIMES A DAY., Disp: 180 tablet, Rfl: 1    Flaxseed, Linseed, (FLAX SEED OIL PO), Take  by mouth., Disp: , Rfl:     fluticasone (Flonase Allergy Relief) 50 MCG/ACT nasal spray, 2 sprays into the nostril(s) as directed by provider Daily., Disp: 16 g, Rfl: 11    Fluticasone-Salmeterol (Wixela Inhub) 250-50 MCG/ACT DISKUS, Inhale 1 puff 2 (Two) Times a Day. Rinse and spit after using., Disp: 60 each, Rfl: 11    lisinopril (PRINIVIL,ZESTRIL) 20 MG tablet, TAKE 1 TABLET BY MOUTH ONCE DAILY, Disp: 90 tablet, Rfl: 3    loratadine (CLARITIN) 10 MG tablet, Take 1 tablet by mouth Daily., Disp: 90 tablet, Rfl: 3    Multiple Vitamins-Minerals (MULTIVITAMIN ADULT PO), Take 1 tablet by mouth Daily., Disp: , Rfl:     rosuvastatin (CRESTOR) 5 MG tablet, Take 1 tablet by mouth Daily., Disp: 90 tablet, Rfl: 3    vitamin C (ASCORBIC ACID) 500 MG tablet, Take 1 tablet by mouth Daily., Disp: , Rfl:     VITAMIN E PO, Take 1 capsule by mouth Daily., Disp: , Rfl:     Zinc 50 MG tablet, Take 1 tablet by mouth Daily., Disp: , Rfl:     Social History     Socioeconomic History    Marital status:    Tobacco Use    Smoking status: Former     Current packs/day: 0.00     Average packs/day: 1 pack/day for 49.2 years (49.2 ttl pk-yrs)     Types: Cigarettes     Start date:      Quit date: 2022     Years since quittin.9     Passive exposure: Never    Smokeless tobacco: Never    Tobacco comments:     Quit 2 year ago .   Vaping Use    Vaping status: Never Used   Substance and Sexual Activity    Alcohol use: Yes     Comment: occ    Drug use: Never    Sexual activity: Not Currently       Objective   Vital Signs:   /68   Pulse 119   Ht 172.7 cm (67.99\")   Wt 54.4 kg (120 lb)   SpO2 95% Comment: RA  BMI 18.25 kg/m²   "   Physical Exam  Vitals and nursing note reviewed.   Constitutional:       Comments: She was tachycardic with a pulse of 119 initially.  After sitting and resting exam room for a few minutes her pulse was down into the mid 90s.  Her BMI was somewhat low.   HENT:      Head: Normocephalic.   Eyes:      Pupils: Pupils are equal, round, and reactive to light.   Cardiovascular:      Rate and Rhythm: Regular rhythm. Tachycardia present.      Comments: Again she was somewhat tachycardic initially but her pulse came down to the mid 90s after sitting and resting the exam room for a few minutes.  Pulmonary:      Effort: Pulmonary effort is normal.      Comments: Breath sounds are diminished but no adventitious sounds are heard.  Musculoskeletal:         General: Normal range of motion.   Skin:     General: Skin is warm and dry.   Neurological:      General: No focal deficit present.      Mental Status: She is alert and oriented to person, place, and time.   Psychiatric:         Mood and Affect: Mood normal.         Behavior: Behavior normal.        Result Review :    PFT Values          5/10/2023    15:30 2024    15:00   Pre Drug PFT Results   FVC 99 101   FEV1 61 61   FEF 25-75% 24 21   FEV1/FVC 48 47   Other Tests PFT Results    167    260   DLCO 79 68   D/VAsb 74 63         Results for orders placed in visit on 24    Spirometry with Diffusion Capacity & Lung Volumes    Narrative  Spirometry with Diffusion Capacity & Lung Volumes    Performed by: Madison Siddiqui, RRT  Authorized by: Keshawn Martin MD  Pre Drug % Predicted  FVC: 101%  FEV1: 61%  FEF 25-75%: 21%  FEV1/FVC: 47%  T%  RV: 260%  DLCO: 68%  D/VAsb: 63%    Interpretation  Spirometry  Spirometry shows moderate obstruction.  Lung Volume Measurements  Measurements show elevated residual volume consistent with gas trapping.  Diffusion Capacity  The patient's diffusion capacity is mildly reduced.  Diffusion capacity is mildly  reduced when corrected for alveolar volume.  Overall comments: The patient's spirometry is consistent with a moderate obstructive ventilatory defect.  Lung volumes reveal severe hyperinflation.  There is a mild diffusion impairment particularly when corrected for alveolar volume.  When current studies are compared to studies from May 10, 2023, there is no significant change in the patient's spirometry compared to previous.  The degree of hyperinflation has worsened compared to previous.  When corrected for alveolar volume there has been a decline in diffusion capacity.  To previous as well.      Results for orders placed in visit on 05/10/23    Full Pulmonary Function Test Without Bronchodilator    Narrative  Full Pulmonary Function Test Without Bronchodilator  Performed by: Madison Siddiqui, RRT  Authorized by: Keshawn Martin MD    Pre Drug % Predicted  FVC: 99%  FEV1: 61%  FEF 25-75%: 24%  FEV1/FVC: 48%  T%  RV: 168%  DLCO: 79%  D/VAsb: 74%    Interpretation  Spirometry  Spirometry shows moderate obstruction.  Lung Volume Measurements  Measurements show elevated residual volume consistent with gas trapping.  Diffusion Capacity  The patient's diffusion capacity is mildly reduced.  Diffusion capacity is mildly reduced when corrected for alveolar volume.  Overall comments: The patient's spirometry is consistent with a moderate obstructive ventilatory defect.  Lung volumes reveal hyperinflation.  There is a mild diffusion impairment even when corrected for alveolar volume.  When current studies are compared to studies performed at [facility] on May 3, 2022, the patient's current baseline spirometry reveals a slight drop in her FVC but some improvement in her FEV1 compared to previous prebronchodilator values.  Her current baseline spirometry reveals a decline in her FVC and a slight decline in her FEV1 compared to previous postbronchodilator values.  Her current lung volumes reveal  improvement in the degree of hyperinflation compared to previous studies.      Results for orders placed during the hospital encounter of 05/03/22    Full Pulmonary Function Test With Bronchodilator    Narrative  [facility] - Pulmonary Function Test    [facility]  [place]  [address]  [phone]    Patient : Edel Wilson  MRN : [id number]  CSN : [id number]  Pulmonologist : Keshawn Martin MD  Date : 5/3/2022    ______________________________________________________________________    Interpretation :  1.  Spirometry is consistent with a moderate bordering on severe obstructive ventilatory defect.  2.  There is improvement in spirometry postbronchodilator but a moderate obstructive ventilatory defect is still present.  3.  Lung volumes reveal hyperinflation.      Keshawn Martin MD                 My interpretation of imaging:    CT Chest Without Contrast Diagnostic (01/20/2025 14:08)         Assessment and Plan {CC Problem List  Visit Diagnosis  ROS  Review (Popup)  Health Maintenance  Quality  BestPractice  Medications  SmartSets  SnapShot Encounters  Media : 23}    Diagnoses and all orders for this visit:    1. Lung nodules (Primary)  Assessment & Plan:  Again the right upper lobe nodule that had been approximate millimeters in size previously had decreased in size to 6 mm.  I told her we will go back to once yearly scans based on the improvement in her current scan in terms of this nodule and its stability otherwise.      2. COPD, moderate  Assessment & Plan:  She will continue her Wixela Inhub and as needed albuterol HFA.    Orders:  -     Spirometry with Diffusion Capacity & Lung Volumes; Future    3. Centrilobular emphysema  Assessment & Plan:  This has been noted on prior imaging studies and is associated with her COPD.      4. Personal history of nicotine dependence  Assessment & Plan:  Michelle Wilsno  reports that she quit smoking about 2 years ago. Her  smoking use included cigarettes. She started smoking about 52 years ago. She has a 49.2 pack-year smoking history. She has never been exposed to tobacco smoke. She has never used smokeless tobacco.         5. Underweight  Assessment & Plan:  She will follow-up with her primary healthcare provider regarding her low BMI.            Keshawn Martin MD  1/24/2025  15:18 CST    Follow Up   Return in about 4 months (around 5/21/2025) for To see me specifically, Complete PFT.    Patient was given instructions and counseling regarding her condition or for health maintenance advice. Please see specific information pulled into the AVS if appropriate.

## 2025-01-24 NOTE — ASSESSMENT & PLAN NOTE
Michelle Wilson  reports that she quit smoking about 2 years ago. Her smoking use included cigarettes. She started smoking about 52 years ago. She has a 49.2 pack-year smoking history. She has never been exposed to tobacco smoke. She has never used smokeless tobacco.

## 2025-01-24 NOTE — ASSESSMENT & PLAN NOTE
Again the right upper lobe nodule that had been approximate millimeters in size previously had decreased in size to 6 mm.  I told her we will go back to once yearly scans based on the improvement in her current scan in terms of this nodule and its stability otherwise.

## 2025-03-04 ENCOUNTER — OFFICE VISIT (OUTPATIENT)
Dept: INTERNAL MEDICINE | Facility: CLINIC | Age: 70
End: 2025-03-04
Payer: MEDICARE

## 2025-03-04 VITALS
OXYGEN SATURATION: 100 % | BODY MASS INDEX: 18.79 KG/M2 | HEART RATE: 79 BPM | DIASTOLIC BLOOD PRESSURE: 70 MMHG | SYSTOLIC BLOOD PRESSURE: 152 MMHG | WEIGHT: 124 LBS | HEIGHT: 68 IN | TEMPERATURE: 97.9 F

## 2025-03-04 DIAGNOSIS — R53.83 OTHER FATIGUE: ICD-10-CM

## 2025-03-04 DIAGNOSIS — F41.1 GAD (GENERALIZED ANXIETY DISORDER): ICD-10-CM

## 2025-03-04 DIAGNOSIS — Z12.31 ENCOUNTER FOR SCREENING MAMMOGRAM FOR MALIGNANT NEOPLASM OF BREAST: ICD-10-CM

## 2025-03-04 DIAGNOSIS — J44.9 COPD, MODERATE: ICD-10-CM

## 2025-03-04 DIAGNOSIS — E78.89 ELEVATED HDL: ICD-10-CM

## 2025-03-04 DIAGNOSIS — J30.9 ALLERGIC RHINITIS, UNSPECIFIED SEASONALITY, UNSPECIFIED TRIGGER: ICD-10-CM

## 2025-03-04 DIAGNOSIS — M54.50 CHRONIC MIDLINE LOW BACK PAIN WITHOUT SCIATICA: ICD-10-CM

## 2025-03-04 DIAGNOSIS — G89.29 CHRONIC MIDLINE LOW BACK PAIN WITHOUT SCIATICA: ICD-10-CM

## 2025-03-04 DIAGNOSIS — M81.0 AGE-RELATED OSTEOPOROSIS WITHOUT CURRENT PATHOLOGICAL FRACTURE: ICD-10-CM

## 2025-03-04 DIAGNOSIS — Z78.0 POSTMENOPAUSAL: ICD-10-CM

## 2025-03-04 DIAGNOSIS — E55.9 VITAMIN D DEFICIENCY: ICD-10-CM

## 2025-03-04 DIAGNOSIS — Z00.00 ENCOUNTER FOR SUBSEQUENT ANNUAL WELLNESS VISIT (AWV) IN MEDICARE PATIENT: Primary | ICD-10-CM

## 2025-03-04 DIAGNOSIS — R63.6 UNDERWEIGHT: ICD-10-CM

## 2025-03-04 DIAGNOSIS — Z79.899 ENCOUNTER FOR LONG-TERM (CURRENT) USE OF MEDICATIONS: ICD-10-CM

## 2025-03-04 DIAGNOSIS — I10 ESSENTIAL HYPERTENSION: Chronic | ICD-10-CM

## 2025-03-04 LAB
AMPHET+METHAMPHET UR QL: NEGATIVE
AMPHETAMINE INTERNAL CONTROL: ABNORMAL
AMPHETAMINES UR QL: NEGATIVE
BARBITURATE INTERNAL CONTROL: ABNORMAL
BARBITURATES UR QL SCN: NEGATIVE
BENZODIAZ UR QL SCN: POSITIVE
BENZODIAZEPINE INTERNAL CONTROL: ABNORMAL
BUPRENORPHINE INTERNAL CONTROL: ABNORMAL
BUPRENORPHINE SERPL-MCNC: NEGATIVE NG/ML
CANNABINOIDS SERPL QL: NEGATIVE
COCAINE INTERNAL CONTROL: ABNORMAL
COCAINE UR QL: NEGATIVE
EXPIRATION DATE: ABNORMAL
Lab: ABNORMAL
MDMA (ECSTASY) INTERNAL CONTROL: ABNORMAL
MDMA UR QL SCN: NEGATIVE
METHADONE INTERNAL CONTROL: ABNORMAL
METHADONE UR QL SCN: NEGATIVE
METHAMPHETAMINE INTERNAL CONTROL: ABNORMAL
MORPHINE INTERNAL CONTROL: ABNORMAL
MORPHINE/OPIATES SCREEN, URINE: NEGATIVE
OXYCODONE INTERNAL CONTROL: ABNORMAL
OXYCODONE UR QL SCN: NEGATIVE
PCP UR QL SCN: NEGATIVE
PHENCYCLIDINE INTERNAL CONTROL: ABNORMAL
PROPOXYPH UR QL SCN: NEGATIVE
PROPOXYPHENE INTERNAL CONTROL: ABNORMAL
THC INTERNAL CONTROL: ABNORMAL
TRICYCLIC ANTIDEPRESSANTS INTERNAL CONTROL: ABNORMAL
TRICYCLICS UR QL SCN: NEGATIVE

## 2025-03-04 RX ORDER — BUSPIRONE HYDROCHLORIDE 10 MG/1
10 TABLET ORAL NIGHTLY
Qty: 7 TABLET | Refills: 0 | Status: SHIPPED | OUTPATIENT
Start: 2025-03-04 | End: 2025-03-10 | Stop reason: SDUPTHER

## 2025-03-04 RX ORDER — LEVOCETIRIZINE DIHYDROCHLORIDE 5 MG/1
5 TABLET, FILM COATED ORAL EVERY EVENING
Qty: 90 TABLET | Refills: 3 | Status: SHIPPED | OUTPATIENT
Start: 2025-03-04

## 2025-03-04 NOTE — PROGRESS NOTES
Subjective   The ABCs of the Annual Wellness Visit  Medicare Wellness Visit      Michelle Wilson is a 69 y.o. patient who presents for a Medicare Wellness Visit.    The following portions of the patient's history were reviewed and   updated as appropriate: allergies, current medications, past family history, past medical history, past social history, past surgical history, and problem list.    Compared to one year ago, the patient's physical   health is the same.  Compared to one year ago, the patient's mental   health is the same.    Recent Hospitalizations:  She was not admitted to the hospital during the last year.     Current Medical Providers:  Patient Care Team:  Krissy Cardoso APRN as PCP - General (Internal Medicine)  Giovanni Lino MD as Cardiologist (Cardiology)  Geo Duggan MD as Consulting Physician (Pulmonary Disease)  Keshawn Martin MD as Consulting Physician (Pulmonary Disease)    Outpatient Medications Prior to Visit   Medication Sig Dispense Refill    albuterol sulfate  (90 Base) MCG/ACT inhaler Inhale 2 puffs Every 4 (Four) Hours As Needed for Wheezing or Shortness of Air. 6.7 g 0    alendronate (FOSAMAX) 70 MG tablet Take 1 tablet by mouth Every 7 (Seven) Days. 12 tablet 3    ALPRAZolam XR (XANAX XR) 0.5 MG 24 hr tablet Take 1 tablet by mouth Every Morning. 30 tablet 5    aspirin 81 MG EC tablet Take 1 tablet by mouth Daily.      Azelastine HCl 137 MCG/SPRAY solution 2 sprays into the nostril(s) as directed by provider 2 (Two) Times a Day. 30 mL 11    Calcium Carbonate (CALCIUM 600 PO) Take 1 tablet by mouth Daily.      etodolac (LODINE) 400 MG tablet TAKE 1 TABLET BY MOUTH 2 (TWO) TIMES A DAY. 180 tablet 1    Flaxseed, Linseed, (FLAX SEED OIL PO) Take  by mouth.      fluticasone (Flonase Allergy Relief) 50 MCG/ACT nasal spray 2 sprays into the nostril(s) as directed by provider Daily. 16 g 11    Fluticasone-Salmeterol (Wixela Inhub) 250-50 MCG/ACT DISKUS Inhale 1 puff  2 (Two) Times a Day. Rinse and spit after using. 60 each 11    lisinopril (PRINIVIL,ZESTRIL) 20 MG tablet TAKE 1 TABLET BY MOUTH ONCE DAILY 90 tablet 3    Multiple Vitamins-Minerals (MULTIVITAMIN ADULT PO) Take 1 tablet by mouth Daily.      rosuvastatin (CRESTOR) 5 MG tablet Take 1 tablet by mouth Daily. 90 tablet 3    vitamin C (ASCORBIC ACID) 500 MG tablet Take 1 tablet by mouth Daily.      VITAMIN E PO Take 1 capsule by mouth Daily.      Zinc 50 MG tablet Take 1 tablet by mouth Daily.      loratadine (CLARITIN) 10 MG tablet Take 1 tablet by mouth Daily. 90 tablet 3     No facility-administered medications prior to visit.     No opioid medication identified on active medication list. I have reviewed chart for other potential  high risk medication/s and harmful drug interactions in the elderly.      Aspirin is on active medication list. Aspirin use is indicated based on review of current medical condition/s. Pros and cons of this therapy have been discussed today. Benefits of this medication outweigh potential harm.  Patient has been encouraged to continue taking this medication.  .      Patient Active Problem List   Diagnosis    Essential hypertension    SOB (shortness of breath) on exertion    RBBB    LAFB (left anterior fascicular block)    Anxiety and depression    Chronic midline low back pain without sciatica    Family history of early CAD    Atherosclerosis neck vessels     Ex-smoker for less than 1 year    COPD, moderate    Non-seasonal allergic rhinitis    Granulomatous lung disease    Centrilobular emphysema    Basal cell carcinoma (BCC) of left lower extremity    Age-related osteoporosis without current pathological fracture    Lung nodules    Personal history of nicotine dependence    Post-COVID-19 condition    AV block, 1st degree    Dog bite of multiple sites    Hx of adenomatous colonic polyps    Underweight     Advance Care Planning Advance Directive is not on file.  ACP discussion was held with the  "patient during this visit. Patient does not have an advance directive, information provided.            Objective   Vitals:    03/04/25 1354   BP: 152/70   BP Location: Left arm   Patient Position: Sitting   Cuff Size: Adult   Pulse: 79   Temp: 97.9 °F (36.6 °C)   TempSrc: Temporal   SpO2: 100%   Weight: 56.2 kg (124 lb)   Height: 172.7 cm (67.99\")   PainSc: 0-No pain       Estimated body mass index is 18.86 kg/m² as calculated from the following:    Height as of this encounter: 172.7 cm (67.99\").    Weight as of this encounter: 56.2 kg (124 lb).    BMI is within normal parameters. No other follow-up for BMI required.           Does the patient have evidence of cognitive impairment? No                                                                                                Health  Risk Assessment    Smoking Status:  Social History     Tobacco Use   Smoking Status Former    Current packs/day: 0.00    Average packs/day: 1 pack/day for 49.2 years (49.2 ttl pk-yrs)    Types: Cigarettes    Start date: 1973    Quit date: 2/27/2022    Years since quitting: 3.0    Passive exposure: Never   Smokeless Tobacco Never   Tobacco Comments    Quit 2 year ago 2022.     Alcohol Consumption:  Social History     Substance and Sexual Activity   Alcohol Use Yes    Comment: occ       Fall Risk Screen  STEADI Fall Risk Assessment was completed, and patient is at HIGH risk for falls. Assessment completed on:3/4/2025    Depression Screening   Little interest or pleasure in doing things? Not at all   Feeling down, depressed, or hopeless? Not at all   PHQ-2 Total Score 0      Health Habits and Functional and Cognitive Screening:      3/4/2025     1:58 PM   Functional & Cognitive Status   Do you have difficulty preparing food and eating? No   Do you have difficulty bathing yourself, getting dressed or grooming yourself? No   Do you have difficulty using the toilet? No   Do you have difficulty moving around from place to place? No   Do you " have trouble with steps or getting out of a bed or a chair? No   Current Diet Well Balanced Diet   Dental Exam Other   Eye Exam Up to date   Exercise (times per week) 7 times per week   Current Exercises Include Walking   Do you need help using the phone?  No   Are you deaf or do you have serious difficulty hearing?  No   Do you need help to go to places out of walking distance? No   Do you need help shopping? No   Do you need help preparing meals?  No   Do you need help with housework?  No   Do you need help with laundry? No   Do you need help taking your medications? No   Do you need help managing money? No   Do you ever drive or ride in a car without wearing a seat belt? No   Have you felt unusual stress, anger or loneliness in the last month? No   Who do you live with? Child   If you need help, do you have trouble finding someone available to you? No   Have you been bothered in the last four weeks by sexual problems? No   Do you have difficulty concentrating, remembering or making decisions? No           Age-appropriate Screening Schedule:  Refer to the list below for future screening recommendations based on patient's age, sex and/or medical conditions. Orders for these recommended tests are listed in the plan section. The patient has been provided with a written plan.    Health Maintenance List  Health Maintenance   Topic Date Due    LIPID PANEL  08/26/2025    DXA SCAN  12/28/2025    LUNG CANCER SCREENING  01/20/2026    ANNUAL WELLNESS VISIT  03/04/2026    MAMMOGRAM  12/30/2026    COLORECTAL CANCER SCREENING  12/03/2029    TDAP/TD VACCINES (2 - Td or Tdap) 06/07/2034    HEPATITIS C SCREENING  Completed    COVID-19 Vaccine  Completed    INFLUENZA VACCINE  Completed    Pneumococcal Vaccine 50+  Completed    ZOSTER VACCINE  Completed                                                                                                                                                CMS Preventative Services Quick  Reference  Risk Factors Identified During Encounter  Vision Screening Recommended    The above risks/problems have been discussed with the patient.  Pertinent information has been shared with the patient in the After Visit Summary.  An After Visit Summary and PPPS were made available to the patient.    Follow Up:   Next Medicare Wellness visit to be scheduled in 1 year.         Additional E&M Note during same encounter follows:  Patient has additional, significant, and separately identifiable condition(s)/problem(s) that require work above and beyond the Medicare Wellness Visit     Chief Complaint  Medicare Wellness-subsequent    Subjective    HPI  Michelle is also being seen today for an annual adult preventative physical exam.        The patient is a 69-year-old female who presents to the office today for her annual Medicare wellness visit. She is followed primarily for management of her hypertension, anxiety, depression, chronic low back pain, age-related osteoporosis, COPD, and chronic allergies. An additional issue to discuss today is her underweight status.    She reports no issues with her current lisinopril regimen, which she takes in the evening. Her blood pressure was recorded as 110/68 during her last visit with Dr. Pickering. She reports no chest pain or vision changes. She is on lisinopril 20 mg once daily for hypertension.    She has been experiencing sleep disturbances for the past year, characterized by light sleep and easy awakenings. She has not undergone a sleep study and has not been reported to snore during sleep. She has tried melatonin but found it caused morning drowsiness. She consistently takes Xanax in the morning to manage her anxiety levels throughout the workday. She takes Xanax 0.5 mg daily every morning and has for quite some time.    She has been on Claritin for approximately 2 years, taken in the morning, but reports it is not as effective as it used to be. She has not yet tried Xyzal.  She takes a combination of loratadine and Astelin for the management of her allergies.    She is currently fasting and reports no significant hunger. She experienced a viral illness in January 2025, which resulted in a two-week period of breathlessness and fever. She was not tested for the virus, but Dr. Victor suspected RSV. She has since recovered and reports an increase in her food intake. She does not perceive any weight loss at home and reports a good appetite. She maintains her strength and muscle tone through regular exercise, including walking three miles daily. She reports no urinary issues or numbness or tingling in her hands or feet. She notes rapid nail growth and gets her hair cut monthly. She has dentures and undergoes annual eye exams, with no changes noted since her cataract surgery.    She is on Fosamax, vitamin D, and calcium replacement for osteoporosis. She is physically active and does weight-bearing exercises.    She takes flaxseed to help with elevated cholesterol. Her HDL is unusually elevated consistently, with the most recent HDL at 119 in August 2024, an improvement from a previous HDL cholesterol of 146. Her LDL has maintained very well, last checked at 49. She is on rosuvastatin 5 mg daily.    She follows with pulmonology regarding lung nodules, COPD, granulomatous lung disease, and centrilobular emphysema. She has albuterol as needed and takes Wixela as a maintenance inhaler.    She follows with cardiology for her history of right bundle branch block, left anterior fascicular block, and first-degree AV block. She takes daily 81 mg aspirin.    She is due for an updated urine drug screen (UDS) as well as comprehensive labs. Her prescription drug monitoring program (PDMP) has been reviewed and appears appropriate. The last fill date for Xanax was 02/07/2025.    She is currently up to date on mammography, last completed in December 2024, which was normal. She also last completed her  "colonoscopy for colorectal cancer screening in December 2024, and it was unremarkable other than a polyp being removed with recommendations to repeat colonoscopy in 5 years for surveillance.    MEDICATIONS  Fosamax, vitamin D, calcium, flaxseed, rosuvastatin, albuterol, aspirin, loratadine, Astelin, Wixela, lisinopril, zinc, vitamin E, vitamin C, Xanax.          Objective   Vital Signs:  /70 (BP Location: Left arm, Patient Position: Sitting, Cuff Size: Adult)   Pulse 79   Temp 97.9 °F (36.6 °C) (Temporal)   Ht 172.7 cm (67.99\")   Wt 56.2 kg (124 lb)   SpO2 100%   BMI 18.86 kg/m²   Physical Exam  Vitals and nursing note reviewed.   Constitutional:       General: She is not in acute distress.     Appearance: Normal appearance. She is normal weight. She is not ill-appearing, toxic-appearing or diaphoretic.   HENT:      Head: Normocephalic and atraumatic.      Right Ear: Tympanic membrane, ear canal and external ear normal. There is no impacted cerumen.      Left Ear: Tympanic membrane, ear canal and external ear normal. There is no impacted cerumen.      Nose: Nose normal.   Eyes:      Extraocular Movements: Extraocular movements intact.      Conjunctiva/sclera: Conjunctivae normal.      Pupils: Pupils are equal, round, and reactive to light.   Neck:      Thyroid: No thyroid mass, thyromegaly or thyroid tenderness.      Vascular: No carotid bruit.   Cardiovascular:      Rate and Rhythm: Normal rate and regular rhythm.      Pulses: Normal pulses.      Heart sounds: Normal heart sounds.   Pulmonary:      Effort: Pulmonary effort is normal.      Breath sounds: Normal breath sounds.   Abdominal:      General: Bowel sounds are normal.      Palpations: Abdomen is soft.   Musculoskeletal:         General: Normal range of motion.      Cervical back: Normal range of motion and neck supple.      Right lower leg: No edema.      Left lower leg: No edema.   Skin:     General: Skin is warm and dry.   Neurological:      " General: No focal deficit present.      Mental Status: She is alert and oriented to person, place, and time. Mental status is at baseline.   Psychiatric:         Mood and Affect: Mood normal.         Behavior: Behavior normal.         Thought Content: Thought content normal.         Judgment: Judgment normal.           Fluid noted in the ear.  Thyroid is normal.  Lungs were auscultated.    Vital Signs  Weight is 124. Blood pressure is 152/70 and on recheck 155/80. Heart rate is 102.        Results  Laboratory Studies  HDL was 119 in August 2024, previously 146. LDL was 49.              Assessment and Plan        1. Hypertension.  Her blood pressure readings today were slightly elevated at 152/70 and 155/80, compared to a previous reading of 110/68 during her last visit with Dr. Payton. She reports no chest pain or vision changes. She is advised to monitor her blood pressure daily for the next week, taking readings at various times of the day, including before and after work. This will help determine if the elevated readings are consistent or if they vary throughout the day. If her blood pressure remains elevated, medication adjustments may be considered.    2. Anxiety.  She reports feeling anxious and has a history of high anxiety. She currently takes Xanax 0.5 mg daily in the morning. A trial of BuSpar 10 mg/nightly (buspirone) 7 tablets has been prescribed to be taken at night to assess its impact on her sleep quality. She is advised to provide feedback on the effectiveness of BuSpar in improving her sleep quality and managing her anxiety. If BuSpar is effective, it may be considered as a long-term addition to her treatment regimen.  Concerning Xanax, UDS updated today, PDMP appears appropriate, up-to-date on CSA.    3. Chronic allergies.  She reports that her current allergy medication, loratadine, is not as effective as it used to be. She is advised to switch to Xyzal (levocetirizine) to be taken nightly due to  its potential to cause drowsiness. This change aims to improve her allergy symptoms while minimizing daytime drowsiness.    4. Underweight status - Improving.  She has experienced a viral illness recently, which may have contributed to her weight loss. However, she reports feeling better now and has a good appetite. Her weight has increased from 118 lbs in December to 124 lbs today. She is advised to continue monitoring her weight and ensure adequate nutritional intake.    5. Osteoporosis.  She is currently on Fosamax, vitamin D, and calcium replacement for osteoporosis. A bone density scan has been ordered for December 2025 to monitor her bone health.    6. Hyperlipidemia.  Her HDL levels have been unusually high, but recent labs show an improvement from 146 to 119. She is on rosuvastatin 5 mg daily and takes flaxseed to help manage her cholesterol levels. Comprehensive labs, including a cholesterol panel, have been ordered to monitor her lipid levels.    7. Chronic obstructive pulmonary disease (COPD).  She follows with pulmonology for COPD and uses albuterol as needed and Wixela for maintenance. No changes to her current treatment plan are necessary at this time.    8. Right bundle branch block, left anterior fascicular block, and first-degree AV block.  She follows with cardiology for these conditions and takes daily 81 mg aspirin. No changes to her current treatment plan are necessary at this time.    9. Health maintenance.  She is up to date on her mammogram and colonoscopy screenings. A mammogram has been ordered for December 2025. Comprehensive labs, including cholesterol, vitamin D, calcium, kidney function, and liver enzymes, have been ordered.    10. Medication management.  Her PDMP has been reviewed and appears appropriate. The last fill date for Xanax was February 7, 2025. She is advised to continue her current medications, including lisinopril 20 mg once daily, rosuvastatin 5 mg daily, and Xanax 0.5 mg  daily.    PROCEDURE  Colonoscopy for colorectal cancer screening was completed in December 2024 and was unremarkable other than a polyp being removed.            Follow Up   No follow-ups on file.  Patient was given instructions and counseling regarding her condition or for health maintenance advice. Please see specific information pulled into the AVS if appropriate.  Patient or patient representative verbalized consent for the use of Ambient Listening during the visit with  LUAN Lemus for chart documentation. 3/4/2025  14:52 CST

## 2025-03-05 LAB
25(OH)D3+25(OH)D2 SERPL-MCNC: 78.1 NG/ML (ref 30–100)
ALBUMIN SERPL-MCNC: 4.2 G/DL (ref 3.5–5.2)
ALBUMIN/GLOB SERPL: 2 G/DL
ALP SERPL-CCNC: 79 U/L (ref 39–117)
ALT SERPL-CCNC: 23 U/L (ref 1–33)
APPEARANCE UR: CLEAR
AST SERPL-CCNC: 27 U/L (ref 1–32)
BACTERIA #/AREA URNS HPF: NORMAL /HPF
BASOPHILS # BLD AUTO: 0.05 10*3/MM3 (ref 0–0.2)
BASOPHILS NFR BLD AUTO: 0.7 % (ref 0–1.5)
BILIRUB SERPL-MCNC: 0.4 MG/DL (ref 0–1.2)
BILIRUB UR QL STRIP: NEGATIVE
BUN SERPL-MCNC: 22 MG/DL (ref 8–23)
BUN/CREAT SERPL: 26.2 (ref 7–25)
CALCIUM SERPL-MCNC: 9.2 MG/DL (ref 8.6–10.5)
CASTS URNS MICRO: NORMAL
CHLORIDE SERPL-SCNC: 98 MMOL/L (ref 98–107)
CHOLEST SERPL-MCNC: 179 MG/DL (ref 0–200)
CO2 SERPL-SCNC: 25 MMOL/L (ref 22–29)
COLOR UR: YELLOW
CREAT SERPL-MCNC: 0.84 MG/DL (ref 0.57–1)
EGFRCR SERPLBLD CKD-EPI 2021: 74.9 ML/MIN/1.73
EOSINOPHIL # BLD AUTO: 0.3 10*3/MM3 (ref 0–0.4)
EOSINOPHIL NFR BLD AUTO: 4.2 % (ref 0.3–6.2)
EPI CELLS #/AREA URNS HPF: NORMAL /HPF
ERYTHROCYTE [DISTWIDTH] IN BLOOD BY AUTOMATED COUNT: 11.7 % (ref 12.3–15.4)
GLOBULIN SER CALC-MCNC: 2.1 GM/DL
GLUCOSE SERPL-MCNC: 70 MG/DL (ref 65–99)
GLUCOSE UR QL STRIP: NEGATIVE
HCT VFR BLD AUTO: 38.8 % (ref 34–46.6)
HDLC SERPL-MCNC: 118 MG/DL (ref 40–60)
HGB BLD-MCNC: 12.8 G/DL (ref 12–15.9)
HGB UR QL STRIP: NEGATIVE
IMM GRANULOCYTES # BLD AUTO: 0.02 10*3/MM3 (ref 0–0.05)
IMM GRANULOCYTES NFR BLD AUTO: 0.3 % (ref 0–0.5)
KETONES UR QL STRIP: NEGATIVE
LDLC SERPL CALC-MCNC: 53 MG/DL (ref 0–100)
LEUKOCYTE ESTERASE UR QL STRIP: ABNORMAL
LYMPHOCYTES # BLD AUTO: 2.67 10*3/MM3 (ref 0.7–3.1)
LYMPHOCYTES NFR BLD AUTO: 37.1 % (ref 19.6–45.3)
MCH RBC QN AUTO: 31.4 PG (ref 26.6–33)
MCHC RBC AUTO-ENTMCNC: 33 G/DL (ref 31.5–35.7)
MCV RBC AUTO: 95.1 FL (ref 79–97)
MONOCYTES # BLD AUTO: 0.73 10*3/MM3 (ref 0.1–0.9)
MONOCYTES NFR BLD AUTO: 10.2 % (ref 5–12)
NEUTROPHILS # BLD AUTO: 3.42 10*3/MM3 (ref 1.7–7)
NEUTROPHILS NFR BLD AUTO: 47.5 % (ref 42.7–76)
NITRITE UR QL STRIP: NEGATIVE
NRBC BLD AUTO-RTO: 0 /100 WBC (ref 0–0.2)
PH UR STRIP: 7 [PH] (ref 5–8)
PLATELET # BLD AUTO: 351 10*3/MM3 (ref 140–450)
POTASSIUM SERPL-SCNC: 5.1 MMOL/L (ref 3.5–5.2)
PROT SERPL-MCNC: 6.3 G/DL (ref 6–8.5)
PROT UR QL STRIP: NEGATIVE
RBC # BLD AUTO: 4.08 10*6/MM3 (ref 3.77–5.28)
RBC #/AREA URNS HPF: NORMAL /HPF
SODIUM SERPL-SCNC: 137 MMOL/L (ref 136–145)
SP GR UR STRIP: 1.01 (ref 1–1.03)
TRIGL SERPL-MCNC: 34 MG/DL (ref 0–150)
TSH SERPL DL<=0.005 MIU/L-ACNC: 0.62 UIU/ML (ref 0.27–4.2)
UROBILINOGEN UR STRIP-MCNC: ABNORMAL MG/DL
VLDLC SERPL CALC-MCNC: 8 MG/DL (ref 5–40)
WBC # BLD AUTO: 7.19 10*3/MM3 (ref 3.4–10.8)
WBC #/AREA URNS HPF: NORMAL /HPF

## 2025-03-06 NOTE — PROGRESS NOTES
Urine with leukocytes/white blood cells, otherwise normal.  Cholesterol continues to be improved, continue current management  Normal fasting blood glucose of 70, stable renal function, stable electrolytes, normal liver enzymes  Normal CBC  Normal thyroid function  Vitamin D is in good range, continue current supplementation

## 2025-03-10 DIAGNOSIS — F41.1 GAD (GENERALIZED ANXIETY DISORDER): ICD-10-CM

## 2025-03-10 DIAGNOSIS — I10 ESSENTIAL HYPERTENSION: Primary | ICD-10-CM

## 2025-03-10 RX ORDER — LISINOPRIL 10 MG/1
10 TABLET ORAL DAILY
Qty: 30 TABLET | Refills: 0 | Status: SHIPPED | OUTPATIENT
Start: 2025-03-10

## 2025-03-10 RX ORDER — BUSPIRONE HYDROCHLORIDE 10 MG/1
10 TABLET ORAL NIGHTLY
Qty: 90 TABLET | Refills: 1 | Status: SHIPPED | OUTPATIENT
Start: 2025-03-10

## 2025-04-01 ENCOUNTER — OFFICE VISIT (OUTPATIENT)
Dept: INTERNAL MEDICINE | Facility: CLINIC | Age: 70
End: 2025-04-01
Payer: MEDICARE

## 2025-04-01 VITALS
TEMPERATURE: 97.3 F | BODY MASS INDEX: 19.19 KG/M2 | DIASTOLIC BLOOD PRESSURE: 78 MMHG | HEART RATE: 78 BPM | HEIGHT: 68 IN | WEIGHT: 126.6 LBS | OXYGEN SATURATION: 99 % | SYSTOLIC BLOOD PRESSURE: 156 MMHG

## 2025-04-01 DIAGNOSIS — F41.9 ANXIETY: ICD-10-CM

## 2025-04-01 DIAGNOSIS — G47.9 SLEEP DISTURBANCE: ICD-10-CM

## 2025-04-01 DIAGNOSIS — F41.1 GAD (GENERALIZED ANXIETY DISORDER): Primary | ICD-10-CM

## 2025-04-01 DIAGNOSIS — I10 ESSENTIAL HYPERTENSION: ICD-10-CM

## 2025-04-01 RX ORDER — LISINOPRIL 10 MG/1
10 TABLET ORAL DAILY
Qty: 30 TABLET | Refills: 0 | Status: SHIPPED | OUTPATIENT
Start: 2025-04-01

## 2025-04-01 RX ORDER — ALPRAZOLAM 0.5 MG/1
0.5 TABLET, EXTENDED RELEASE ORAL EVERY MORNING
Qty: 30 TABLET | Refills: 5 | Status: SHIPPED | OUTPATIENT
Start: 2025-04-04

## 2025-04-01 RX ORDER — BUSPIRONE HYDROCHLORIDE 10 MG/1
10 TABLET ORAL 3 TIMES DAILY
Qty: 30 TABLET | Refills: 0 | Status: SHIPPED | OUTPATIENT
Start: 2025-04-01

## 2025-04-01 NOTE — PROGRESS NOTES
Subjective   Michelle Wilson is a 69 y.o. female.   Chief Complaint   Patient presents with    Discuss Medication     Patient would like to discuss Xanax -   Patient states she has noticed her body has had more anxiety occurring. States she would like to discuss an increase or an alternative.        History of Present Illness   History of Present Illness  The patient is a 69-year-old female who presents to the office today to additionally discuss her anxiety. She was seen in the recent past where she had expressed evidence of poor quality sleep, which she believes is related to anxiety at night, with less than 2 hours of REM sleep nightly per her sleep tracker. She already takes Xanax 0.5 mg every day in the morning and has done so for numerous years. Rather than increase her Xanax, it was discussed initiating BuSpar 10 mg nightly to see if this will help improve sleep quality if in fact poor sleep quality was related to anxiety and anxious thoughts during sleep and keeping her from sleeping.       She reports that her anxiety remains uncontrolled. She has been experiencing these symptoms for approximately 2 months prior to her last appointment. She has consulted a therapist regarding her anxiety, but found the sessions unhelpful. She was advised to maintain physical activity, which she does not find bothersome. However, she experiences body tension when at rest, such as watching TV, eating, or standing at work. She also reports fatigue due to her anxiety. She has been taking BuSpar 10 mg at night, which she reports as beneficial in improving her sleep quality and morning restfulness. She describes the medication as having a calming effect, although it takes some time to manifest. She is considering daytime use of BuSpar but is concerned about potential drowsiness. She has been on Xanax 0.5 mg daily in the morning for several years, which she finds helpful in managing her mood and preventing irritability. She has  never exceeded the prescribed dose of Xanax. She is currently taking magnesium 500 mg at night.    She has been monitoring her blood pressure, which has been inconsistent, with readings of 145 and later 134 yesterday. She reports no chest pain.    Over the past 2 weeks, she has experienced diarrhea, necessitating 2 bathroom visits during work hours. She has been supplementing her diet with protein shakes during her 50-minute break and eating upon returning home.    MEDICATIONS  Current: Xanax, BuSpar, lisinopril, magnesium       The following portions of the patient's history were reviewed and updated as appropriate: allergies, current medications, past family history, past medical history, past social history, past surgical history and problem list.    Review of Systems    Objective   Past Medical History:   Diagnosis Date    Anxiety     Arthritis     Asthma     Atherosclerosis neck vessels  04/13/2022    Bursitis and tendinitis of shoulder region     Left;  w/ small, partial tear to tendon.    Cancer     skin    COPD (chronic obstructive pulmonary disease)     Depression     HYATT (dyspnea on exertion)     Elevated cholesterol     Family history of early CAD     Full dentures     Hypertension     LAFB (left anterior fascicular block)     RBBB       Past Surgical History:   Procedure Laterality Date    APPENDECTOMY      BREAST BIOPSY Right     CHOLECYSTECTOMY  1978    COLONOSCOPY  11/13/2019    Dr. Colon-one 12 mm   sessile serrated adenoma polyp found to be benigh in the ascending colon, 6  polyps in the rectum ranging from 2 to 4 mm in size-4 were removed that were hyperplastic    COLONOSCOPY N/A 12/3/2024    Procedure: COLONOSCOPY WITH ANESTHESIA;  Surgeon: Christopher Alanis DO;  Location: [facility] ENDOSCOPY;  Service: Gastroenterology;  Laterality: N/A;  pre: hx polyps  post: diverticulosis  Janae    EYE SURGERY      SKIN CANCER EXCISION  2019    TONSILLECTOMY      TUBAL ABDOMINAL LIGATION Bilateral          Current Outpatient Medications:     albuterol sulfate  (90 Base) MCG/ACT inhaler, Inhale 2 puffs Every 4 (Four) Hours As Needed for Wheezing or Shortness of Air., Disp: 6.7 g, Rfl: 0    alendronate (FOSAMAX) 70 MG tablet, Take 1 tablet by mouth Every 7 (Seven) Days., Disp: 12 tablet, Rfl: 3    [START ON 4/4/2025] ALPRAZolam XR (XANAX XR) 0.5 MG 24 hr tablet, Take 1 tablet by mouth Every Morning., Disp: 30 tablet, Rfl: 5    aspirin 81 MG EC tablet, Take 1 tablet by mouth Daily., Disp: , Rfl:     Azelastine HCl 137 MCG/SPRAY solution, 2 sprays into the nostril(s) as directed by provider 2 (Two) Times a Day., Disp: 30 mL, Rfl: 11    busPIRone (BUSPAR) 10 MG tablet, Take 1 tablet by mouth 3 (Three) Times a Day., Disp: 30 tablet, Rfl: 0    Calcium Carbonate (CALCIUM 600 PO), Take 1 tablet by mouth Daily., Disp: , Rfl:     etodolac (LODINE) 400 MG tablet, TAKE 1 TABLET BY MOUTH 2 (TWO) TIMES A DAY., Disp: 180 tablet, Rfl: 1    Flaxseed, Linseed, (FLAX SEED OIL PO), Take  by mouth., Disp: , Rfl:     fluticasone (Flonase Allergy Relief) 50 MCG/ACT nasal spray, 2 sprays into the nostril(s) as directed by provider Daily., Disp: 16 g, Rfl: 11    Fluticasone-Salmeterol (Wixela Inhub) 250-50 MCG/ACT DISKUS, Inhale 1 puff 2 (Two) Times a Day. Rinse and spit after using., Disp: 60 each, Rfl: 11    levocetirizine (XYZAL) 5 MG tablet, Take 1 tablet by mouth Every Evening., Disp: 90 tablet, Rfl: 3    lisinopril (PRINIVIL,ZESTRIL) 10 MG tablet, Take 1 tablet by mouth Daily. Take along with lisinopril 20 mg daily for total of 30 mg daily, Disp: 30 tablet, Rfl: 0    lisinopril (PRINIVIL,ZESTRIL) 20 MG tablet, TAKE 1 TABLET BY MOUTH ONCE DAILY, Disp: 90 tablet, Rfl: 3    Multiple Vitamins-Minerals (MULTIVITAMIN ADULT PO), Take 1 tablet by mouth Daily., Disp: , Rfl:     rosuvastatin (CRESTOR) 5 MG tablet, Take 1 tablet by mouth Daily., Disp: 90 tablet, Rfl: 3    vitamin C (ASCORBIC ACID) 500 MG tablet, Take 1 tablet by mouth  "Daily., Disp: , Rfl:     VITAMIN E PO, Take 1 capsule by mouth Daily., Disp: , Rfl:     Zinc 50 MG tablet, Take 1 tablet by mouth Daily., Disp: , Rfl:       /78 (BP Location: Left arm, Patient Position: Sitting, Cuff Size: Adult)   Pulse 78   Temp 97.3 °F (36.3 °C) (Temporal)   Ht 172.7 cm (67.99\")   Wt 57.4 kg (126 lb 9.6 oz)   LMP  (LMP Unknown)   SpO2 99%   BMI 19.26 kg/m²      Body mass index is 19.26 kg/m².  BMI is within normal parameters. No other follow-up for BMI required.       Physical Exam  Vitals and nursing note reviewed.   Constitutional:       General: She is not in acute distress.     Appearance: Normal appearance. She is normal weight. She is not ill-appearing, toxic-appearing or diaphoretic.   HENT:      Head: Normocephalic and atraumatic.   Eyes:      Extraocular Movements: Extraocular movements intact.      Conjunctiva/sclera: Conjunctivae normal.      Pupils: Pupils are equal, round, and reactive to light.   Cardiovascular:      Rate and Rhythm: Normal rate and regular rhythm.      Pulses: Normal pulses.      Heart sounds: Normal heart sounds.   Pulmonary:      Effort: Pulmonary effort is normal.      Breath sounds: Normal breath sounds.   Musculoskeletal:         General: Normal range of motion.      Cervical back: Normal range of motion and neck supple.   Skin:     General: Skin is warm and dry.   Neurological:      General: No focal deficit present.      Mental Status: She is alert and oriented to person, place, and time. Mental status is at baseline.   Psychiatric:         Behavior: Behavior normal.         Thought Content: Thought content normal.         Judgment: Judgment normal.               Assessment & Plan   Diagnoses and all orders for this visit:    1. CA (generalized anxiety disorder) (Primary)  -     busPIRone (BUSPAR) 10 MG tablet; Take 1 tablet by mouth 3 (Three) Times a Day.  Dispense: 30 tablet; Refill: 0    2. Sleep disturbance    3. Anxiety  -     ALPRAZolam " XR (XANAX XR) 0.5 MG 24 hr tablet; Take 1 tablet by mouth Every Morning.  Dispense: 30 tablet; Refill: 5    4. Essential hypertension  -     lisinopril (PRINIVIL,ZESTRIL) 10 MG tablet; Take 1 tablet by mouth Daily. Take along with lisinopril 20 mg daily for total of 30 mg daily  Dispense: 30 tablet; Refill: 0                 Assessment & Plan  1. Anxiety.  She reports that BuSpar 10 mg nightly has not been effective in managing her anxiety throughout the day but has made some notable difference when she takes it at night but providing more relaxed sleep. She is advised to continue practicing relaxation techniques such as deep breathing exercises, journaling, and listening to calming music. A prescription for BuSpar 10 mg, to be taken three times daily, has been provided. She is instructed to trial this regimen for a week and report any adverse effects. A refill for Xanax 0.5 mg has also been issued, continue to take once daily prn.  Up-to-date on UDS, CSA, PDMP reviewed.  Return next Tuesday for reevaluation.    2. Hypertension.  We had increased lisinopril from 20 mg daily to 30 mg daily, blood pressure remains the same in office today, reports systolic readings ranging from 130s to 145 systolic with home blood pressure monitoring.  I would like to get improved control of her anxiety initially, if hypertension remains poorly managed thereafter will adjust medication for hypertension additionally.    3. Diarrhea.  She reports experiencing diarrhea over the past two weeks, which may be related to her anxiety. She is advised to maintain a balanced diet.     Follow-up  The patient is scheduled for a follow-up visit in one week, prn prior.     Patient or patient representative verbalized consent for the use of Ambient Listening during the visit with  LUAN Lemus for chart documentation. 4/1/2025  09:06 CDT    Please note that portions of this note were completed with a voice recognition program.      Electronically signed by LUAN Lemus, 04/01/25, 09:08 CDT.

## 2025-04-08 ENCOUNTER — OFFICE VISIT (OUTPATIENT)
Dept: INTERNAL MEDICINE | Facility: CLINIC | Age: 70
End: 2025-04-08
Payer: MEDICARE

## 2025-04-08 VITALS
OXYGEN SATURATION: 97 % | HEART RATE: 85 BPM | WEIGHT: 127.6 LBS | DIASTOLIC BLOOD PRESSURE: 76 MMHG | SYSTOLIC BLOOD PRESSURE: 138 MMHG | BODY MASS INDEX: 19.34 KG/M2 | TEMPERATURE: 97.3 F | HEIGHT: 68 IN

## 2025-04-08 DIAGNOSIS — F41.1 GAD (GENERALIZED ANXIETY DISORDER): ICD-10-CM

## 2025-04-08 DIAGNOSIS — F41.0 SEVERE ANXIETY WITH PANIC: ICD-10-CM

## 2025-04-08 RX ORDER — ALPRAZOLAM 1 MG/1
1 TABLET, EXTENDED RELEASE ORAL EVERY MORNING
Qty: 30 TABLET | Refills: 4 | Status: SHIPPED | OUTPATIENT
Start: 2025-04-19

## 2025-04-08 RX ORDER — BUSPIRONE HYDROCHLORIDE 10 MG/1
10 TABLET ORAL NIGHTLY PRN
Qty: 30 TABLET | Refills: 0 | Status: SHIPPED | OUTPATIENT
Start: 2025-04-08

## 2025-04-08 NOTE — PROGRESS NOTES
"Subjective   Michelle Wilson is a 69 y.o. female.   Chief Complaint   Patient presents with    Follow-up     1 week f/u;  Patient states when she takes the Buspar this will only works for at least two hours - took this every 4 hours (5-6 daily).    States today she has taken two 0.5mg tablets of Xanax and has noticed an improvement - has tolerated a 1mg in the past.   States with the increase she doesn't feel jittery or the \"ball\" in her stomach as discussed at last visit.        History of Present Illness   History of Present Illness         The following portions of the patient's history were reviewed and updated as appropriate: allergies, current medications, past family history, past medical history, past social history, past surgical history and problem list.    Review of Systems    Objective   Past Medical History:   Diagnosis Date    Anxiety     Arthritis     Asthma     Atherosclerosis neck vessels  04/13/2022    Bursitis and tendinitis of shoulder region     Left;  w/ small, partial tear to tendon.    Cancer     skin    COPD (chronic obstructive pulmonary disease)     Depression     HYATT (dyspnea on exertion)     Elevated cholesterol     Family history of early CAD     Full dentures     Hypertension     LAFB (left anterior fascicular block)     RBBB       Past Surgical History:   Procedure Laterality Date    APPENDECTOMY      BREAST BIOPSY Right     CHOLECYSTECTOMY  1978    COLONOSCOPY  11/13/2019    Dr. Colon-one 12 mm   sessile serrated adenoma polyp found to be benigh in the ascending colon, 6  polyps in the rectum ranging from 2 to 4 mm in size-4 were removed that were hyperplastic    COLONOSCOPY N/A 12/3/2024    Procedure: COLONOSCOPY WITH ANESTHESIA;  Surgeon: Christopher Alanis DO;  Location: [facility] ENDOSCOPY;  Service: Gastroenterology;  Laterality: N/A;  pre: hx polyps  post: diverticulosis  Janae    EYE SURGERY      SKIN CANCER EXCISION  2019    TONSILLECTOMY      TUBAL ABDOMINAL LIGATION " Bilateral         Current Outpatient Medications:     albuterol sulfate  (90 Base) MCG/ACT inhaler, Inhale 2 puffs Every 4 (Four) Hours As Needed for Wheezing or Shortness of Air., Disp: 6.7 g, Rfl: 0    alendronate (FOSAMAX) 70 MG tablet, Take 1 tablet by mouth Every 7 (Seven) Days., Disp: 12 tablet, Rfl: 3    [START ON 4/19/2025] ALPRAZolam XR (XANAX XR) 1 MG 24 hr tablet, Take 1 tablet by mouth Every Morning., Disp: 30 tablet, Rfl: 4    aspirin 81 MG EC tablet, Take 1 tablet by mouth Daily., Disp: , Rfl:     Azelastine HCl 137 MCG/SPRAY solution, 2 sprays into the nostril(s) as directed by provider 2 (Two) Times a Day., Disp: 30 mL, Rfl: 11    busPIRone (BUSPAR) 10 MG tablet, Take 1 tablet by mouth At Night As Needed (for anxiety that interferes with sleep)., Disp: 30 tablet, Rfl: 0    Calcium Carbonate (CALCIUM 600 PO), Take 1 tablet by mouth Daily., Disp: , Rfl:     etodolac (LODINE) 400 MG tablet, TAKE 1 TABLET BY MOUTH 2 (TWO) TIMES A DAY., Disp: 180 tablet, Rfl: 1    Flaxseed, Linseed, (FLAX SEED OIL PO), Take  by mouth., Disp: , Rfl:     fluticasone (Flonase Allergy Relief) 50 MCG/ACT nasal spray, 2 sprays into the nostril(s) as directed by provider Daily., Disp: 16 g, Rfl: 11    Fluticasone-Salmeterol (Wixela Inhub) 250-50 MCG/ACT DISKUS, Inhale 1 puff 2 (Two) Times a Day. Rinse and spit after using., Disp: 60 each, Rfl: 11    levocetirizine (XYZAL) 5 MG tablet, Take 1 tablet by mouth Every Evening., Disp: 90 tablet, Rfl: 3    lisinopril (PRINIVIL,ZESTRIL) 10 MG tablet, Take 1 tablet by mouth Daily. Take along with lisinopril 20 mg daily for total of 30 mg daily, Disp: 30 tablet, Rfl: 0    lisinopril (PRINIVIL,ZESTRIL) 20 MG tablet, TAKE 1 TABLET BY MOUTH ONCE DAILY, Disp: 90 tablet, Rfl: 3    Multiple Vitamins-Minerals (MULTIVITAMIN ADULT PO), Take 1 tablet by mouth Daily., Disp: , Rfl:     rosuvastatin (CRESTOR) 5 MG tablet, Take 1 tablet by mouth Daily., Disp: 90 tablet, Rfl: 3    vitamin C  "(ASCORBIC ACID) 500 MG tablet, Take 1 tablet by mouth Daily., Disp: , Rfl:     VITAMIN E PO, Take 1 capsule by mouth Daily., Disp: , Rfl:     Zinc 50 MG tablet, Take 1 tablet by mouth Daily., Disp: , Rfl:       /76 (BP Location: Left arm, Patient Position: Sitting, Cuff Size: Adult)   Pulse 85   Temp 97.3 °F (36.3 °C) (Temporal)   Ht 172.7 cm (67.99\")   Wt 57.9 kg (127 lb 9.6 oz)   LMP  (LMP Unknown)   SpO2 97%   BMI 19.41 kg/m²      Body mass index is 19.41 kg/m².  BMI is within normal parameters. No other follow-up for BMI required.       Physical Exam  Vitals and nursing note reviewed.   Constitutional:       General: She is not in acute distress.     Appearance: Normal appearance. She is normal weight. She is not ill-appearing, toxic-appearing or diaphoretic.   HENT:      Head: Normocephalic and atraumatic.   Eyes:      Extraocular Movements: Extraocular movements intact.      Conjunctiva/sclera: Conjunctivae normal.      Pupils: Pupils are equal, round, and reactive to light.   Cardiovascular:      Rate and Rhythm: Normal rate and regular rhythm.      Pulses: Normal pulses.      Heart sounds: Normal heart sounds.   Pulmonary:      Effort: Pulmonary effort is normal.      Breath sounds: Normal breath sounds.   Musculoskeletal:      Cervical back: Normal range of motion and neck supple.   Skin:     General: Skin is warm and dry.   Neurological:      General: No focal deficit present.      Mental Status: She is alert and oriented to person, place, and time. Mental status is at baseline.   Psychiatric:         Mood and Affect: Mood normal.         Behavior: Behavior normal.         Thought Content: Thought content normal.         Judgment: Judgment normal.               Assessment & Plan   Diagnoses and all orders for this visit:    1. CA (generalized anxiety disorder)  -     busPIRone (BUSPAR) 10 MG tablet; Take 1 tablet by mouth At Night As Needed (for anxiety that interferes with sleep).  Dispense: " 30 tablet; Refill: 0    2. Severe anxiety with panic  -     ALPRAZolam XR (XANAX XR) 1 MG 24 hr tablet; Take 1 tablet by mouth Every Morning.  Dispense: 30 tablet; Refill: 4                 Assessment & Plan      Patient or patient representative verbalized consent for the use of Ambient Listening during the visit with  LUAN Lemus for chart documentation. 4/8/2025  11:35 CDT    Please note that portions of this note were completed with a voice recognition program.     Electronically signed by LUAN Lemus, 04/08/25, 11:58 CDT.

## 2025-04-08 NOTE — LETTER
April 8, 2025     Patient: Michelle Wilson   YOB: 1955   Date of Visit: 4/8/2025       To Whom It May Concern:    It is my medical opinion that Michelle Wilson was at a office visit today.            Sincerely,        LUAN Lemus    CC: No Recipients

## 2025-04-08 NOTE — PROGRESS NOTES
"Subjective   Michelle L St Mcdonnell is a 69 y.o. female.   Chief Complaint   Patient presents with    Follow-up     1 week f/u;  Patient states when she takes the Buspar this will only works for at least two hours - took this every 4 hours (5-6 daily).    States today she has taken two 0.5mg tablets of Xanax and has noticed an improvement - has tolerated a 1mg in the past.   States with the increase she doesn't feel jittery or the \"ball\" in her stomach as discussed at last visit.        History of Present Illness   History of Present Illness  Ms. Ashton is a 69-year-old female here today for a 1 week follow-up and management of CA/severe anxiety with panic.    As discussed with her in the office today, she has a known lifelong history of generalized anxiety disorder with intermittent acute flare-ups without a specific reason. There has not been any identifiable trigger to her anxiety. She is currently up to date on her urine drug screen and controlled substance agreement. PDMP was reviewed prior to updating her prescription. She is aware to call if she has any additional questions or concerns in this regard. She was previously on Xanax 1 mg extended release daily but titrated herself down to 0.5 mg daily after [facility]PC. Despite recent maxing out the dosage of BuSpar to 60 mg daily, there has been no significant improvement in her condition. After further discussion today, we have proceeded with increasing the previous prescription of Xanax from 0.5 mg daily to 1 mg daily, with an agreement to readdress this at her next appointment in September 2025 and possibly entertain the idea of then reducing it back down to 0.5 mg at that time. She is understanding about the potential risks of being on benzodiazepine therapy, including risk for physical and psychological dependence and serious life-threatening withdrawals.    MEDICATIONS  Current: BuSpar, Xanax       The following portions of the patient's history were reviewed " and updated as appropriate: allergies, current medications, past family history, past medical history, past social history, past surgical history and problem list.    Review of Systems    Objective   Past Medical History:   Diagnosis Date    Anxiety     Arthritis     Asthma     Atherosclerosis neck vessels  04/13/2022    Bursitis and tendinitis of shoulder region     Left;  w/ small, partial tear to tendon.    Cancer     skin    COPD (chronic obstructive pulmonary disease)     Depression     HYATT (dyspnea on exertion)     Elevated cholesterol     Family history of early CAD     Full dentures     Hypertension     LAFB (left anterior fascicular block)     RBBB       Past Surgical History:   Procedure Laterality Date    APPENDECTOMY      BREAST BIOPSY Right     CHOLECYSTECTOMY  1978    COLONOSCOPY  11/13/2019    Dr. Colon-one 12 mm   sessile serrated adenoma polyp found to be benigh in the ascending colon, 6  polyps in the rectum ranging from 2 to 4 mm in size-4 were removed that were hyperplastic    COLONOSCOPY N/A 12/3/2024    Procedure: COLONOSCOPY WITH ANESTHESIA;  Surgeon: Christopher Alanis DO;  Location: [facility] END[facility]COPY;  Service: Gastroenterology;  Laterality: N/A;  pre: hx polyps  post: diverticulosis  Janae    EYE SURGERY      SKIN CANCER EXCISION  2019    TONSILLECTOMY      TUBAL ABDOMINAL LIGATION Bilateral         Current Outpatient Medications:     albuterol sulfate  (90 Base) MCG/ACT inhaler, Inhale 2 puffs Every 4 (Four) Hours As Needed for Wheezing or Shortness of Air., Disp: 6.7 g, Rfl: 0    alendronate (FOSAMAX) 70 MG tablet, Take 1 tablet by mouth Every 7 (Seven) Days., Disp: 12 tablet, Rfl: 3    [START ON 4/19/2025] ALPRAZolam XR (XANAX XR) 1 MG 24 hr tablet, Take 1 tablet by mouth Every Morning., Disp: 30 tablet, Rfl: 4    aspirin 81 MG EC tablet, Take 1 tablet by mouth Daily., Disp: , Rfl:     Azelastine HCl 137 MCG/SPRAY solution, 2 sprays into the nostril(s) as directed by provider  "2 (Two) Times a Day., Disp: 30 mL, Rfl: 11    busPIRone (BUSPAR) 10 MG tablet, Take 1 tablet by mouth At Night As Needed (for anxiety that interferes with sleep)., Disp: 30 tablet, Rfl: 0    Calcium Carbonate (CALCIUM 600 PO), Take 1 tablet by mouth Daily., Disp: , Rfl:     etodolac (LODINE) 400 MG tablet, TAKE 1 TABLET BY MOUTH 2 (TWO) TIMES A DAY., Disp: 180 tablet, Rfl: 1    Flaxseed, Linseed, (FLAX SEED OIL PO), Take  by mouth., Disp: , Rfl:     fluticasone (Flonase Allergy Relief) 50 MCG/ACT nasal spray, 2 sprays into the nostril(s) as directed by provider Daily., Disp: 16 g, Rfl: 11    Fluticasone-Salmeterol (Wixela Inhub) 250-50 MCG/ACT DISKUS, Inhale 1 puff 2 (Two) Times a Day. Rinse and spit after using., Disp: 60 each, Rfl: 11    levocetirizine (XYZAL) 5 MG tablet, Take 1 tablet by mouth Every Evening., Disp: 90 tablet, Rfl: 3    lisinopril (PRINIVIL,ZESTRIL) 10 MG tablet, Take 1 tablet by mouth Daily. Take along with lisinopril 20 mg daily for total of 30 mg daily, Disp: 30 tablet, Rfl: 0    lisinopril (PRINIVIL,ZESTRIL) 20 MG tablet, TAKE 1 TABLET BY MOUTH ONCE DAILY, Disp: 90 tablet, Rfl: 3    Multiple Vitamins-Minerals (MULTIVITAMIN ADULT PO), Take 1 tablet by mouth Daily., Disp: , Rfl:     rosuvastatin (CRESTOR) 5 MG tablet, Take 1 tablet by mouth Daily., Disp: 90 tablet, Rfl: 3    vitamin C (ASCORBIC ACID) 500 MG tablet, Take 1 tablet by mouth Daily., Disp: , Rfl:     VITAMIN E PO, Take 1 capsule by mouth Daily., Disp: , Rfl:     Zinc 50 MG tablet, Take 1 tablet by mouth Daily., Disp: , Rfl:       /76 (BP Location: Left arm, Patient Position: Sitting, Cuff Size: Adult)   Pulse 85   Temp 97.3 °F (36.3 °C) (Temporal)   Ht 172.7 cm (67.99\")   Wt 57.9 kg (127 lb 9.6 oz)   LMP  (LMP Unknown)   SpO2 97%   BMI 19.41 kg/m²      Body mass index is 19.41 kg/m².  BMI is within normal parameters. No other follow-up for BMI required.       Physical Exam  Vitals and nursing note reviewed. "   Constitutional:       General: She is not in acute distress.     Appearance: Normal appearance. She is normal weight. She is not ill-appearing, toxic-appearing or diaphoretic.   HENT:      Head: Normocephalic and atraumatic.   Eyes:      Extraocular Movements: Extraocular movements intact.      Conjunctiva/sclera: Conjunctivae normal.      Pupils: Pupils are equal, round, and reactive to light.   Cardiovascular:      Rate and Rhythm: Normal rate and regular rhythm.      Pulses: Normal pulses.      Heart sounds: Normal heart sounds.   Pulmonary:      Effort: Pulmonary effort is normal.      Breath sounds: Normal breath sounds.   Musculoskeletal:      Cervical back: Normal range of motion and neck supple.   Skin:     General: Skin is warm and dry.   Neurological:      General: No focal deficit present.      Mental Status: She is alert and oriented to person, place, and time. Mental status is at baseline.   Psychiatric:         Mood and Affect: Mood normal.         Behavior: Behavior normal.         Thought Content: Thought content normal.         Judgment: Judgment normal.               Assessment & Plan   Diagnoses and all orders for this visit:    1. CA (generalized anxiety disorder)  -     busPIRone (BUSPAR) 10 MG tablet; Take 1 tablet by mouth At Night As Needed (for anxiety that interferes with sleep).  Dispense: 30 tablet; Refill: 0    2. Severe anxiety with panic  -     ALPRAZolam XR (XANAX XR) 1 MG 24 hr tablet; Take 1 tablet by mouth Every Morning.  Dispense: 30 tablet; Refill: 4                 Assessment & Plan  1. Generalized anxiety disorder.  She has a known lifelong history of generalized anxiety disorder with intermittent acute flare-ups without a specific reason. There has not been any identifiable trigger to her anxiety. She is currently up to date on her urine drug screen and controlled substance agreement. PDMP was reviewed prior to updating her prescription. She is aware to call if she has any  additional questions or concerns in this regard. Despite maxing out the dosage of BuSpar to 60 mg daily, there has been no significant improvement in her condition. She was previously on Xanax 1 mg extended release daily but titrated herself down to 0.5 mg daily after longterm. After further discussion today, we have proceeded with increasing the previous prescription of Xanax from 0.5 mg daily to 1 mg daily, with an agreement to readdress this at her next appointment in September 2025 and possibly entertain the idea of then reducing it back down to 0.5 mg at that time. She is understanding about the potential risks of being on benzodiazepine therapy, including risk for physical and psychological dependence and serious life-threatening withdrawals.    Return for next scheduled visit on 9/5/2025, as needed visits before.    Patient or patient representative verbalized consent for the use of Ambient Listening during the visit with  LUAN Lemus for chart documentation. 4/8/2025  11:35 CDT    Please note that portions of this note were completed with a voice recognition program.     Electronically signed by LUAN Lemus, 04/08/25, 12:53 CDT.

## 2025-04-24 DIAGNOSIS — I10 ESSENTIAL HYPERTENSION: ICD-10-CM

## 2025-04-24 RX ORDER — LISINOPRIL 10 MG/1
10 TABLET ORAL DAILY
Qty: 90 TABLET | Refills: 0 | Status: SHIPPED | OUTPATIENT
Start: 2025-04-24

## 2025-04-27 DIAGNOSIS — J44.9 COPD, MODERATE: ICD-10-CM

## 2025-04-28 NOTE — TELEPHONE ENCOUNTER
Rx Refill Note  Requested Prescriptions     Pending Prescriptions Disp Refills    Wixela Inhub 250-50 MCG/ACT DISKUS [Pharmacy Med Name: WIXELA 250-50 INHUB] 60 each 4     Sig: INHALE 1 PUFF TWO TIMES A DAY. RINSE AND SPIT AFTER USING.      Last office visit with prescribing clinician: 1/24/2025   Last telemedicine visit with prescribing clinician: Visit date not found   Next office visit with prescribing clinician: 5/28/2025                         Would you like a call back once the refill request has been completed: [] Yes [] No    If the office needs to give you a call back, can they leave a voicemail: [] Yes [] No    Alka Hurt MA  04/28/25, 07:45 CDT

## 2025-04-29 RX ORDER — FLUTICASONE PROPIONATE AND SALMETEROL 250; 50 UG/1; UG/1
POWDER RESPIRATORY (INHALATION)
Qty: 60 EACH | Refills: 11 | Status: SHIPPED | OUTPATIENT
Start: 2025-04-29

## 2025-05-28 ENCOUNTER — OFFICE VISIT (OUTPATIENT)
Dept: PULMONOLOGY | Facility: CLINIC | Age: 70
End: 2025-05-28
Payer: MEDICARE

## 2025-05-28 VITALS
SYSTOLIC BLOOD PRESSURE: 118 MMHG | WEIGHT: 127.8 LBS | DIASTOLIC BLOOD PRESSURE: 72 MMHG | HEIGHT: 68 IN | HEART RATE: 83 BPM | OXYGEN SATURATION: 97 % | BODY MASS INDEX: 19.37 KG/M2

## 2025-05-28 DIAGNOSIS — J43.2 CENTRILOBULAR EMPHYSEMA: Chronic | ICD-10-CM

## 2025-05-28 DIAGNOSIS — J44.9 COPD, MODERATE: Primary | Chronic | ICD-10-CM

## 2025-05-28 DIAGNOSIS — Z87.891 PERSONAL HISTORY OF NICOTINE DEPENDENCE: Chronic | ICD-10-CM

## 2025-05-28 DIAGNOSIS — R91.8 LUNG NODULES: Chronic | ICD-10-CM

## 2025-05-28 DIAGNOSIS — J44.9 COPD, MODERATE: Chronic | ICD-10-CM

## 2025-05-28 DIAGNOSIS — U09.9 POST-COVID-19 CONDITION: Chronic | ICD-10-CM

## 2025-05-28 RX ORDER — ETODOLAC 400 MG/1
400 TABLET, FILM COATED ORAL 2 TIMES DAILY
Qty: 180 TABLET | Refills: 1 | Status: SHIPPED | OUTPATIENT
Start: 2025-05-28

## 2025-05-28 NOTE — PROCEDURES
Spirometry with Diffusion Capacity & Lung Volumes    Performed by: Madison Siddiqui, RRT  Authorized by: Keshawn Martin MD     Pre Drug % Predicted    FVC: 97%   FEV1: 61%   FEF 25-75%: 23%   FEV1/FVC: 48%   T%   RV: 158%   DLCO: 75%   D/VAsb: 69%    Interpretation   Spirometry   Spirometry shows moderate obstruction.   Lung Volume Measurements  Measurements show elevated residual volume consistent with gas trapping.   Diffusion Capacity  The patient's diffusion capacity is mildly reduced.  Diffusion capacity is mildly reduced when corrected for alveolar volume.   Overall comments: The patient's spirometry is consistent with a moderate obstructive ventilatory defect.  Lung volumes do reveal hyperinflation.  There is a mild diffusion impairment particularly when corrected for alveolar volume.  When current studies are compaired to studies from 2024, there has been a minimal and not significant decline in the patient's FVC and FEV1 compared to previous.  The degree of hyperinflation has improved significantly compared to previous.  When corrected for alveolar volume there has been slight improvement in the patient's diffusion capacity compared to previous.

## 2025-05-28 NOTE — PROGRESS NOTES
Chief Complaint  Lung nodules and COPD    Subjective    History of Present Illness {  Problem List  Visit Diagnosis   Encounters  Notes  Medications  Labs  Result Review Imaging  Media: 23}    Michelle Wilson presents to [facility] for lung nodules and COPD.    History of Present Illness   The patient is stable from the standpoint of her COPD clinically and her PFTs show really no significant change in spirometry with improvement in the degree of hyperinflation and slight improvement in her diffusion capacity uncorrected for alveolar volume when compared to studies performed on April 23 of last year I reviewed the studies with her.  She is doing well on her Wixela and as needed albuterol HFA.  If she had increasing symptoms we could add anticholinergic therapy to her regimen but at present I do not think that is necessary.  I will see her back in several months.  She she has had the COVID-19 vaccine including a Spikevax booster in the form of the Moderna vaccine also received a Comirnaty Pfizer booster.  She had the flu shot this past October and is had a Prevnar 13 and Pneumovax in the past as well.   Michelle Wilson  reports that she quit smoking about 3 years ago. Her smoking use included cigarettes. She started smoking about 52 years ago. She has a 49.2 pack-year smoking history. She has never been exposed to tobacco smoke. She has never used smokeless tobacco.           Current Outpatient Medications:     albuterol sulfate  (90 Base) MCG/ACT inhaler, Inhale 2 puffs Every 4 (Four) Hours As Needed for Wheezing or Shortness of Air., Disp: 6.7 g, Rfl: 0    alendronate (FOSAMAX) 70 MG tablet, Take 1 tablet by mouth Every 7 (Seven) Days., Disp: 12 tablet, Rfl: 3    ALPRAZolam XR (XANAX XR) 1 MG 24 hr tablet, Take 1 tablet by mouth Every Morning., Disp: 30 tablet, Rfl: 4    aspirin 81 MG EC tablet, Take 1 tablet by mouth Daily., Disp: , Rfl:      Azelastine HCl 137 MCG/SPRAY solution, 2 sprays into the nostril(s) as directed by provider 2 (Two) Times a Day., Disp: 30 mL, Rfl: 11    busPIRone (BUSPAR) 10 MG tablet, Take 1 tablet by mouth At Night As Needed (for anxiety that interferes with sleep)., Disp: 30 tablet, Rfl: 0    Calcium Carbonate (CALCIUM 600 PO), Take 1 tablet by mouth Daily., Disp: , Rfl:     etodolac (LODINE) 400 MG tablet, Take 1 tablet by mouth 2 (Two) Times a Day., Disp: 180 tablet, Rfl: 1    Flaxseed, Linseed, (FLAX SEED OIL PO), Take  by mouth., Disp: , Rfl:     fluticasone (Flonase Allergy Relief) 50 MCG/ACT nasal spray, 2 sprays into the nostril(s) as directed by provider Daily., Disp: 16 g, Rfl: 11    levocetirizine (XYZAL) 5 MG tablet, Take 1 tablet by mouth Every Evening., Disp: 90 tablet, Rfl: 3    lisinopril (PRINIVIL,ZESTRIL) 10 MG tablet, Take 1 tablet by mouth Daily. Take along with lisinopril 20 mg daily for total of 30 mg daily, Disp: 90 tablet, Rfl: 0    lisinopril (PRINIVIL,ZESTRIL) 20 MG tablet, TAKE 1 TABLET BY MOUTH ONCE DAILY, Disp: 90 tablet, Rfl: 3    Multiple Vitamins-Minerals (MULTIVITAMIN ADULT PO), Take 1 tablet by mouth Daily., Disp: , Rfl:     rosuvastatin (CRESTOR) 5 MG tablet, Take 1 tablet by mouth Daily., Disp: 90 tablet, Rfl: 3    vitamin C (ASCORBIC ACID) 500 MG tablet, Take 1 tablet by mouth Daily., Disp: , Rfl:     VITAMIN E PO, Take 1 capsule by mouth Daily., Disp: , Rfl:     Wixela Inhub 250-50 MCG/ACT DISKUS, INHALE 1 PUFF TWO TIMES A DAY. RINSE AND SPIT AFTER USING., Disp: 60 each, Rfl: 11    Zinc 50 MG tablet, Take 1 tablet by mouth Daily., Disp: , Rfl:     Social History     Socioeconomic History    Marital status:    Tobacco Use    Smoking status: Former     Current packs/day: 0.00     Average packs/day: 1 pack/day for 49.2 years (49.2 ttl pk-yrs)     Types: Cigarettes     Start date: 1973     Quit date: 2/27/2022     Years since quitting: 3.2     Passive exposure: Never    Smokeless  "tobacco: Never    Tobacco comments:     Quit 2 year ago .   Vaping Use    Vaping status: Never Used   Substance and Sexual Activity    Alcohol use: Yes     Comment: occ    Drug use: Never    Sexual activity: Not Currently       Objective   Vital Signs:   /72   Pulse 83   Ht 172.7 cm (67.99\")   Wt 58 kg (127 lb 12.8 oz)   SpO2 97% Comment: RA  BMI 19.44 kg/m²     Physical Exam  Vitals and nursing note reviewed.   HENT:      Head: Normocephalic.   Eyes:      Extraocular Movements: Extraocular movements intact.      Pupils: Pupils are equal, round, and reactive to light.   Cardiovascular:      Rate and Rhythm: Normal rate and regular rhythm.   Pulmonary:      Effort: Pulmonary effort is normal.      Comments: Lung fields are clear with fair air movement bilaterally.  Musculoskeletal:         General: Normal range of motion.   Skin:     General: Skin is warm and dry.   Neurological:      General: No focal deficit present.      Mental Status: She is alert and oriented to person, place, and time.   Psychiatric:         Mood and Affect: Mood normal.         Behavior: Behavior normal.        Result Review :    PFT Values          2024    15:00 2025    14:15   Pre Drug PFT Results    97   FEV1 61 61   FEF 25-75% 21 23   FEV1/FVC 47 48   Other Tests PFT Results    123    158   DLCO 68 75   D/VAsb 63 69         Results for orders placed in visit on 25    Spirometry with Diffusion Capacity & Lung Volumes    Narrative  Spirometry with Diffusion Capacity & Lung Volumes    Performed by: Madison Siddiqui, RRT  Authorized by: Keshawn Martin MD  Pre Drug % Predicted  FVC: 97%  FEV1: 61%  FEF 25-75%: 23%  FEV1/FVC: 48%  T%  RV: 158%  DLCO: 75%  D/VAsb: 69%    Interpretation  Spirometry  Spirometry shows moderate obstruction.  Lung Volume Measurements  Measurements show elevated residual volume consistent with gas trapping.  Diffusion Capacity  The patient's " diffusion capacity is mildly reduced.  Diffusion capacity is mildly reduced when corrected for alveolar volume.  Overall comments: The patient's spirometry is consistent with a moderate obstructive ventilatory defect.  Lung volumes do reveal hyperinflation.  There is a mild diffusion impairment particularly when corrected for alveolar volume.  When current studies are compaired to studies from 2024, there has been a minimal and not significant decline in the patient's FVC and FEV1 compared to previous.  The degree of hyperinflation has improved significantly compared to previous.  When corrected for alveolar volume there has been slight improvement in the patient's diffusion capacity compared to previous.      Results for orders placed in visit on 24    Spirometry with Diffusion Capacity & Lung Volumes    Narrative  Spirometry with Diffusion Capacity & Lung Volumes    Performed by: Madison Siddiqui, RRT  Authorized by: Keshawn Martin MD  Pre Drug % Predicted  FVC: 101%  FEV1: 61%  FEF 25-75%: 21%  FEV1/FVC: 47%  T%  RV: 260%  DLCO: 68%  D/VAsb: 63%    Interpretation  Spirometry  Spirometry shows moderate obstruction.  Lung Volume Measurements  Measurements show elevated residual volume consistent with gas trapping.  Diffusion Capacity  The patient's diffusion capacity is mildly reduced.  Diffusion capacity is mildly reduced when corrected for alveolar volume.  Overall comments: The patient's spirometry is consistent with a moderate obstructive ventilatory defect.  Lung volumes reveal severe hyperinflation.  There is a mild diffusion impairment particularly when corrected for alveolar volume.  When current studies are compared to studies from May 10, 2023, there is no significant change in the patient's spirometry compared to previous.  The degree of hyperinflation has worsened compared to previous.  When corrected for alveolar volume there has been a decline in diffusion capacity.   To previous as well.      Results for orders placed in visit on 05/10/23    Full Pulmonary Function Test Without Bronchodilator    Narrative  Full Pulmonary Function Test Without Bronchodilator  Performed by: Madison Siddiqui, RRT  Authorized by: Keshawn Martin MD    Pre Drug % Predicted  FVC: 99%  FEV1: 61%  FEF 25-75%: 24%  FEV1/FVC: 48%  T%  RV: 168%  DLCO: 79%  D/VAsb: 74%    Interpretation  Spirometry  Spirometry shows moderate obstruction.  Lung Volume Measurements  Measurements show elevated residual volume consistent with gas trapping.  Diffusion Capacity  The patient's diffusion capacity is mildly reduced.  Diffusion capacity is mildly reduced when corrected for alveolar volume.  Overall comments: The patient's spirometry is consistent with a moderate obstructive ventilatory defect.  Lung volumes reveal hyperinflation.  There is a mild diffusion impairment even when corrected for alveolar volume.  When current studies are compared to studies performed at [facility] on May 3, 2022, the patient's current baseline spirometry reveals a slight drop in her FVC but some improvement in her FEV1 compared to previous prebronchodilator values.  Her current baseline spirometry reveals a decline in her FVC and a slight decline in her FEV1 compared to previous postbronchodilator values.  Her current lung volumes reveal improvement in the degree of hyperinflation compared to previous studies.                 My interpretation of imaging:    CT Chest Without Contrast Diagnostic (2025 14:08)         Assessment and Plan {CC Problem List  Visit Diagnosis  ROS  Review (Popup)  [facility] Maintenance  Quality  BestPractice  Medications  SmartSets  SnapShot Encounters  Media : 23}    Diagnoses and all orders for this visit:    1. COPD, moderate (Primary)  Assessment & Plan:  PFTs today are consistent with a moderate obstructive ventilatory defect with no significant change in  spirometry compared to studies from April 23 of last year and with less evidence of hyperinflation and slight improvement in her diffusion capacity when corrected for alveolar volume compared to previous.      2. Lung nodules  Assessment & Plan:  I will continue yearly screening CAT scans as long as she meets criteria.      3. Centrilobular emphysema  Assessment & Plan:  This is associated with her COPD.      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      5. Personal history of nicotine dependence  Assessment & Plan:  Michelle Wilson  reports that she quit smoking about 3 years ago. Her smoking use included cigarettes. She started smoking about 52 years ago. She has a 49.2 pack-year smoking history. She has never been exposed to tobacco smoke. She has never used smokeless tobacco.                     Keshawn Martin MD  5/28/2025  18:15 CDT    Follow Up   Return in about 4 months (around 9/28/2025) for To see me specifically.    Patient was given instructions and counseling regarding her condition or for health maintenance advice. Please see specific information pulled into the AVS if appropriate.

## 2025-05-28 NOTE — PATIENT INSTRUCTIONS
The patient's pulmonary functions reveal stable spirometry actually improvement in the degree of hyperinflation and and diffusion capacity when corrected for alveolar volume compared to her studies from last year and I reviewed these with her.  I will see her back in about 4 months.  She is stable on her current regimen of Wixela and it will HFA as needed but if she had worsening symptoms in the future we can add therapy with a LAMA agent.

## 2025-05-28 NOTE — ASSESSMENT & PLAN NOTE
PFTs today are consistent with a moderate obstructive ventilatory defect with no significant change in spirometry compared to studies from April 23 of last year and with less evidence of hyperinflation and slight improvement in her diffusion capacity when corrected for alveolar volume compared to previous.

## 2025-05-28 NOTE — ASSESSMENT & PLAN NOTE
Michelle Wilson  reports that she quit smoking about 3 years ago. Her smoking use included cigarettes. She started smoking about 52 years ago. She has a 49.2 pack-year smoking history. She has never been exposed to tobacco smoke. She has never used smokeless tobacco.

## 2025-08-06 DIAGNOSIS — I10 ESSENTIAL HYPERTENSION: ICD-10-CM

## 2025-08-06 RX ORDER — LISINOPRIL 10 MG/1
10 TABLET ORAL DAILY
Qty: 90 TABLET | Refills: 0 | Status: SHIPPED | OUTPATIENT
Start: 2025-08-06

## (undated) DEVICE — SENSR O2 OXIMAX FNGR A/ 18IN NONSTR

## (undated) DEVICE — THE CHANNEL CLEANING BRUSH IS A NYLON FLEXI BRUSH ATTACHED TO A FLEXIBLE PLASTIC SHEATH DESIGNED TO SAFELY REMOVE DEBRIS FROM FLEXIBLE ENDOSCOPES.

## (undated) DEVICE — YANKAUER,BULB TIP WITH VENT: Brand: ARGYLE

## (undated) DEVICE — MASK,OXYGEN,MED CONC,ADLT,7' TUB, UC: Brand: PENDING

## (undated) DEVICE — Device: Brand: DEFENDO AIR/WATER/SUCTION AND BIOPSY VALVE